# Patient Record
Sex: MALE | Race: WHITE | NOT HISPANIC OR LATINO | Employment: UNEMPLOYED | URBAN - METROPOLITAN AREA
[De-identification: names, ages, dates, MRNs, and addresses within clinical notes are randomized per-mention and may not be internally consistent; named-entity substitution may affect disease eponyms.]

---

## 2017-03-13 ENCOUNTER — COMMUNICATION - HEALTHEAST (OUTPATIENT)
Dept: FAMILY MEDICINE | Facility: CLINIC | Age: 47
End: 2017-03-13

## 2017-03-13 DIAGNOSIS — Z00.00 HEALTH CARE MAINTENANCE: ICD-10-CM

## 2017-03-13 DIAGNOSIS — I25.10 CORONARY ATHEROSCLEROSIS: ICD-10-CM

## 2017-03-14 ENCOUNTER — COMMUNICATION - HEALTHEAST (OUTPATIENT)
Dept: FAMILY MEDICINE | Facility: CLINIC | Age: 47
End: 2017-03-14

## 2017-03-14 DIAGNOSIS — Z00.00 HEALTH CARE MAINTENANCE: ICD-10-CM

## 2017-04-13 ENCOUNTER — COMMUNICATION - HEALTHEAST (OUTPATIENT)
Dept: FAMILY MEDICINE | Facility: CLINIC | Age: 47
End: 2017-04-13

## 2017-06-12 ENCOUNTER — RECORDS - HEALTHEAST (OUTPATIENT)
Dept: ADMINISTRATIVE | Facility: OTHER | Age: 47
End: 2017-06-12

## 2017-06-15 ENCOUNTER — RECORDS - HEALTHEAST (OUTPATIENT)
Dept: ADMINISTRATIVE | Facility: OTHER | Age: 47
End: 2017-06-15

## 2017-07-24 ENCOUNTER — RECORDS - HEALTHEAST (OUTPATIENT)
Dept: ADMINISTRATIVE | Facility: OTHER | Age: 47
End: 2017-07-24

## 2017-07-27 ENCOUNTER — COMMUNICATION - HEALTHEAST (OUTPATIENT)
Dept: FAMILY MEDICINE | Facility: CLINIC | Age: 47
End: 2017-07-27

## 2017-07-31 ENCOUNTER — OFFICE VISIT - HEALTHEAST (OUTPATIENT)
Dept: FAMILY MEDICINE | Facility: CLINIC | Age: 47
End: 2017-07-31

## 2017-07-31 ENCOUNTER — COMMUNICATION - HEALTHEAST (OUTPATIENT)
Dept: FAMILY MEDICINE | Facility: CLINIC | Age: 47
End: 2017-07-31

## 2017-07-31 DIAGNOSIS — I25.10 CORONARY ATHEROSCLEROSIS: ICD-10-CM

## 2017-07-31 DIAGNOSIS — Z01.818 PRE-OP EXAM: ICD-10-CM

## 2017-07-31 DIAGNOSIS — F15.11 NONDEPENDENT AMPHETAMINE OR RELATED ACTING SYMPATHOMIMETIC ABUSE, IN REMISSION (H): ICD-10-CM

## 2017-07-31 DIAGNOSIS — J45.20 ASTHMA, MILD INTERMITTENT: ICD-10-CM

## 2017-07-31 LAB
ATRIAL RATE - MUSE: 85 BPM
DIASTOLIC BLOOD PRESSURE - MUSE: NORMAL MMHG
INTERPRETATION ECG - MUSE: NORMAL
P AXIS - MUSE: 62 DEGREES
PR INTERVAL - MUSE: 158 MS
QRS DURATION - MUSE: 88 MS
QT - MUSE: 366 MS
QTC - MUSE: 435 MS
R AXIS - MUSE: 54 DEGREES
SYSTOLIC BLOOD PRESSURE - MUSE: NORMAL MMHG
T AXIS - MUSE: 55 DEGREES
VENTRICULAR RATE- MUSE: 85 BPM

## 2017-07-31 ASSESSMENT — MIFFLIN-ST. JEOR: SCORE: 1975.51

## 2017-08-01 ENCOUNTER — COMMUNICATION - HEALTHEAST (OUTPATIENT)
Dept: FAMILY MEDICINE | Facility: CLINIC | Age: 47
End: 2017-08-01

## 2017-08-11 ENCOUNTER — OFFICE VISIT - HEALTHEAST (OUTPATIENT)
Dept: ADDICTION MEDICINE | Facility: CLINIC | Age: 47
End: 2017-08-11

## 2017-08-11 DIAGNOSIS — F15.20 METHAMPHETAMINE USE DISORDER, MODERATE (H): ICD-10-CM

## 2017-08-14 ENCOUNTER — OFFICE VISIT - HEALTHEAST (OUTPATIENT)
Dept: ADDICTION MEDICINE | Facility: CLINIC | Age: 47
End: 2017-08-14

## 2017-08-14 DIAGNOSIS — F15.20 METHAMPHETAMINE USE DISORDER, MODERATE (H): ICD-10-CM

## 2017-08-18 ENCOUNTER — RECORDS - HEALTHEAST (OUTPATIENT)
Dept: ADMINISTRATIVE | Facility: OTHER | Age: 47
End: 2017-08-18

## 2017-08-18 ENCOUNTER — AMBULATORY - HEALTHEAST (OUTPATIENT)
Dept: ADDICTION MEDICINE | Facility: CLINIC | Age: 47
End: 2017-08-18

## 2017-08-21 ENCOUNTER — RECORDS - HEALTHEAST (OUTPATIENT)
Dept: ADMINISTRATIVE | Facility: OTHER | Age: 47
End: 2017-08-21

## 2017-08-23 ENCOUNTER — COMMUNICATION - HEALTHEAST (OUTPATIENT)
Dept: ADDICTION MEDICINE | Facility: CLINIC | Age: 47
End: 2017-08-23

## 2017-08-25 ENCOUNTER — AMBULATORY - HEALTHEAST (OUTPATIENT)
Dept: ADDICTION MEDICINE | Facility: CLINIC | Age: 47
End: 2017-08-25

## 2017-08-28 ENCOUNTER — AMBULATORY - HEALTHEAST (OUTPATIENT)
Dept: ADDICTION MEDICINE | Facility: CLINIC | Age: 47
End: 2017-08-28

## 2017-08-28 ENCOUNTER — OFFICE VISIT - HEALTHEAST (OUTPATIENT)
Dept: ADDICTION MEDICINE | Facility: CLINIC | Age: 47
End: 2017-08-28

## 2017-08-28 DIAGNOSIS — F15.20 METHAMPHETAMINE USE DISORDER, MODERATE (H): ICD-10-CM

## 2017-08-29 ENCOUNTER — OFFICE VISIT - HEALTHEAST (OUTPATIENT)
Dept: ADDICTION MEDICINE | Facility: CLINIC | Age: 47
End: 2017-08-29

## 2017-08-29 DIAGNOSIS — F15.20 METHAMPHETAMINE USE DISORDER, MODERATE (H): ICD-10-CM

## 2017-08-30 ENCOUNTER — RECORDS - HEALTHEAST (OUTPATIENT)
Dept: ADMINISTRATIVE | Facility: OTHER | Age: 47
End: 2017-08-30

## 2017-09-01 ENCOUNTER — AMBULATORY - HEALTHEAST (OUTPATIENT)
Dept: ADDICTION MEDICINE | Facility: CLINIC | Age: 47
End: 2017-09-01

## 2017-09-05 ENCOUNTER — OFFICE VISIT - HEALTHEAST (OUTPATIENT)
Dept: ADDICTION MEDICINE | Facility: CLINIC | Age: 47
End: 2017-09-05

## 2017-09-05 DIAGNOSIS — F15.20 METHAMPHETAMINE USE DISORDER, MODERATE (H): ICD-10-CM

## 2017-09-08 ENCOUNTER — AMBULATORY - HEALTHEAST (OUTPATIENT)
Dept: ADDICTION MEDICINE | Facility: CLINIC | Age: 47
End: 2017-09-08

## 2017-09-08 ENCOUNTER — OFFICE VISIT - HEALTHEAST (OUTPATIENT)
Dept: ADDICTION MEDICINE | Facility: CLINIC | Age: 47
End: 2017-09-08

## 2017-09-08 DIAGNOSIS — F15.20 METHAMPHETAMINE USE DISORDER, MODERATE (H): ICD-10-CM

## 2017-09-13 ENCOUNTER — OFFICE VISIT - HEALTHEAST (OUTPATIENT)
Dept: ADDICTION MEDICINE | Facility: CLINIC | Age: 47
End: 2017-09-13

## 2017-09-13 ENCOUNTER — AMBULATORY - HEALTHEAST (OUTPATIENT)
Dept: LAB | Facility: CLINIC | Age: 47
End: 2017-09-13

## 2017-09-13 DIAGNOSIS — F15.20 METHAMPHETAMINE USE DISORDER, MODERATE (H): ICD-10-CM

## 2017-09-15 ENCOUNTER — AMBULATORY - HEALTHEAST (OUTPATIENT)
Dept: ADDICTION MEDICINE | Facility: CLINIC | Age: 47
End: 2017-09-15

## 2017-09-15 ENCOUNTER — OFFICE VISIT - HEALTHEAST (OUTPATIENT)
Dept: ADDICTION MEDICINE | Facility: CLINIC | Age: 47
End: 2017-09-15

## 2017-09-15 ENCOUNTER — AMBULATORY - HEALTHEAST (OUTPATIENT)
Dept: LAB | Facility: CLINIC | Age: 47
End: 2017-09-15

## 2017-09-15 DIAGNOSIS — F15.20 METHAMPHETAMINE USE DISORDER, MODERATE (H): ICD-10-CM

## 2017-09-18 ENCOUNTER — OFFICE VISIT - HEALTHEAST (OUTPATIENT)
Dept: ADDICTION MEDICINE | Facility: CLINIC | Age: 47
End: 2017-09-18

## 2017-09-18 DIAGNOSIS — F15.20 METHAMPHETAMINE USE DISORDER, MODERATE (H): ICD-10-CM

## 2017-09-19 ENCOUNTER — OFFICE VISIT - HEALTHEAST (OUTPATIENT)
Dept: ADDICTION MEDICINE | Facility: CLINIC | Age: 47
End: 2017-09-19

## 2017-09-19 ENCOUNTER — COMMUNICATION - HEALTHEAST (OUTPATIENT)
Dept: ADDICTION MEDICINE | Facility: CLINIC | Age: 47
End: 2017-09-19

## 2017-09-19 DIAGNOSIS — F15.20 METHAMPHETAMINE USE DISORDER, MODERATE (H): ICD-10-CM

## 2017-09-20 ENCOUNTER — OFFICE VISIT - HEALTHEAST (OUTPATIENT)
Dept: ADDICTION MEDICINE | Facility: CLINIC | Age: 47
End: 2017-09-20

## 2017-09-20 DIAGNOSIS — F15.20 METHAMPHETAMINE USE DISORDER, MODERATE (H): ICD-10-CM

## 2017-09-21 ENCOUNTER — OFFICE VISIT - HEALTHEAST (OUTPATIENT)
Dept: ADDICTION MEDICINE | Facility: CLINIC | Age: 47
End: 2017-09-21

## 2017-09-21 DIAGNOSIS — F15.20 METHAMPHETAMINE USE DISORDER, MODERATE (H): ICD-10-CM

## 2017-09-22 ENCOUNTER — OFFICE VISIT - HEALTHEAST (OUTPATIENT)
Dept: ADDICTION MEDICINE | Facility: CLINIC | Age: 47
End: 2017-09-22

## 2017-09-22 ENCOUNTER — AMBULATORY - HEALTHEAST (OUTPATIENT)
Dept: ADDICTION MEDICINE | Facility: CLINIC | Age: 47
End: 2017-09-22

## 2017-09-22 DIAGNOSIS — F15.20 METHAMPHETAMINE USE DISORDER, MODERATE (H): ICD-10-CM

## 2017-09-25 ENCOUNTER — AMBULATORY - HEALTHEAST (OUTPATIENT)
Dept: ADDICTION MEDICINE | Facility: CLINIC | Age: 47
End: 2017-09-25

## 2017-09-26 ENCOUNTER — OFFICE VISIT - HEALTHEAST (OUTPATIENT)
Dept: ADDICTION MEDICINE | Facility: CLINIC | Age: 47
End: 2017-09-26

## 2017-09-26 DIAGNOSIS — F15.20 METHAMPHETAMINE USE DISORDER, MODERATE (H): ICD-10-CM

## 2017-09-27 ENCOUNTER — OFFICE VISIT - HEALTHEAST (OUTPATIENT)
Dept: ADDICTION MEDICINE | Facility: CLINIC | Age: 47
End: 2017-09-27

## 2017-09-27 DIAGNOSIS — F15.20 METHAMPHETAMINE USE DISORDER, MODERATE (H): ICD-10-CM

## 2017-09-29 ENCOUNTER — OFFICE VISIT - HEALTHEAST (OUTPATIENT)
Dept: ADDICTION MEDICINE | Facility: CLINIC | Age: 47
End: 2017-09-29

## 2017-09-29 ENCOUNTER — AMBULATORY - HEALTHEAST (OUTPATIENT)
Dept: ADDICTION MEDICINE | Facility: CLINIC | Age: 47
End: 2017-09-29

## 2017-09-29 DIAGNOSIS — F15.20 METHAMPHETAMINE USE DISORDER, MODERATE (H): ICD-10-CM

## 2017-10-06 ENCOUNTER — OFFICE VISIT - HEALTHEAST (OUTPATIENT)
Dept: ADDICTION MEDICINE | Facility: CLINIC | Age: 47
End: 2017-10-06

## 2017-10-06 ENCOUNTER — AMBULATORY - HEALTHEAST (OUTPATIENT)
Dept: ADDICTION MEDICINE | Facility: CLINIC | Age: 47
End: 2017-10-06

## 2017-10-06 DIAGNOSIS — F15.20 METHAMPHETAMINE USE DISORDER, MODERATE (H): ICD-10-CM

## 2017-10-10 ENCOUNTER — OFFICE VISIT - HEALTHEAST (OUTPATIENT)
Dept: ADDICTION MEDICINE | Facility: CLINIC | Age: 47
End: 2017-10-10

## 2017-10-10 DIAGNOSIS — F15.20 METHAMPHETAMINE USE DISORDER, MODERATE (H): ICD-10-CM

## 2017-10-13 ENCOUNTER — AMBULATORY - HEALTHEAST (OUTPATIENT)
Dept: ADDICTION MEDICINE | Facility: CLINIC | Age: 47
End: 2017-10-13

## 2017-10-16 ENCOUNTER — AMBULATORY - HEALTHEAST (OUTPATIENT)
Dept: LAB | Facility: CLINIC | Age: 47
End: 2017-10-16

## 2017-10-16 ENCOUNTER — OFFICE VISIT - HEALTHEAST (OUTPATIENT)
Dept: ADDICTION MEDICINE | Facility: CLINIC | Age: 47
End: 2017-10-16

## 2017-10-16 DIAGNOSIS — F15.20 METHAMPHETAMINE USE DISORDER, MODERATE (H): ICD-10-CM

## 2017-10-17 ENCOUNTER — COMMUNICATION - HEALTHEAST (OUTPATIENT)
Dept: ADDICTION MEDICINE | Facility: CLINIC | Age: 47
End: 2017-10-17

## 2017-10-18 ENCOUNTER — OFFICE VISIT - HEALTHEAST (OUTPATIENT)
Dept: ADDICTION MEDICINE | Facility: CLINIC | Age: 47
End: 2017-10-18

## 2017-10-18 DIAGNOSIS — F15.20 METHAMPHETAMINE USE DISORDER, MODERATE (H): ICD-10-CM

## 2017-10-20 ENCOUNTER — OFFICE VISIT - HEALTHEAST (OUTPATIENT)
Dept: ADDICTION MEDICINE | Facility: CLINIC | Age: 47
End: 2017-10-20

## 2017-10-20 ENCOUNTER — AMBULATORY - HEALTHEAST (OUTPATIENT)
Dept: ADDICTION MEDICINE | Facility: CLINIC | Age: 47
End: 2017-10-20

## 2017-10-20 DIAGNOSIS — F15.20 METHAMPHETAMINE USE DISORDER, MODERATE (H): ICD-10-CM

## 2017-10-23 ENCOUNTER — OFFICE VISIT - HEALTHEAST (OUTPATIENT)
Dept: ADDICTION MEDICINE | Facility: CLINIC | Age: 47
End: 2017-10-23

## 2017-10-23 DIAGNOSIS — F15.20 METHAMPHETAMINE USE DISORDER, MODERATE (H): ICD-10-CM

## 2017-10-24 ENCOUNTER — OFFICE VISIT - HEALTHEAST (OUTPATIENT)
Dept: ADDICTION MEDICINE | Facility: CLINIC | Age: 47
End: 2017-10-24

## 2017-10-24 DIAGNOSIS — F15.20 METHAMPHETAMINE USE DISORDER, MODERATE (H): ICD-10-CM

## 2017-10-27 ENCOUNTER — AMBULATORY - HEALTHEAST (OUTPATIENT)
Dept: LAB | Facility: CLINIC | Age: 47
End: 2017-10-27

## 2017-10-27 ENCOUNTER — OFFICE VISIT - HEALTHEAST (OUTPATIENT)
Dept: ADDICTION MEDICINE | Facility: CLINIC | Age: 47
End: 2017-10-27

## 2017-10-27 DIAGNOSIS — F15.20 METHAMPHETAMINE USE DISORDER, MODERATE (H): ICD-10-CM

## 2017-10-30 ENCOUNTER — OFFICE VISIT - HEALTHEAST (OUTPATIENT)
Dept: ADDICTION MEDICINE | Facility: CLINIC | Age: 47
End: 2017-10-30

## 2017-10-30 ENCOUNTER — AMBULATORY - HEALTHEAST (OUTPATIENT)
Dept: ADDICTION MEDICINE | Facility: CLINIC | Age: 47
End: 2017-10-30

## 2017-10-30 DIAGNOSIS — F15.20 METHAMPHETAMINE USE DISORDER, MODERATE (H): ICD-10-CM

## 2017-11-01 ENCOUNTER — OFFICE VISIT - HEALTHEAST (OUTPATIENT)
Dept: ADDICTION MEDICINE | Facility: CLINIC | Age: 47
End: 2017-11-01

## 2017-11-01 DIAGNOSIS — F15.20 METHAMPHETAMINE USE DISORDER, MODERATE (H): ICD-10-CM

## 2017-11-03 ENCOUNTER — AMBULATORY - HEALTHEAST (OUTPATIENT)
Dept: ADDICTION MEDICINE | Facility: CLINIC | Age: 47
End: 2017-11-03

## 2017-11-06 ENCOUNTER — OFFICE VISIT - HEALTHEAST (OUTPATIENT)
Dept: ADDICTION MEDICINE | Facility: CLINIC | Age: 47
End: 2017-11-06

## 2017-11-06 DIAGNOSIS — F15.20 METHAMPHETAMINE USE DISORDER, MODERATE (H): ICD-10-CM

## 2017-11-07 ENCOUNTER — OFFICE VISIT - HEALTHEAST (OUTPATIENT)
Dept: ADDICTION MEDICINE | Facility: CLINIC | Age: 47
End: 2017-11-07

## 2017-11-07 DIAGNOSIS — F15.20 METHAMPHETAMINE USE DISORDER, MODERATE (H): ICD-10-CM

## 2017-11-10 ENCOUNTER — AMBULATORY - HEALTHEAST (OUTPATIENT)
Dept: ADDICTION MEDICINE | Facility: CLINIC | Age: 47
End: 2017-11-10

## 2017-11-13 ENCOUNTER — OFFICE VISIT - HEALTHEAST (OUTPATIENT)
Dept: ADDICTION MEDICINE | Facility: CLINIC | Age: 47
End: 2017-11-13

## 2017-11-13 DIAGNOSIS — F15.20 METHAMPHETAMINE USE DISORDER, MODERATE (H): ICD-10-CM

## 2017-11-15 ENCOUNTER — OFFICE VISIT - HEALTHEAST (OUTPATIENT)
Dept: ADDICTION MEDICINE | Facility: CLINIC | Age: 47
End: 2017-11-15

## 2017-11-15 DIAGNOSIS — F15.20 METHAMPHETAMINE USE DISORDER, MODERATE (H): ICD-10-CM

## 2017-11-17 ENCOUNTER — AMBULATORY - HEALTHEAST (OUTPATIENT)
Dept: ADDICTION MEDICINE | Facility: CLINIC | Age: 47
End: 2017-11-17

## 2017-11-20 ENCOUNTER — OFFICE VISIT - HEALTHEAST (OUTPATIENT)
Dept: ADDICTION MEDICINE | Facility: CLINIC | Age: 47
End: 2017-11-20

## 2017-11-20 DIAGNOSIS — F15.20 METHAMPHETAMINE USE DISORDER, MODERATE (H): ICD-10-CM

## 2017-11-22 ENCOUNTER — AMBULATORY - HEALTHEAST (OUTPATIENT)
Dept: ADDICTION MEDICINE | Facility: CLINIC | Age: 47
End: 2017-11-22

## 2017-11-27 ENCOUNTER — OFFICE VISIT - HEALTHEAST (OUTPATIENT)
Dept: ADDICTION MEDICINE | Facility: CLINIC | Age: 47
End: 2017-11-27

## 2017-11-27 DIAGNOSIS — F15.20 METHAMPHETAMINE USE DISORDER, MODERATE (H): ICD-10-CM

## 2017-12-01 ENCOUNTER — AMBULATORY - HEALTHEAST (OUTPATIENT)
Dept: ADDICTION MEDICINE | Facility: CLINIC | Age: 47
End: 2017-12-01

## 2017-12-01 ENCOUNTER — OFFICE VISIT - HEALTHEAST (OUTPATIENT)
Dept: ADDICTION MEDICINE | Facility: CLINIC | Age: 47
End: 2017-12-01

## 2017-12-01 DIAGNOSIS — F15.20 METHAMPHETAMINE USE DISORDER, MODERATE (H): ICD-10-CM

## 2017-12-02 ENCOUNTER — AMBULATORY - HEALTHEAST (OUTPATIENT)
Dept: ADDICTION MEDICINE | Facility: CLINIC | Age: 47
End: 2017-12-02

## 2017-12-05 ENCOUNTER — AMBULATORY - HEALTHEAST (OUTPATIENT)
Dept: LAB | Facility: CLINIC | Age: 47
End: 2017-12-05

## 2017-12-05 ENCOUNTER — OFFICE VISIT - HEALTHEAST (OUTPATIENT)
Dept: ADDICTION MEDICINE | Facility: CLINIC | Age: 47
End: 2017-12-05

## 2017-12-05 DIAGNOSIS — F15.20 METHAMPHETAMINE USE DISORDER, MODERATE (H): ICD-10-CM

## 2017-12-06 ENCOUNTER — OFFICE VISIT - HEALTHEAST (OUTPATIENT)
Dept: ADDICTION MEDICINE | Facility: CLINIC | Age: 47
End: 2017-12-06

## 2017-12-06 DIAGNOSIS — F15.20 METHAMPHETAMINE USE DISORDER, MODERATE (H): ICD-10-CM

## 2017-12-08 ENCOUNTER — AMBULATORY - HEALTHEAST (OUTPATIENT)
Dept: ADDICTION MEDICINE | Facility: CLINIC | Age: 47
End: 2017-12-08

## 2017-12-08 ENCOUNTER — OFFICE VISIT - HEALTHEAST (OUTPATIENT)
Dept: ADDICTION MEDICINE | Facility: CLINIC | Age: 47
End: 2017-12-08

## 2017-12-08 DIAGNOSIS — F15.20 METHAMPHETAMINE USE DISORDER, MODERATE (H): ICD-10-CM

## 2017-12-18 ENCOUNTER — OFFICE VISIT - HEALTHEAST (OUTPATIENT)
Dept: BEHAVIORAL HEALTH | Facility: CLINIC | Age: 47
End: 2017-12-18

## 2017-12-18 DIAGNOSIS — F33.1 DEPRESSION, MAJOR, RECURRENT, MODERATE (H): ICD-10-CM

## 2017-12-18 DIAGNOSIS — F43.9 TRAUMA AND STRESSOR-RELATED DISORDER: ICD-10-CM

## 2017-12-18 DIAGNOSIS — F41.1 GAD (GENERALIZED ANXIETY DISORDER): ICD-10-CM

## 2017-12-18 DIAGNOSIS — F15.11 METHAMPHETAMINE ABUSE IN REMISSION (H): ICD-10-CM

## 2017-12-28 ENCOUNTER — AMBULATORY - HEALTHEAST (OUTPATIENT)
Dept: ADDICTION MEDICINE | Facility: CLINIC | Age: 47
End: 2017-12-28

## 2018-01-02 ENCOUNTER — AMBULATORY - HEALTHEAST (OUTPATIENT)
Dept: ADDICTION MEDICINE | Facility: CLINIC | Age: 48
End: 2018-01-02

## 2018-03-26 ENCOUNTER — RECORDS - HEALTHEAST (OUTPATIENT)
Dept: ADMINISTRATIVE | Facility: OTHER | Age: 48
End: 2018-03-26

## 2018-09-13 ENCOUNTER — COMMUNICATION - HEALTHEAST (OUTPATIENT)
Dept: FAMILY MEDICINE | Facility: CLINIC | Age: 48
End: 2018-09-13

## 2018-10-30 ENCOUNTER — OFFICE VISIT - HEALTHEAST (OUTPATIENT)
Dept: FAMILY MEDICINE | Facility: CLINIC | Age: 48
End: 2018-10-30

## 2018-10-30 DIAGNOSIS — Z00.00 HEALTH CARE MAINTENANCE: ICD-10-CM

## 2018-10-30 DIAGNOSIS — F30.9 BIPOLAR I DISORDER, SINGLE MANIC EPISODE (H): ICD-10-CM

## 2018-10-30 DIAGNOSIS — E78.00 PURE HYPERCHOLESTEROLEMIA: ICD-10-CM

## 2018-10-30 DIAGNOSIS — M54.50 LOWER BACK PAIN: ICD-10-CM

## 2018-10-30 DIAGNOSIS — M79.622 PAIN OF LEFT UPPER ARM: ICD-10-CM

## 2018-10-30 DIAGNOSIS — I25.10 CORONARY ATHEROSCLEROSIS: ICD-10-CM

## 2018-10-30 DIAGNOSIS — G47.00 INSOMNIA: ICD-10-CM

## 2018-10-30 DIAGNOSIS — K22.70 BARRETT'S ESOPHAGUS: ICD-10-CM

## 2018-10-30 DIAGNOSIS — E55.9 VITAMIN D DEFICIENCY: ICD-10-CM

## 2018-10-30 DIAGNOSIS — J45.20 ASTHMA, MILD INTERMITTENT: ICD-10-CM

## 2018-10-30 LAB
ALBUMIN SERPL-MCNC: 4.2 G/DL (ref 3.5–5)
ALP SERPL-CCNC: 76 U/L (ref 45–120)
ALT SERPL W P-5'-P-CCNC: 23 U/L (ref 0–45)
ANION GAP SERPL CALCULATED.3IONS-SCNC: 13 MMOL/L (ref 5–18)
AST SERPL W P-5'-P-CCNC: 20 U/L (ref 0–40)
BASOPHILS # BLD AUTO: 0.1 THOU/UL (ref 0–0.2)
BASOPHILS NFR BLD AUTO: 1 % (ref 0–2)
BILIRUB SERPL-MCNC: 0.5 MG/DL (ref 0–1)
BUN SERPL-MCNC: 16 MG/DL (ref 8–22)
CALCIUM SERPL-MCNC: 10.1 MG/DL (ref 8.5–10.5)
CHLORIDE BLD-SCNC: 103 MMOL/L (ref 98–107)
CHOLEST SERPL-MCNC: 203 MG/DL
CO2 SERPL-SCNC: 24 MMOL/L (ref 22–31)
CREAT SERPL-MCNC: 1.02 MG/DL (ref 0.7–1.3)
EOSINOPHIL # BLD AUTO: 0.5 THOU/UL (ref 0–0.4)
EOSINOPHIL NFR BLD AUTO: 4 % (ref 0–6)
ERYTHROCYTE [DISTWIDTH] IN BLOOD BY AUTOMATED COUNT: 11.9 % (ref 11–14.5)
FASTING STATUS PATIENT QL REPORTED: YES
GFR SERPL CREATININE-BSD FRML MDRD: >60 ML/MIN/1.73M2
GLUCOSE BLD-MCNC: 96 MG/DL (ref 70–125)
HCT VFR BLD AUTO: 46.4 % (ref 40–54)
HDLC SERPL-MCNC: 72 MG/DL
HGB BLD-MCNC: 15.6 G/DL (ref 14–18)
LDLC SERPL CALC-MCNC: 104 MG/DL
LYMPHOCYTES # BLD AUTO: 3.4 THOU/UL (ref 0.8–4.4)
LYMPHOCYTES NFR BLD AUTO: 30 % (ref 20–40)
MCH RBC QN AUTO: 31.4 PG (ref 27–34)
MCHC RBC AUTO-ENTMCNC: 33.7 G/DL (ref 32–36)
MCV RBC AUTO: 93 FL (ref 80–100)
MONOCYTES # BLD AUTO: 0.6 THOU/UL (ref 0–0.9)
MONOCYTES NFR BLD AUTO: 5 % (ref 2–10)
NEUTROPHILS # BLD AUTO: 6.8 THOU/UL (ref 2–7.7)
NEUTROPHILS NFR BLD AUTO: 60 % (ref 50–70)
PLATELET # BLD AUTO: 442 THOU/UL (ref 140–440)
PMV BLD AUTO: 6.8 FL (ref 7–10)
POTASSIUM BLD-SCNC: 4.8 MMOL/L (ref 3.5–5)
PROT SERPL-MCNC: 7.6 G/DL (ref 6–8)
RBC # BLD AUTO: 4.97 MILL/UL (ref 4.4–6.2)
SODIUM SERPL-SCNC: 140 MMOL/L (ref 136–145)
TRIGL SERPL-MCNC: 137 MG/DL
WBC: 11.4 THOU/UL (ref 4–11)

## 2018-10-31 ENCOUNTER — COMMUNICATION - HEALTHEAST (OUTPATIENT)
Dept: FAMILY MEDICINE | Facility: CLINIC | Age: 48
End: 2018-10-31

## 2018-10-31 ENCOUNTER — AMBULATORY - HEALTHEAST (OUTPATIENT)
Dept: FAMILY MEDICINE | Facility: CLINIC | Age: 48
End: 2018-10-31

## 2018-10-31 DIAGNOSIS — E78.5 HYPERLIPIDEMIA: ICD-10-CM

## 2018-10-31 DIAGNOSIS — I25.10 CORONARY ATHEROSCLEROSIS: ICD-10-CM

## 2018-10-31 LAB — 25(OH)D3 SERPL-MCNC: 12.5 NG/ML (ref 30–80)

## 2018-11-02 ENCOUNTER — COMMUNICATION - HEALTHEAST (OUTPATIENT)
Dept: FAMILY MEDICINE | Facility: CLINIC | Age: 48
End: 2018-11-02

## 2018-11-05 ENCOUNTER — COMMUNICATION - HEALTHEAST (OUTPATIENT)
Dept: FAMILY MEDICINE | Facility: CLINIC | Age: 48
End: 2018-11-05

## 2018-11-06 ENCOUNTER — RECORDS - HEALTHEAST (OUTPATIENT)
Dept: ADMINISTRATIVE | Facility: OTHER | Age: 48
End: 2018-11-06

## 2018-11-09 ENCOUNTER — COMMUNICATION - HEALTHEAST (OUTPATIENT)
Dept: FAMILY MEDICINE | Facility: CLINIC | Age: 48
End: 2018-11-09

## 2018-11-09 DIAGNOSIS — F30.9 BIPOLAR I DISORDER, SINGLE MANIC EPISODE (H): ICD-10-CM

## 2018-11-09 DIAGNOSIS — F43.10 PTSD (POST-TRAUMATIC STRESS DISORDER): ICD-10-CM

## 2018-11-09 DIAGNOSIS — H54.3 DECREASED VISION IN BOTH EYES: ICD-10-CM

## 2018-11-09 DIAGNOSIS — H91.93 DECREASED HEARING OF BOTH EARS: ICD-10-CM

## 2018-11-21 ENCOUNTER — COMMUNICATION - HEALTHEAST (OUTPATIENT)
Dept: FAMILY MEDICINE | Facility: CLINIC | Age: 48
End: 2018-11-21

## 2018-12-11 ENCOUNTER — COMMUNICATION - HEALTHEAST (OUTPATIENT)
Dept: FAMILY MEDICINE | Facility: CLINIC | Age: 48
End: 2018-12-11

## 2019-03-15 ENCOUNTER — COMMUNICATION - HEALTHEAST (OUTPATIENT)
Dept: FAMILY MEDICINE | Facility: CLINIC | Age: 49
End: 2019-03-15

## 2019-04-15 ENCOUNTER — COMMUNICATION - HEALTHEAST (OUTPATIENT)
Dept: FAMILY MEDICINE | Facility: CLINIC | Age: 49
End: 2019-04-15

## 2019-04-15 DIAGNOSIS — K22.70 BARRETT'S ESOPHAGUS: ICD-10-CM

## 2019-04-15 DIAGNOSIS — E78.00 PURE HYPERCHOLESTEROLEMIA: ICD-10-CM

## 2019-04-15 DIAGNOSIS — I25.10 CORONARY ATHEROSCLEROSIS: ICD-10-CM

## 2019-04-19 ENCOUNTER — COMMUNICATION - HEALTHEAST (OUTPATIENT)
Dept: FAMILY MEDICINE | Facility: CLINIC | Age: 49
End: 2019-04-19

## 2019-06-11 ENCOUNTER — COMMUNICATION - HEALTHEAST (OUTPATIENT)
Dept: FAMILY MEDICINE | Facility: CLINIC | Age: 49
End: 2019-06-11

## 2019-06-12 ENCOUNTER — OFFICE VISIT - HEALTHEAST (OUTPATIENT)
Dept: FAMILY MEDICINE | Facility: CLINIC | Age: 49
End: 2019-06-12

## 2019-06-12 ENCOUNTER — COMMUNICATION - HEALTHEAST (OUTPATIENT)
Dept: FAMILY MEDICINE | Facility: CLINIC | Age: 49
End: 2019-06-12

## 2019-06-12 DIAGNOSIS — G89.29 CHRONIC LOW BACK PAIN WITH BILATERAL SCIATICA, UNSPECIFIED BACK PAIN LATERALITY: ICD-10-CM

## 2019-06-12 DIAGNOSIS — M54.41 CHRONIC LOW BACK PAIN WITH BILATERAL SCIATICA, UNSPECIFIED BACK PAIN LATERALITY: ICD-10-CM

## 2019-06-12 DIAGNOSIS — F17.200 TOBACCO USE DISORDER: ICD-10-CM

## 2019-06-12 DIAGNOSIS — I25.10 CORONARY ATHEROSCLEROSIS: ICD-10-CM

## 2019-06-12 DIAGNOSIS — E78.00 PURE HYPERCHOLESTEROLEMIA: ICD-10-CM

## 2019-06-12 DIAGNOSIS — F30.9 BIPOLAR I DISORDER, SINGLE MANIC EPISODE (H): ICD-10-CM

## 2019-06-12 DIAGNOSIS — M25.512 ACUTE PAIN OF LEFT SHOULDER: ICD-10-CM

## 2019-06-12 DIAGNOSIS — J45.20 ASTHMA, MILD INTERMITTENT: ICD-10-CM

## 2019-06-12 DIAGNOSIS — M54.50 LOWER BACK PAIN: ICD-10-CM

## 2019-06-12 DIAGNOSIS — K22.70 BARRETT'S ESOPHAGUS: ICD-10-CM

## 2019-06-12 DIAGNOSIS — M54.42 CHRONIC LOW BACK PAIN WITH BILATERAL SCIATICA, UNSPECIFIED BACK PAIN LATERALITY: ICD-10-CM

## 2019-06-12 DIAGNOSIS — F43.10 PTSD (POST-TRAUMATIC STRESS DISORDER): ICD-10-CM

## 2019-06-12 DIAGNOSIS — Z00.00 HEALTH CARE MAINTENANCE: ICD-10-CM

## 2019-06-12 DIAGNOSIS — G47.00 INSOMNIA: ICD-10-CM

## 2019-06-12 ASSESSMENT — MIFFLIN-ST. JEOR: SCORE: 2017.92

## 2019-06-13 ENCOUNTER — COMMUNICATION - HEALTHEAST (OUTPATIENT)
Dept: FAMILY MEDICINE | Facility: CLINIC | Age: 49
End: 2019-06-13

## 2019-06-13 DIAGNOSIS — G47.00 INSOMNIA: ICD-10-CM

## 2019-06-13 DIAGNOSIS — M54.50 LOWER BACK PAIN: ICD-10-CM

## 2019-06-13 DIAGNOSIS — K22.70 BARRETT'S ESOPHAGUS: ICD-10-CM

## 2019-06-13 DIAGNOSIS — E78.00 PURE HYPERCHOLESTEROLEMIA: ICD-10-CM

## 2019-06-13 DIAGNOSIS — J45.20 ASTHMA, MILD INTERMITTENT: ICD-10-CM

## 2019-06-13 DIAGNOSIS — I25.10 CORONARY ATHEROSCLEROSIS: ICD-10-CM

## 2019-06-13 DIAGNOSIS — E55.9 VITAMIN D DEFICIENCY: ICD-10-CM

## 2019-06-13 DIAGNOSIS — Z00.00 HEALTH CARE MAINTENANCE: ICD-10-CM

## 2019-06-14 ENCOUNTER — RECORDS - HEALTHEAST (OUTPATIENT)
Dept: ADMINISTRATIVE | Facility: OTHER | Age: 49
End: 2019-06-14

## 2019-06-24 ENCOUNTER — COMMUNICATION - HEALTHEAST (OUTPATIENT)
Dept: FAMILY MEDICINE | Facility: CLINIC | Age: 49
End: 2019-06-24

## 2019-06-24 DIAGNOSIS — I25.10 CORONARY ATHEROSCLEROSIS: ICD-10-CM

## 2019-07-19 ENCOUNTER — RECORDS - HEALTHEAST (OUTPATIENT)
Dept: ADMINISTRATIVE | Facility: OTHER | Age: 49
End: 2019-07-19

## 2019-07-31 ENCOUNTER — COMMUNICATION - HEALTHEAST (OUTPATIENT)
Dept: FAMILY MEDICINE | Facility: CLINIC | Age: 49
End: 2019-07-31

## 2019-11-12 ENCOUNTER — COMMUNICATION - HEALTHEAST (OUTPATIENT)
Dept: FAMILY MEDICINE | Facility: CLINIC | Age: 49
End: 2019-11-12

## 2020-01-23 ENCOUNTER — COMMUNICATION - HEALTHEAST (OUTPATIENT)
Dept: SCHEDULING | Facility: CLINIC | Age: 50
End: 2020-01-23

## 2020-01-29 ENCOUNTER — COMMUNICATION - HEALTHEAST (OUTPATIENT)
Dept: FAMILY MEDICINE | Facility: CLINIC | Age: 50
End: 2020-01-29

## 2020-01-29 ENCOUNTER — RECORDS - HEALTHEAST (OUTPATIENT)
Dept: ADMINISTRATIVE | Facility: OTHER | Age: 50
End: 2020-01-29

## 2020-01-29 ENCOUNTER — AMBULATORY - HEALTHEAST (OUTPATIENT)
Dept: FAMILY MEDICINE | Facility: CLINIC | Age: 50
End: 2020-01-29

## 2020-01-29 ENCOUNTER — APPOINTMENT (OUTPATIENT)
Dept: CT IMAGING | Facility: CLINIC | Age: 50
End: 2020-01-29
Attending: FAMILY MEDICINE
Payer: COMMERCIAL

## 2020-01-29 ENCOUNTER — HOSPITAL ENCOUNTER (EMERGENCY)
Facility: CLINIC | Age: 50
Discharge: HOME | End: 2020-01-29
Attending: FAMILY MEDICINE | Admitting: FAMILY MEDICINE
Payer: COMMERCIAL

## 2020-01-29 VITALS
RESPIRATION RATE: 16 BRPM | DIASTOLIC BLOOD PRESSURE: 89 MMHG | WEIGHT: 232.38 LBS | OXYGEN SATURATION: 99 % | TEMPERATURE: 98.1 F | HEART RATE: 82 BPM | SYSTOLIC BLOOD PRESSURE: 117 MMHG

## 2020-01-29 DIAGNOSIS — J45.20 ASTHMA, MILD INTERMITTENT: ICD-10-CM

## 2020-01-29 DIAGNOSIS — M54.50 LOWER BACK PAIN: ICD-10-CM

## 2020-01-29 DIAGNOSIS — G89.29 CHRONIC BILATERAL LOW BACK PAIN, UNSPECIFIED WHETHER SCIATICA PRESENT: ICD-10-CM

## 2020-01-29 DIAGNOSIS — E55.9 VITAMIN D DEFICIENCY: ICD-10-CM

## 2020-01-29 DIAGNOSIS — E78.00 PURE HYPERCHOLESTEROLEMIA: ICD-10-CM

## 2020-01-29 DIAGNOSIS — I25.10 CORONARY ATHEROSCLEROSIS: ICD-10-CM

## 2020-01-29 DIAGNOSIS — K22.70 BARRETT'S ESOPHAGUS: ICD-10-CM

## 2020-01-29 DIAGNOSIS — R20.2 PARESTHESIAS: ICD-10-CM

## 2020-01-29 DIAGNOSIS — G47.00 INSOMNIA: ICD-10-CM

## 2020-01-29 DIAGNOSIS — M54.50 CHRONIC BILATERAL LOW BACK PAIN, UNSPECIFIED WHETHER SCIATICA PRESENT: ICD-10-CM

## 2020-01-29 LAB
ALBUMIN SERPL-MCNC: 3.1 G/DL (ref 3.4–5)
ALP SERPL-CCNC: 86 U/L (ref 40–150)
ALT SERPL W P-5'-P-CCNC: 85 U/L (ref 0–70)
ANION GAP SERPL CALCULATED.3IONS-SCNC: 3 MMOL/L (ref 3–14)
AST SERPL W P-5'-P-CCNC: 13 U/L (ref 0–45)
BASOPHILS # BLD AUTO: 0 10E9/L (ref 0–0.2)
BASOPHILS NFR BLD AUTO: 0.2 %
BILIRUB SERPL-MCNC: 0.1 MG/DL (ref 0.2–1.3)
BUN SERPL-MCNC: 15 MG/DL (ref 7–30)
CALCIUM SERPL-MCNC: 8.2 MG/DL (ref 8.5–10.1)
CHLORIDE SERPL-SCNC: 108 MMOL/L (ref 94–109)
CO2 SERPL-SCNC: 28 MMOL/L (ref 20–32)
CREAT SERPL-MCNC: 0.76 MG/DL (ref 0.66–1.25)
CRP SERPL-MCNC: 8.4 MG/L (ref 0–8)
DIFFERENTIAL METHOD BLD: NORMAL
EOSINOPHIL # BLD AUTO: 0.5 10E9/L (ref 0–0.7)
EOSINOPHIL NFR BLD AUTO: 5.3 %
ERYTHROCYTE [DISTWIDTH] IN BLOOD BY AUTOMATED COUNT: 12.5 % (ref 10–15)
ERYTHROCYTE [SEDIMENTATION RATE] IN BLOOD BY WESTERGREN METHOD: 17 MM/H (ref 0–20)
GFR SERPL CREATININE-BSD FRML MDRD: >90 ML/MIN/{1.73_M2}
GLUCOSE SERPL-MCNC: 101 MG/DL (ref 70–99)
HCT VFR BLD AUTO: 41.4 % (ref 40–53)
HGB BLD-MCNC: 13.3 G/DL (ref 13.3–17.7)
IMM GRANULOCYTES # BLD: 0 10E9/L (ref 0–0.4)
IMM GRANULOCYTES NFR BLD: 0.2 %
LYMPHOCYTES # BLD AUTO: 3.7 10E9/L (ref 0.8–5.3)
LYMPHOCYTES NFR BLD AUTO: 36.5 %
MCH RBC QN AUTO: 30.1 PG (ref 26.5–33)
MCHC RBC AUTO-ENTMCNC: 32.1 G/DL (ref 31.5–36.5)
MCV RBC AUTO: 94 FL (ref 78–100)
MONOCYTES # BLD AUTO: 0.6 10E9/L (ref 0–1.3)
MONOCYTES NFR BLD AUTO: 5.8 %
NEUTROPHILS # BLD AUTO: 5.2 10E9/L (ref 1.6–8.3)
NEUTROPHILS NFR BLD AUTO: 52 %
NRBC # BLD AUTO: 0 10*3/UL
NRBC BLD AUTO-RTO: 0 /100
PLATELET # BLD AUTO: 398 10E9/L (ref 150–450)
POTASSIUM SERPL-SCNC: 4.4 MMOL/L (ref 3.4–5.3)
PROT SERPL-MCNC: 6.6 G/DL (ref 6.8–8.8)
RBC # BLD AUTO: 4.42 10E12/L (ref 4.4–5.9)
SODIUM SERPL-SCNC: 139 MMOL/L (ref 133–144)
TSH SERPL DL<=0.005 MIU/L-ACNC: 0.49 MU/L (ref 0.4–4)
WBC # BLD AUTO: 10 10E9/L (ref 4–11)

## 2020-01-29 PROCEDURE — 80053 COMPREHEN METABOLIC PANEL: CPT | Performed by: FAMILY MEDICINE

## 2020-01-29 PROCEDURE — 86140 C-REACTIVE PROTEIN: CPT | Performed by: FAMILY MEDICINE

## 2020-01-29 PROCEDURE — 70450 CT HEAD/BRAIN W/O DYE: CPT

## 2020-01-29 PROCEDURE — 85025 COMPLETE CBC W/AUTO DIFF WBC: CPT | Performed by: FAMILY MEDICINE

## 2020-01-29 PROCEDURE — 25000132 ZZH RX MED GY IP 250 OP 250 PS 637: Performed by: FAMILY MEDICINE

## 2020-01-29 PROCEDURE — 85652 RBC SED RATE AUTOMATED: CPT | Performed by: FAMILY MEDICINE

## 2020-01-29 PROCEDURE — 36415 COLL VENOUS BLD VENIPUNCTURE: CPT

## 2020-01-29 PROCEDURE — 84443 ASSAY THYROID STIM HORMONE: CPT | Performed by: FAMILY MEDICINE

## 2020-01-29 PROCEDURE — 99284 EMERGENCY DEPT VISIT MOD MDM: CPT | Mod: 25

## 2020-01-29 PROCEDURE — 99284 EMERGENCY DEPT VISIT MOD MDM: CPT | Mod: Z6 | Performed by: FAMILY MEDICINE

## 2020-01-29 RX ORDER — ASPIRIN 325 MG
325 TABLET ORAL DAILY
COMMUNITY

## 2020-01-29 RX ORDER — ACETAMINOPHEN 160 MG
2000 TABLET,DISINTEGRATING ORAL DAILY
COMMUNITY
Start: 2018-10-30

## 2020-01-29 RX ORDER — LURASIDONE HYDROCHLORIDE 80 MG/1
80 TABLET, FILM COATED ORAL AT BEDTIME
COMMUNITY
Start: 2018-10-30

## 2020-01-29 RX ORDER — NITROGLYCERIN 0.4 MG/1
0.4 TABLET SUBLINGUAL PRN
COMMUNITY
Start: 2016-07-28

## 2020-01-29 RX ORDER — OXYCODONE HYDROCHLORIDE 5 MG/1
10 TABLET ORAL ONCE
Status: COMPLETED | OUTPATIENT
Start: 2020-01-29 | End: 2020-01-29

## 2020-01-29 RX ORDER — ATENOLOL 25 MG/1
25 TABLET ORAL DAILY
COMMUNITY
Start: 2016-07-28

## 2020-01-29 RX ORDER — GABAPENTIN 300 MG/1
300 CAPSULE ORAL 2 TIMES DAILY
COMMUNITY
Start: 2018-10-30 | End: 2020-02-21

## 2020-01-29 RX ORDER — BUDESONIDE AND FORMOTEROL FUMARATE DIHYDRATE 160; 4.5 UG/1; UG/1
1 AEROSOL RESPIRATORY (INHALATION) 2 TIMES DAILY
COMMUNITY
Start: 2016-07-28

## 2020-01-29 RX ADMIN — OXYCODONE HYDROCHLORIDE 10 MG: 5 TABLET ORAL at 11:58

## 2020-01-29 ASSESSMENT — ENCOUNTER SYMPTOMS
WEAKNESS: 0
VOMITING: 0
NUMBNESS: 1
NECK PAIN: 0
FEVER: 0
SPEECH DIFFICULTY: 0
CHILLS: 0
ABDOMINAL PAIN: 0
BACK PAIN: 1
NAUSEA: 0

## 2020-01-29 NOTE — ED AVS SNAPSHOT
[facility], [place], Emergency Department  [address]  [facility] [address]  Phone:  [phone]  Fax:  [phone]                                    Kayden Cortez   MRN: [id number]    Department:  [facility], Emergency Department   Date of Visit:  1/29/2020           After Visit Summary Signature Page    I have received my discharge instructions, and my questions have been answered. I have discussed any challenges I see with this plan with the nurse or doctor.    ..........................................................................................................................................  Patient/Patient Representative Signature      ..........................................................................................................................................  Patient Representative Print Name and Relationship to Patient    ..................................................               ................................................  Date                                   Time    ..........................................................................................................................................  Reviewed by Signature/Title    ...................................................              ..............................................  Date                                               Time          22EPIC Rev 08/18

## 2020-01-29 NOTE — DISCHARGE INSTRUCTIONS
Thank you for choosing [facility].     Please closely monitor for further symptoms. Return to the Emergency Department if you develop any new or worsening signs or symptoms.    If you received any opiate pain medications or sedatives during your visit, please do not drive for at least 8 hours.     Labs, cultures or final xray interpretations may still need to be reviewed.  We will call you if your plan of care needs to be changed.    Please make an appointment to follow up with Your Primary Care Provider and Neurology Clinic (phone: [phone]) as soon as possible, and referral was made through the emergency department today.

## 2020-01-29 NOTE — ED PROVIDER NOTES
"    [facility] EMERGENCY DEPARTMENT ([facility])    1/29/20       History     Chief Complaint   Patient presents with     Back Pain     States he has a cyst on his spine and it is making his left hand go numb.  Walks wtih a limp.  Getting worse 3 weeks ago and his PMD told him to go to the ED.     The history is provided by the patient.     Kayden Cortez is a 49 year old male with a medical history significant for chronic right-sided back pain.  Per the patient, the patient had a \"cyst on the spine\" and underwent some sort of \"debulking\" in the remote past.    Patient presents to the Emergency Department today for evaluation of left-sided lower back pain.  The patient reports that 2 weeks ago his right-sided chronic lower back pain symptoms resolved, but he has had onset of the same symptoms on his left side.  Patient reports that the pain starts in his left, lower back and radiates all the way down his left leg to his foot.  The patient reports that the pain also radiates up into his left shoulder and down his left arm.  The patient reports that he is having numbness in his left forearm and left hand that caused him to drop a coffee cup this morning.  The patient denies any neck pain, vision changes or speech difficulties.  He also denies any fevers, chills, nausea, vomiting or abdominal pain.  He denies any history of IV drug use.  The patient does note that he has had 12 mechanical falls in the last 3 months.  The patient reports that he has difficulty walking secondary to the lower back pain.  The patient denies any head traumas or loss of consciousness during any of these falls.    I have reviewed the Medications, Allergies, Past Medical and Surgical History, and Social History in the [facility] system.    Past Medical History:   Diagnosis Date     CAD (coronary artery disease) 2000     Depressive disorder      Myocardial infarction (H)     3 MI's     Uncomplicated asthma        Past Surgical History: "   Procedure Laterality Date     APPENDECTOMY       BACK SURGERY  2000    Shaved cysst on spine     CHOLECYSTECTOMY       ORTHOPEDIC SURGERY      Bilateral rotator cuff.  Left shoulder tendon repair.  right foot twice.       No family history on file.    Social History     Tobacco Use     Smoking status: Current Every Day Smoker     Packs/day: 0.25     Smokeless tobacco: Never Used   Substance Use Topics     Alcohol use: Not Currently       No current facility-administered medications for this encounter.      Current Outpatient Medications   Medication     ALBUTEROL IN     aspirin (ASA) 325 MG tablet     atenolol (TENORMIN) 25 MG tablet     budesonide-formoterol (SYMBICORT) 160-4.5 MCG/ACT Inhaler     Cholecalciferol (VITAMIN D3) 50 MCG (2000 UT) CAPS     gabapentin (NEURONTIN) 300 MG capsule     lurasidone (LATUDA) 80 MG TABS tablet     Pregabalin (LYRICA PO)     tiZANidine (ZANAFLEX) 4 MG tablet     nitroGLYcerin (NITROSTAT) 0.4 MG sublingual tablet        Allergies   Allergen Reactions     Vicodin [Hydrocodone-Acetaminophen] Nausea         Review of Systems   Constitutional: Negative for chills and fever.   Eyes: Negative for visual disturbance.   Gastrointestinal: Negative for abdominal pain, nausea and vomiting.   Musculoskeletal: Positive for back pain (left, lower with radiation as described above in HPI). Negative for neck pain.   Neurological: Positive for numbness (left forearm and hand). Negative for syncope, speech difficulty and weakness.   All other systems reviewed and are negative.      Physical Exam   BP: 113/84  Pulse: 91  Temp: 98.1  F (36.7  C)  Resp: 18  Weight: 105.4 kg (232 lb 6 oz)  SpO2: 100 %      Physical Exam  Constitutional:       General: He is not in acute distress.     Appearance: He is not diaphoretic.   HENT:      Head: Atraumatic.   Eyes:      General: No scleral icterus.     Pupils: Pupils are equal, round, and reactive to light.   Cardiovascular:      Heart sounds: Normal heart  sounds.   Pulmonary:      Effort: No respiratory distress.      Breath sounds: Normal breath sounds.   Abdominal:      General: Bowel sounds are normal.      Palpations: Abdomen is soft.      Tenderness: There is no abdominal tenderness.   Musculoskeletal:         General: No tenderness.   Skin:     General: Skin is warm.      Findings: No rash.   Neurological:      Mental Status: He is alert and oriented to person, place, and time.      Cranial Nerves: Cranial nerves are intact.      Sensory: Sensory deficit (Patient reports some diminished sensation in stocking-like distribution to the mid calf in his lower extremities.  He has similar glovelike distribution on the left hand.) present.      Motor: Motor function is intact.      Coordination: Coordination is intact.      Deep Tendon Reflexes: Reflexes are normal and symmetric.         ED Course        Procedures             Critical Care time:  none             Labs Ordered and Resulted from Time of ED Arrival Up to the Time of Departure from the ED   COMPREHENSIVE METABOLIC PANEL - Abnormal; Notable for the following components:       Result Value    Glucose 101 (*)     Calcium 8.2 (*)     Bilirubin Total 0.1 (*)     Albumin 3.1 (*)     Protein Total 6.6 (*)     ALT 85 (*)     All other components within normal limits   CRP INFLAMMATION - Abnormal; Notable for the following components:    CRP Inflammation 8.4 (*)     All other components within normal limits   CBC WITH PLATELETS DIFFERENTIAL   ERYTHROCYTE SEDIMENTATION RATE AUTO   TSH WITH FREE T4 REFLEX            Assessments & Plan (with Medical Decision Making)   Patient with a history of chronic back pain, known lumbar and cervical degenerative disc disease, presenting complaining of paresthesias in the left lower extremity and left upper extremity as well as left lower back pain.  Differential diagnosis initially included life-threatening or serious causes of paresthesias such as stroke, Guillain-Barré,  electrolyte disturbance, compression of spinal cord (due to disc disease/hematoma/abscess/neoplasm) transverse myelitis, paraneoplastic syndrome, primary spinal cord lesion, MS, however clinical scenario is more consistent with a peripheral neuropathy than any of these life or limb threatening causes.  Differential diagnosis of peripheral neuropathy is broad including thyroid disease, diabetes, vitamin D deficiency, HIV disease, vasculitis, hepatitis B or C, other undifferentiated demyelinating disease.  His exam reveals some subjective sensory deficit in stocking glove type distribution, otherwise unremarkable neurologic exam.  A CT scan of his head was negative, his labs are also unremarkable.  Patient was reporting significant discomfort from the symptoms and was given a single dose of oxycodone which provided very good pain relief.  Patient is now requesting discharge, and states that he would prefer to follow-up as an outpatient.  I will make a referral to neurology is no definite etiology of his symptoms is discovered and I would defer any imaging until that evaluation as he does not have any medical signs of cauda equina syndrome, stroke, or other immediate threat to life or permanent disability.  We discussed the indications for emergency department return and follow-up.  Stable for discharge.    I have reviewed the nursing notes.    I have reviewed the findings, diagnosis, plan and need for follow up with the patient.    New Prescriptions    No medications on file       Final diagnoses:   Paresthesias   Chronic bilateral low back pain, unspecified whether sciatica present     I, Alexis Quinones, am serving as a trained medical scribe to document services personally performed by Allen Palomino MD, based on the provider's statements to me.   I, Allen Palomino MD, was physically present and have reviewed and verified the accuracy of this note documented by Alexis Quinones.    1/29/2020   [facility], [place],  EMERGENCY DEPARTMENT     Allen Palomino MD  01/29/20 0176

## 2020-01-29 NOTE — LETTER
January 29, 2020      To Whom It May Concern:      Kayden Cortez was seen in our Emergency Department today, 01/29/20.  I expect his condition to improve over the next 2-3 days.  He may return to normal work/activity when improved.    Sincerely,        Allen Palomino MD

## 2020-01-29 NOTE — TELEPHONE ENCOUNTER
RECORDS RECEIVED FROM: Internal   Date of Appt: 2/3/20   NOTES (FOR ALL VISITS) STATUS DETAILS   OFFICE NOTE from referring provider N/A    OFFICE NOTE from other specialist N/A    DISCHARGE SUMMARY from hospital N/A    DISCHARGE REPORT from the ER Internal [facility]:  1/29/20   OPERATIVE REPORT N/A    MEDICATION LIST Internal    IMAGING  (FOR ALL VISITS)     EMG N/A    EEG N/A    MRI (HEAD, NECK, SPINE) N/A    LUMBAR PUNCTURE N/A    JESSE Scan N/A    CT (HEAD, NECK, SPINE) In process [facility]:  CT Head 1/29/20    [facility]:  CT Head 8/2/19  CT Cervical Spine 4/24/19      Action 1/29/20 MV 3.35pm   Action Taken Imaging request faxed to [facility] for:  CT Head 8/2/19  CT Cervical Spine 4/24/19

## 2020-01-31 ENCOUNTER — DOCUMENTATION ONLY (OUTPATIENT)
Dept: CARE COORDINATION | Facility: CLINIC | Age: 50
End: 2020-01-31

## 2020-02-03 ENCOUNTER — RECORDS - HEALTHEAST (OUTPATIENT)
Dept: ADMINISTRATIVE | Facility: OTHER | Age: 50
End: 2020-02-03

## 2020-02-03 ENCOUNTER — PRE VISIT (OUTPATIENT)
Dept: NEUROLOGY | Facility: CLINIC | Age: 50
End: 2020-02-03

## 2020-02-03 ENCOUNTER — OFFICE VISIT (OUTPATIENT)
Dept: NEUROLOGY | Facility: CLINIC | Age: 50
End: 2020-02-03
Attending: FAMILY MEDICINE
Payer: COMMERCIAL

## 2020-02-03 VITALS
OXYGEN SATURATION: 98 % | WEIGHT: 234.9 LBS | RESPIRATION RATE: 17 BRPM | BODY MASS INDEX: 30.15 KG/M2 | HEART RATE: 91 BPM | SYSTOLIC BLOOD PRESSURE: 108 MMHG | DIASTOLIC BLOOD PRESSURE: 77 MMHG | HEIGHT: 74 IN

## 2020-02-03 DIAGNOSIS — M54.16 LUMBAR RADICULOPATHY: ICD-10-CM

## 2020-02-03 DIAGNOSIS — M54.12 CERVICAL RADICULOPATHY: Primary | ICD-10-CM

## 2020-02-03 RX ORDER — PREDNISONE 20 MG/1
TABLET ORAL
Qty: 19 TABLET | Refills: 0 | Status: SHIPPED | OUTPATIENT
Start: 2020-02-03 | End: 2020-02-21

## 2020-02-03 RX ORDER — DIAZEPAM 5 MG
TABLET ORAL
Qty: 2 TABLET | Refills: 0 | Status: SHIPPED | OUTPATIENT
Start: 2020-02-03

## 2020-02-03 ASSESSMENT — MIFFLIN-ST. JEOR: SCORE: 1995.25

## 2020-02-03 ASSESSMENT — PAIN SCALES - GENERAL: PAINLEVEL: NO PAIN (0)

## 2020-02-03 NOTE — PROGRESS NOTES
"Service Date: 2020      Aly Constantino MD   [facility]    [address] [place] [address]      RE: Kayden Cortez   MRN: [id number]   : 1970      Dear Aly:      I saw your patient, Kayden Cortez, for neurologic evaluation on 2020.  He is a 49-year-old right-handed male here for evaluation of left arm pain and paresthesias as well as left lower extremity pain and paresthesias.      About 6 weeks ago he began having a sense of numbness and tingling involving his left arm extending from the shoulder down the dorsal aspect of the forearm and into the predominantly second, third and fourth digits of the left hand.  Coincident with this, he has had a lot of pain around his left shoulder that will radiate into the left arm.  He feels his  strength has diminished.      He also has started developing pain and paresthesias in the left leg that seem to correspond best to left L5 and S1 dermatomes.  He does have a history of chronic low back pain.  He recalls 15 years ago he had surgery on the lumbar spine for problems with one or the other leg.  Reviewing reports of imaging studies, it is notable that he had a lumbar MRI scan done in 2014 that revealed he had a right L4-L5 hemilaminectomy without nerve root compression.  In addition to the pain and paresthesias in the left leg he also has a \"weird feeling\" involving the toes of his right foot.  He denies any trouble with bowel or bladder control.      PAST MEDICAL HISTORY:  The patient's past history is notable for coronary artery disease.  He tells me he had an angiogram done in the past and during the procedure it \"released the clot.\"  He did not require a stent.  He also has a history of asthma and bipolar disorder.      CURRENT MEDICATIONS:   1.  Albuterol.   2.  Aspirin 325 mg.   3.  Atenolol.   4.  Symbicort.   5.  Gabapentin 300 mg twice a day.   6.  Latuda.   7.  Nitrostat p.r.n.   8.  Lyrica.   9.  Tizanidine. "   10.  Vitamin D.      ALLERGIES:  He has no medical allergies but hydrocodone/acetaminophen causes nausea.      SOCIAL HISTORY:  He works as a .  He has cut down his smoking to 4 cigarettes a day.  He does not use alcohol.      PHYSICAL EXAMINATION:   GENERAL:  Reveals a patient who appears intermittently uncomfortable.   VITAL SIGNS:  Heart rate 91.  Blood pressure 108/77.   NEUROLOGIC:    Foraminal compression testing to the left does reproduce his left upper extremity symptoms.      Straight leg raising on the left produces discomfort in the left gluteal region.      Cranial nerves II-XII are intact.  Motor examination reveals he tends to break when I test left shoulder abduction, probably due to pain.  Otherwise, there is no definite weakness in the left upper extremity.  In the lower extremities, he tends to break when I test ankle dorsiflexion on the left and plantar flexion as well.      On sensory testing, he reports decreased pinprick involving the second, third and fourth digits of the left hand as well as the dorsal forearm.  In the lower extremities, he reports decreased pinprick involving the dorsal aspect of the left foot.  The patient indicates he has reduction of vibratory appreciation in his left index finger.  Position sense is intact.  Reflexes are 1-2+ in the upper extremities with a relative decrease in his left triceps jerk compared to the right.  Knee jerks 1+.  Ankle jerks absent.  Plantar responses are flexor.      IMPRESSION:   1.  Left upper extremity pain and paresthesias which I suspect could represent a cervical radiculopathy and possibly a left C7 radiculopathy.   2.  Left lower extremity pain and paresthesias which I suspect might represent a lumbar radiculopathy (L5-S1).      PLAN:  He is quite uncomfortable and I am going to move forward with the imaging of the cervical and lumbar spine.  He is going to have MRI scans done.      I have given a prescription  for a 10-day course of prednisone to see if this will help the pain.  I discussed using the prednisone within the context of his history of bipolar disorder.  I told him if the prednisone makes him feel anxious or agitated, he should stop it.      He is going to try to limit his work activities including heavy lifting and he thinks he might be able to do so.      I will be seeing him back to review imaging studies.      MD LUDIVINA Garcia MD             D: 2020   T: 2020   MT: AKA      Name:     DHAVAL OWEN   MRN:      [id number]        Account:      [id number]   :      1970           Service Date: 2020      Document: [id number]

## 2020-02-03 NOTE — NURSING NOTE
Chief Complaint   Patient presents with     Consult     Paresthesias      Medications reviewed and vital signs taken.   Jessica Meza CMA

## 2020-02-03 NOTE — LETTER
"     RE: Kayden Cortez   [place] [place] [address]     Dear Colleague,    Thank you for referring your patient, Kayden Cortez, to the [facility] NEUROLOGY at [facility]. Please see a copy of my visit note below.    Service Date: 2020      Aly Constantino MD   [facility]    [address] [place] [address]      RE: Kayden Cortez   MRN: [id number]   : 1970      Dear Aly:      I saw your patient, Kayden Cortez, for neurologic evaluation on 2020.  He is a 49-year-old right-handed male here for evaluation of left arm pain and paresthesias as well as left lower extremity pain and paresthesias.      About 6 weeks ago he began having a sense of numbness and tingling involving his right arm extending from the shoulder down the dorsal aspect of the forearm and into the predominantly second, third and fourth digits of the left hand.  Coincident with this, he has had a lot of pain around his left shoulder that will radiate into the left arm.  He feels his  strength has diminished.      He also has started developing pain and paresthesias in the left leg that seem to correspond best to left L5 and S1 dermatomes.  He does have a history of chronic low back pain.  He recalls 15 years ago he had surgery on the lumbar spine for problems with one or the other leg.  Reviewing reports of imaging studies, it is notable that he had a lumbar MRI scan done in 2014 that revealed he had a right L4-L5 hemilaminectomy without nerve root compression.  In addition to the pain and paresthesias in the left leg he also has a \"weird feeling\" involving the toes of his right foot.  He denies any trouble with bowel or bladder control.      PAST MEDICAL HISTORY:  The patient's past history is notable for coronary artery disease.  He tells me he had an angiogram done in the past and during the procedure it \"released the clot.\"  He did not require a " stent.  He also has a history of asthma and bipolar disorder.      CURRENT MEDICATIONS:   1.  Albuterol.   2.  Aspirin 325 mg.   3.  Atenolol.   4.  Symbicort.   5.  Gabapentin 300 mg twice a day.   6.  Latuda.   7.  Nitrostat p.r.n.   8.  Lyrica.   9.  Tizanidine.   10.  Vitamin D.      ALLERGIES:  He has no medical allergies but hydrocodone/acetaminophen causes nausea.      SOCIAL HISTORY:  He works as a .  He has cut down his smoking to 4 cigarettes a day.  He does not use alcohol.      PHYSICAL EXAMINATION:   GENERAL:  Reveals a patient who appears intermittently uncomfortable.   VITAL SIGNS:  Heart rate 91.  Blood pressure 108/77.   NEUROLOGIC:    Foraminal compression testing to the left does reproduce his left upper extremity symptoms.      Straight leg raising on the left produces discomfort in the left gluteal region.      Cranial nerves II-XII are intact.  Motor examination reveals he tends to break when I test left shoulder abduction, probably due to pain.  Otherwise, there is no definite weakness in the left upper extremity.  In the lower extremities, he tends to break when I test ankle dorsiflexion on the left and plantar flexion as well.      On sensory testing, he reports decreased pinprick involving the second, third and fourth digits of the left hand as well as the dorsal forearm.  In the lower extremities, he reports decreased pinprick involving the dorsal aspect of the left foot.  The patient indicates he has reduction of vibratory appreciation in his left index finger.  Position sense is intact.  Reflexes are 1-2+ in the upper extremities with a relative decrease in his left triceps jerk compared to the right.  Knee jerks 1+.  Ankle jerks absent.  Plantar responses are flexor.      IMPRESSION:   1.  Left upper extremity pain and paresthesias which I suspect could represent a cervical radiculopathy and possibly a left C7 radiculopathy.   2.  Left lower extremity pain and  paresthesias which I suspect might represent a lumbar radiculopathy (L5-S1).      PLAN:  He is quite uncomfortable and I am going to move forward with the imaging of the cervical and lumbar spine.  He is going to have MRI scans done.      I have given a prescription for a 10-day course of prednisone to see if this will help the pain.  I discussed using the prednisone within the context of his history of bipolar disorder.  I told him if the prednisone makes him feel anxious or agitated, he should stop it.      He is going to try to limit his work activities including heavy lifting and he thinks he might be able to do so.      I will be seeing him back to review imaging studies.      Michele Escobar MD        D: 2020   T: 2020   MT: AKA      Name:     DHAVAL OWEN   MRN:      [id number]        Account:      [id number]   :      1970           Service Date: 2020      Document: [id number]

## 2020-02-04 ENCOUNTER — TELEPHONE (OUTPATIENT)
Dept: NEUROLOGY | Facility: CLINIC | Age: 50
End: 2020-02-04

## 2020-02-04 NOTE — TELEPHONE ENCOUNTER
[facility] Call Center    Phone Message    May a detailed message be left on voicemail: yes     Reason for Call: Other: Keeley stated that pharmacy cant dispense Valium script because its a controlled substance so it needs to come from the provider, please call back to discuss.     Action Taken: Message routed to:  [facility] ([facility] Neuro     Travel Screening: Not Applicable

## 2020-02-05 ENCOUNTER — APPOINTMENT (OUTPATIENT)
Dept: MRI IMAGING | Facility: CLINIC | Age: 50
End: 2020-02-05
Payer: COMMERCIAL

## 2020-02-05 ENCOUNTER — HOSPITAL ENCOUNTER (EMERGENCY)
Facility: CLINIC | Age: 50
Discharge: HOME | End: 2020-02-05
Attending: EMERGENCY MEDICINE | Admitting: EMERGENCY MEDICINE
Payer: COMMERCIAL

## 2020-02-05 ENCOUNTER — RECORDS - HEALTHEAST (OUTPATIENT)
Dept: ADMINISTRATIVE | Facility: OTHER | Age: 50
End: 2020-02-05

## 2020-02-05 ENCOUNTER — CARE COORDINATION (OUTPATIENT)
Dept: NEUROLOGY | Facility: CLINIC | Age: 50
End: 2020-02-05

## 2020-02-05 VITALS
SYSTOLIC BLOOD PRESSURE: 121 MMHG | TEMPERATURE: 98.1 F | WEIGHT: 234.9 LBS | DIASTOLIC BLOOD PRESSURE: 84 MMHG | BODY MASS INDEX: 30.15 KG/M2 | HEIGHT: 74 IN | HEART RATE: 85 BPM | OXYGEN SATURATION: 100 % | RESPIRATION RATE: 14 BRPM

## 2020-02-05 DIAGNOSIS — M54.16 LUMBAR RADICULOPATHY: ICD-10-CM

## 2020-02-05 DIAGNOSIS — M54.12 CERVICAL RADICULOPATHY: ICD-10-CM

## 2020-02-05 DIAGNOSIS — G95.20 CERVICAL SPINAL CORD COMPRESSION (H): ICD-10-CM

## 2020-02-05 PROCEDURE — 25000131 ZZH RX MED GY IP 250 OP 636 PS 637: Performed by: EMERGENCY MEDICINE

## 2020-02-05 PROCEDURE — 72148 MRI LUMBAR SPINE W/O DYE: CPT

## 2020-02-05 PROCEDURE — 25000132 ZZH RX MED GY IP 250 OP 250 PS 637: Performed by: STUDENT IN AN ORGANIZED HEALTH CARE EDUCATION/TRAINING PROGRAM

## 2020-02-05 PROCEDURE — 72141 MRI NECK SPINE W/O DYE: CPT

## 2020-02-05 PROCEDURE — 99285 EMERGENCY DEPT VISIT HI MDM: CPT | Mod: GC | Performed by: EMERGENCY MEDICINE

## 2020-02-05 PROCEDURE — 99284 EMERGENCY DEPT VISIT MOD MDM: CPT | Mod: 25 | Performed by: EMERGENCY MEDICINE

## 2020-02-05 RX ORDER — DIAZEPAM 5 MG
5 TABLET ORAL
Status: COMPLETED | OUTPATIENT
Start: 2020-02-05 | End: 2020-02-05

## 2020-02-05 RX ORDER — DEXAMETHASONE 4 MG/1
8 TABLET ORAL ONCE
Status: COMPLETED | OUTPATIENT
Start: 2020-02-05 | End: 2020-02-05

## 2020-02-05 RX ORDER — OXYCODONE HYDROCHLORIDE 5 MG/1
10 TABLET ORAL ONCE
Status: COMPLETED | OUTPATIENT
Start: 2020-02-05 | End: 2020-02-05

## 2020-02-05 RX ADMIN — DIAZEPAM 5 MG: 5 TABLET ORAL at 10:05

## 2020-02-05 RX ADMIN — OXYCODONE HYDROCHLORIDE 10 MG: 5 TABLET ORAL at 10:09

## 2020-02-05 RX ADMIN — DEXAMETHASONE 8 MG: 4 TABLET ORAL at 15:12

## 2020-02-05 ASSESSMENT — ENCOUNTER SYMPTOMS
NUMBNESS: 1
SHORTNESS OF BREATH: 0
NECK PAIN: 1
FEVER: 0
ABDOMINAL PAIN: 0
BACK PAIN: 1
VOMITING: 0
WEAKNESS: 1

## 2020-02-05 ASSESSMENT — MIFFLIN-ST. JEOR: SCORE: 1995.25

## 2020-02-05 NOTE — TELEPHONE ENCOUNTER
Rx Authorization:    Requested Medication/ Dose:5mg    Date last refill ordered: 2/3/20    Quantity ordered: 2tabs    # refills:     Date of last clinic visit with ordering provider: 2/3/20    Date of next clinic visit with ordering provider: 2/21/20    All pertinent protocol data (lab date/result):     Include pertinent information from patients message:

## 2020-02-05 NOTE — CONSULTS
[facility]       NEUROSURGERY CONSULTATION NOTE    This consultation was requested by Dr. Chaney from the ED service.    Reason for Consultation: MRI findings of cervical and lumbar stenosis    HPI: Kayden Cortez is a 49 year old male who was seen for left arm and leg numbness and pain. He states he had pain in his right arm and leg 6 weeks ago. He then noticed about a week later that his symptoms switched to the left side. He no longer feels any issues on the right. He states the numbness in his arm starts in the shoulder is in the back of the arm down to fingers 2-4. His arm symptoms are mostly numbness. He feels pain in his left leg going down the back/side of his leg down the calf and into the top of his foot. The pain is a sharp burning pain, though he occasionally gets sharp shooting pain in the same distribution. He denies any issues with bowel or bladder function. He does feel like he is weak on his left side, though it is mostly pain limited. He states he has dropped things with his left hand due to numbness. He has not had any issues with his right hand, and he has not noticed any issues with writing or dexterity on the right side.    Of note, he saw neurology in clinic 2 days ago and they recommended prednisone, and MRI imaging. He states he was in too much pain to wait so he came to the ED. He was not able to fill his prednisone until last night and did not take any pills until one this AM.    PAST MEDICAL HISTORY:   Past Medical History:   Diagnosis Date     CAD (coronary artery disease) 2000     Depressive disorder      Myocardial infarction (H)     3 MI's     Uncomplicated asthma        PAST SURGICAL HISTORY:   Past Surgical History:   Procedure Laterality Date     APPENDECTOMY       BACK SURGERY  2000    Shaved cysst on spine     CHOLECYSTECTOMY       ORTHOPEDIC SURGERY      Bilateral rotator cuff.  Left shoulder tendon repair.  right foot twice.  "      FAMILY HISTORY: History reviewed. No pertinent family history.    SOCIAL HISTORY:   Social History     Tobacco Use     Smoking status: Current Every Day Smoker     Packs/day: 0.25     Smokeless tobacco: Never Used   Substance Use Topics     Alcohol use: Not Currently       MEDICATIONS:  (Not in a hospital admission)      Allergies:  Allergies   Allergen Reactions     Vicodin [Hydrocodone-Acetaminophen] Nausea       ROS: 10 point ROS of systems including Constitutional, Eyes, Respiratory, Cardiovascular, Gastroenterology, Genitourinary, Integumentary, Muscularskeletal, Psychiatric were all negative except for pertinent positives noted in my HPI.    Physical exam:   Blood pressure 105/73, pulse 75, temperature 98.1  F (36.7  C), temperature source Oral, resp. rate 14, height 1.88 m (6' 2\"), weight 106.5 kg (234 lb 14.4 oz), SpO2 97 %.  PULM: breathing comfortably on room air  NEUROLOGIC:  -- Awake; Alert; oriented x 3  -- Follows commands briskly  -- Speech fluent, spontaneous. No aphasia or dysarthria.  -- no gaze preference. No apparent hemineglect.  Cranial Nerves:  -- visual fields full to confrontation, PERRL 3-2mm bilat and brisk, extraocular movements intact  -- face symmetrical, tongue midline  -- sensory V1-V3 intact bilaterally  -- palate elevates symmetrically, uvula midline  -- hearing grossly intact bilat  -- Trapezii 5/5 strength bilat symmetric    Motor:  Normal bulk / tone; no tremor, rigidity, or bradykinesia.  No muscle wasting or fasciculations     Delt Bi Tri Hand Flexion/  Extension Iliopsoas Quadriceps Hamstrings Tibialis Anterior Gastroc    C5 C6 C7 C8/T1 L2 L3 L4-S1 L4 S1   R 5 5 5 5 5 5 5 5 5   L 4* 4* 4* 4* 4* 4* 4* 4* 4*   *Pain/Effort limited, with occasional giveaway weakness  Sensory:  Diminished over left C7 and left L5 distribution    Reflexes:     Bi BR Brian Pat Ach Bab    C5-6 C6 UMN L2-4 S1 UMN   R 2+ 2+ Norm 2+ 2+ Norm   L 2+ 2+ Norm 2+ 2+ Norm   Negative clonus " bilaterally    Gait: Deferred    Rectal Tone: Intact      LABS:  Last Comprehensive Metabolic Panel:  Sodium   Date Value Ref Range Status   01/29/2020 139 133 - 144 mmol/L Final     Potassium   Date Value Ref Range Status   01/29/2020 4.4 3.4 - 5.3 mmol/L Final     Chloride   Date Value Ref Range Status   01/29/2020 108 94 - 109 mmol/L Final     Carbon Dioxide   Date Value Ref Range Status   01/29/2020 28 20 - 32 mmol/L Final     Anion Gap   Date Value Ref Range Status   01/29/2020 3 3 - 14 mmol/L Final     Glucose   Date Value Ref Range Status   01/29/2020 101 (H) 70 - 99 mg/dL Final     Urea Nitrogen   Date Value Ref Range Status   01/29/2020 15 7 - 30 mg/dL Final     Creatinine   Date Value Ref Range Status   01/29/2020 0.76 0.66 - 1.25 mg/dL Final     GFR Estimate   Date Value Ref Range Status   01/29/2020 >90 >60 mL/min/[1.73_m2] Final     Comment:     Non  GFR Calc  Starting 12/18/2018, serum creatinine based estimated GFR (eGFR) will be   calculated using the Chronic Kidney Disease Epidemiology Collaboration   (CKD-EPI) equation.       Calcium   Date Value Ref Range Status   01/29/2020 8.2 (L) 8.5 - 10.1 mg/dL Final     Lab Results   Component Value Date    WBC 10.0 01/29/2020     Lab Results   Component Value Date    RBC 4.42 01/29/2020     Lab Results   Component Value Date    HGB 13.3 01/29/2020     Lab Results   Component Value Date    HCT 41.4 01/29/2020     Lab Results   Component Value Date    MCV 94 01/29/2020     Lab Results   Component Value Date    MCH 30.1 01/29/2020     Lab Results   Component Value Date    MCHC 32.1 01/29/2020     Lab Results   Component Value Date    RDW 12.5 01/29/2020     Lab Results   Component Value Date     01/29/2020       IMAGING:  Lumbar spine MRI w/o contrast   Preliminary Result   Impression:    1. Multilevel degenerative changes of the lumbar spine, greatest at   L5-S1, where there is severe bilateral neural foraminal stenosis with   abutment  of the exiting nerve roots from a broad-based disc bulge.   2. Mild-moderate left neural foraminal and spinal canal stenosis at   L4-5.         Cervical spine MRI w/o contrast   Final Result   Impression:     1. Multilevel cervical spine degenerative disease, most prominent   finding being the moderate spinal canal stenosis and cord compression   at C3-4, with bilateral neural foraminal stenoses as well.   2. No abnormal signal within the cervical spinal cord.   3. Grade 1 retrolisthesis of C3 on C4, likely related to underlying   degenerative disease.      JULIO NORTON MD          ASSESSMENT:  49 year old male who presents with 6 week history of radicular type symptoms. He has only taken one dose of prednisone, and has not tried conservative measures. He is not hyperreflexic and does not show any signs of myelopathy. Imaging does not show any cord signal change though there is stenosis present.    RECOMMENDATIONS:  -No neurosurgical intervention indicated at this time   -Recommend 8 mg decadron one time dose while in ED  -Recommend continuing to take his prescribed steroids to help with inflammation  -Recommend seeing PT and pain clinic as an outpatient for pain management  -Follow up with Neurosurgery NP in clinic in 4-6 weeks if pain has not improved at that time    The patient was discussed with Dr. Perez, neurosurgery chief resident, and Dr. Fox, neurosurgery staff, and they agree with the above.    Rosa Song MD  Neurosurgery Resident, PGY-1

## 2020-02-05 NOTE — TELEPHONE ENCOUNTER
[facility] Call Center    Phone Message    May a detailed message be left on voicemail: yes     Reason for Call: Medication Refill Request    Has the patient contacted the pharmacy for the refill? Yes   Name of medication being requested: diazepam (VALIUM) 5 MG tablet  Provider who prescribed the medication:   Pharmacy: [facility] pharmacy [phone]  Date medication is needed: asap  - pt is switching pharmacies and they were not able to transfer valium to new pharmacy, please send rx to [facility] thanks!      Action Taken: Message routed to:  [facility] ([facility] neuro    Travel Screening: Not Applicable

## 2020-02-05 NOTE — DISCHARGE INSTRUCTIONS
Continue Prednisone as prescribed. Neurosurgery advises Physical therapy and follow up in neurosurgery clinic in 4-6 weeks.     Please make an appointment to follow up with Your Primary Care Provider in 5 days for continued pain management and Neurosurgery Clinic (phone: [phone]) at next available appointment.    Return to the Emergency Department if you have loss of bowel or bladder control, worsening pain or weakness, fever, any other concerns.

## 2020-02-05 NOTE — ED AVS SNAPSHOT
[facility], [place], Emergency Department  [address] [facility] [address]  Phone:  [phone]                                    Kayden Cortez   MRN: [id number]    Department:  [facility], Emergency Department   Date of Visit:  2/5/2020           After Visit Summary Signature Page    I have received my discharge instructions, and my questions have been answered. I have discussed any challenges I see with this plan with the nurse or doctor.    ..........................................................................................................................................  Patient/Patient Representative Signature      ..........................................................................................................................................  Patient Representative Print Name and Relationship to Patient    ..................................................               ................................................  Date                                   Time    ..........................................................................................................................................  Reviewed by Signature/Title    ...................................................              ..............................................  Date                                               Time          22EPIC Rev 08/18

## 2020-02-05 NOTE — ED PROVIDER NOTES
History     Chief Complaint   Patient presents with     Leg Pain     Arm Pain     HPI  Kayden Cortez is a 49 year old male with a medical history significant for CAD and bipolar disorder who presents for inability to get his MRI scans.     Mr Cortez has been having left sided arm and leg pains for which he saw a neurologist 2 days ago on 2/3/20. During that appointment he was evaluated for this pain that has been ongoing for about 6 weeks. His neurology exam at the time of his appointment was significant for pain and paresthesias corresponding with possibly C7 as well as L5-S1 radiculopathy. He was given prednisone course and advised to having MRI of C-spine and L-spine. He presented to the hospital today to have this imaging however was too uncomfortable during the scan and was not able to tolerate the MRI. He was given 5 mg of valium and tried to go upstairs to neuro clinic but was not able to be seen and was sent down to the ED. He is here to obtain pain control in order to get his MRI today.     I have reviewed the Medications, Allergies, Past Medical and Surgical History, and Social History in the [address] system.    Past Medical History:   Diagnosis Date     CAD (coronary artery disease) 2000     Depressive disorder      Myocardial infarction (H)     3 MI's     Uncomplicated asthma        Past Surgical History:   Procedure Laterality Date     APPENDECTOMY       BACK SURGERY  2000    Shaved cysst on spine     CHOLECYSTECTOMY       ORTHOPEDIC SURGERY      Bilateral rotator cuff.  Left shoulder tendon repair.  right foot twice.       History reviewed. No pertinent family history.    Social History     Tobacco Use     Smoking status: Current Every Day Smoker     Packs/day: 0.25     Smokeless tobacco: Never Used   Substance Use Topics     Alcohol use: Not Currently         Review of Systems   Constitutional: Negative for fever.   Respiratory: Negative for shortness of breath.    Cardiovascular: Negative for chest  "pain.   Gastrointestinal: Negative for abdominal pain and vomiting.   Musculoskeletal: Positive for back pain and neck pain.   Neurological: Positive for weakness and numbness.   All other systems reviewed and are negative.      Physical Exam   BP: 100/75  Pulse: 85  Temp: 98.1  F (36.7  C)  Resp: 14  Height: 188 cm (6' 2\")  Weight: 106.5 kg (234 lb 14.4 oz)  SpO2: 97 %    Physical Exam  Constitutional:       General: He is not in acute distress.     Appearance: He is not ill-appearing.      Comments: sleepy   HENT:      Head: Normocephalic and atraumatic.      Mouth/Throat:      Mouth: Mucous membranes are moist.   Eyes:      Extraocular Movements: Extraocular movements intact.      Conjunctiva/sclera: Conjunctivae normal.      Pupils: Pupils are equal, round, and reactive to light.   Neck:      Musculoskeletal: Normal range of motion and neck supple. No neck rigidity or muscular tenderness.   Cardiovascular:      Rate and Rhythm: Normal rate and regular rhythm.      Pulses: Normal pulses.      Heart sounds: Normal heart sounds. No murmur.   Pulmonary:      Effort: Pulmonary effort is normal.      Breath sounds: Normal breath sounds.   Abdominal:      General: There is no distension.      Palpations: Abdomen is soft.      Tenderness: There is no abdominal tenderness. There is no guarding.   Musculoskeletal:         General: No swelling or tenderness.   Skin:     General: Skin is warm and dry.   Neurological:      Mental Status: He is oriented to person, place, and time.      Cranial Nerves: No cranial nerve deficit.      Comments: Functional weakness of L upper and lower extremity. 3/5 strength in L extremities, able to raise to gravity only. 5/5 strength in R extremities. Decreased sensation of the L upper and lower extremities compared to R extremities.    Psychiatric:         Behavior: Behavior normal.       ED Course   Pt arrived to the ED from MRI where he had gotten 5 mg Diazepam but was unable to complete " his scans from discomfort and pain.     Assessed in the ED.  Oxycodone provided for pain in anticipation of scan  MRI C-spine and L-spine ordered    Lumbar spine MRI w/o contrast   Final Result   Impression:    1. Multilevel degenerative changes of the lumbar spine, greatest at   L5-S1, where there is moderate to severe  bilateral neural foraminal   stenosis with abutment of the exiting nerve roots.   2. Mild bilateral neural foraminal and spinal canal stenosis at L4-5.      I have personally reviewed the examination and initial interpretation   and I agree with the findings.      CHALINO LEAVITT MD      Cervical spine MRI w/o contrast   Final Result   Impression:     1. Multilevel cervical spine degenerative disease, most prominent   finding being the moderate spinal canal stenosis and cord compression   at C3-4, with bilateral neural foraminal stenoses as well.   2. No abnormal signal within the cervical spinal cord.   3. Grade 1 retrolisthesis of C3 on C4, likely related to underlying   degenerative disease.      JULIO NORTON MD        Assessments & Plan (with Medical Decision Making)   Mr Cortez has been having left sided arm and leg pains for which he saw a neurologist 2 days ago on 2/3/20. He presents today for inability to tolerate MRI ordered by his neurologist secondary to pain. On presentation he was afebrile and hemodynamically within normal limits and stable. He was moderately sedated and calm on exam.  Neuro exam was somewhat limited by patient being partially sedated from valium and likely also from pain in his right and lower extremities.  He was able to lift off the L upper and lower extremities off the bed but was not able to maintain against any force as he reported he was too weak. Reported decreased sensation along the L arm and L leg. Based on last neurology note, neuro exam today appears to be consistent with exam from 2 days ago in neurology clinic and consistent with a C7/L5-S1  "radiculopathy. He was quite sedated already on presentation to the ED and his scans were re-ordered for MR C-spine and MR L-spine. He was given 10 mg oxycodone prior to his scan. MRI was able to be obtained and showed degenerative disease as well as C3-C4 cord compression and L5-S1 severe foraminal stenosis with abutment of the exiting nerve roots.  Neurosurgery was consulted in the ED and came to evaluate the patient and recommendations pending.    I have reviewed the nursing notes.  I have reviewed the findings, diagnosis, plan and need for follow up with the patient.  New Prescriptions    No medications on file       Final diagnoses:   Cervical spinal cord compression (H)   Cervical radiculopathy   Lumbar radiculopathy       2/5/2020   [facility], [place], EMERGENCY DEPARTMENT     Brennan Chaney MD  Resident  02/05/20 1971    Attending addendum:  This data collected with the Resident working in the Emergency Department.  Patient was seen and evaluated by myself and I repeated the history and physical exam with the patient.  The plan of care was discussed with them.  The key portions of the note including the entire assessment and plan reflect my documentation.    Neurosurgery consult was obtained and they called me with their recommendations.  Neurosurgery recommends 8 mg of p.o. Decadron today.  They also advised that the patient continue his prescribed prednisone and do outpatient physical therapy and consider going to a pain clinic for pain control.  They recommended follow-up in neurosurgery clinic in 4 to 6 weeks.    I went to talk to the patient to explain the recommendations from neurosurgery and he became quite hostile.  He asked if neurosurgery had talked to his primary care physician and I explained to him that that is not often possible in a busy emergency department.  Patient was quite offended that I called him an emergency department patient.  He started yelling and said \"get the fuck out of here!\" " "(repeatedly)  Continued yelling at me, would not let me finish explaining what their recommendations were and asked to talk to my superior.  Patient continued to yell and I could not even explaine to him that the process for complaining is through the patient representative. He yelled \"fuck off!\" several more times before I gave up trying to explain the rest of the recommendations from neurosurgery and the process for talking to the patient representative.        Sarah Casillas MD  02/05/20 1518    "

## 2020-02-05 NOTE — ED TRIAGE NOTES
Pt presents with c/o left leg, buttock and arm pain. Reports that he is being evaluated by a neurologist for this. Was supposed to have an MRI or CT scan today but unable to lay flat for imaging d/t pain.

## 2020-02-06 ENCOUNTER — ALLIED HEALTH/NURSE VISIT (OUTPATIENT)
Dept: NEUROLOGY | Facility: CLINIC | Age: 50
End: 2020-02-06

## 2020-02-06 DIAGNOSIS — M54.16 LUMBAR RADICULOPATHY: Primary | ICD-10-CM

## 2020-02-06 NOTE — PROGRESS NOTES
"[facility]:  Care Coordination Note from 2/5/20 Wednesday     SITUATION   Kayden Cortez is a 49 year old male who is receiving support for:  Pain (Recent discharge from ED)  .  Please see note from emergency department. Patient is stating he needs a walker or cane or he will not be able to get up his stairs to his apartment. He stated that the emergency department told him to come here for this equipment    BACKGROUND     Clinic  came to get me as the general neurology nurse was gone for the day. I do not know this patient.    After reading the emergency department note, it was clear that the patient was quite desperate. He was very angry with the physicians in the emergency department. I was concerned he may become upset with me as well.    ASSESSMENT     Neurology          General Assessment   I saw the patient in the 3rd floor lobby. He was seated in a wheel chair and appeared exhausted. He stated he was in the emergency department since 6:30 am and the time I saw him was 4 pm. He spoke in a calm manner but was upset as he stated, \"They said they couldn't help every fly by night patient that shows up at their door.\" He was referring to a staff member at the emergency department.     When I asked him how I could help him. He said he needed an order for a cane or a wheel chair or needed to borrow one. I explained that Dr. Escobar (his neurologist) was not in clinic and he would need Dr. Escobar to write the order.     PLAN        Nursing Interventions:   I went to the lost and found on first floor. I remembered that they donate items if they've been left there for a certain period of time. There were 4 canes and I chose one. I gave the patient this cane. He was very grateful.    Follow-up plan:  I will let Dr. Escobar know about this situation and perhaps general neurology will want to see him back in clinic and do a formal durable medical equipment referral.       Betty Flower, RN    " ----- Message from Sylvia Skelton NP sent at 9/19/2018  5:40 PM CDT -----  Inform that B12, folate, TSH and hemogram are normal. CMP stable, one liver enzyme up a touch,normal range under 38, his left was 44. Recommend eating 3 meals per day, taking snacks. Follow up with me for planned weight check.

## 2020-02-07 ENCOUNTER — COMMUNICATION - HEALTHEAST (OUTPATIENT)
Dept: FAMILY MEDICINE | Facility: CLINIC | Age: 50
End: 2020-02-07

## 2020-02-10 ENCOUNTER — RECORDS - HEALTHEAST (OUTPATIENT)
Dept: ADMINISTRATIVE | Facility: OTHER | Age: 50
End: 2020-02-10

## 2020-02-10 RX ORDER — DIAZEPAM 5 MG
TABLET ORAL
Qty: 2 TABLET | Refills: 0 | OUTPATIENT
Start: 2020-02-10

## 2020-02-11 ENCOUNTER — OFFICE VISIT - HEALTHEAST (OUTPATIENT)
Dept: FAMILY MEDICINE | Facility: CLINIC | Age: 50
End: 2020-02-11

## 2020-02-11 DIAGNOSIS — M48.061 FORAMINAL STENOSIS OF LUMBAR REGION: ICD-10-CM

## 2020-02-11 DIAGNOSIS — F15.11 NONDEPENDENT AMPHETAMINE OR RELATED ACTING SYMPATHOMIMETIC ABUSE, IN REMISSION (H): ICD-10-CM

## 2020-02-11 DIAGNOSIS — M48.061 SPINAL STENOSIS AT L4-L5 LEVEL: ICD-10-CM

## 2020-02-11 DIAGNOSIS — M54.12 CERVICAL RADICULOPATHY: ICD-10-CM

## 2020-02-11 ASSESSMENT — ANXIETY QUESTIONNAIRES
1. FEELING NERVOUS, ANXIOUS, OR ON EDGE: NEARLY EVERY DAY
2. NOT BEING ABLE TO STOP OR CONTROL WORRYING: NEARLY EVERY DAY
3. WORRYING TOO MUCH ABOUT DIFFERENT THINGS: NEARLY EVERY DAY
GAD7 TOTAL SCORE: 21
IF YOU CHECKED OFF ANY PROBLEMS ON THIS QUESTIONNAIRE, HOW DIFFICULT HAVE THESE PROBLEMS MADE IT FOR YOU TO DO YOUR WORK, TAKE CARE OF THINGS AT HOME, OR GET ALONG WITH OTHER PEOPLE: EXTREMELY DIFFICULT
4. TROUBLE RELAXING: NEARLY EVERY DAY
6. BECOMING EASILY ANNOYED OR IRRITABLE: NEARLY EVERY DAY
7. FEELING AFRAID AS IF SOMETHING AWFUL MIGHT HAPPEN: NEARLY EVERY DAY
5. BEING SO RESTLESS THAT IT IS HARD TO SIT STILL: NEARLY EVERY DAY

## 2020-02-11 ASSESSMENT — PATIENT HEALTH QUESTIONNAIRE - PHQ9: SUM OF ALL RESPONSES TO PHQ QUESTIONS 1-9: 27

## 2020-02-12 ENCOUNTER — COMMUNICATION - HEALTHEAST (OUTPATIENT)
Dept: FAMILY MEDICINE | Facility: CLINIC | Age: 50
End: 2020-02-12

## 2020-02-12 DIAGNOSIS — M54.12 CERVICAL RADICULOPATHY: ICD-10-CM

## 2020-02-12 DIAGNOSIS — M48.00 SPINAL STENOSIS, UNSPECIFIED SPINAL REGION: ICD-10-CM

## 2020-02-12 DIAGNOSIS — M48.061 LUMBAR FORAMINAL STENOSIS: ICD-10-CM

## 2020-02-21 ENCOUNTER — OFFICE VISIT (OUTPATIENT)
Dept: NEUROLOGY | Facility: CLINIC | Age: 50
End: 2020-02-21
Payer: COMMERCIAL

## 2020-02-21 ENCOUNTER — RECORDS - HEALTHEAST (OUTPATIENT)
Dept: ADMINISTRATIVE | Facility: OTHER | Age: 50
End: 2020-02-21

## 2020-02-21 VITALS
HEART RATE: 77 BPM | BODY MASS INDEX: 32.6 KG/M2 | OXYGEN SATURATION: 98 % | RESPIRATION RATE: 16 BRPM | DIASTOLIC BLOOD PRESSURE: 78 MMHG | SYSTOLIC BLOOD PRESSURE: 114 MMHG | WEIGHT: 253.9 LBS

## 2020-02-21 DIAGNOSIS — M54.2 NECK PAIN: ICD-10-CM

## 2020-02-21 DIAGNOSIS — M54.12 CERVICAL RADICULOPATHY: ICD-10-CM

## 2020-02-21 DIAGNOSIS — M79.609 PAIN IN EXTREMITY, UNSPECIFIED EXTREMITY: ICD-10-CM

## 2020-02-21 DIAGNOSIS — M54.16 LUMBAR RADICULOPATHY: ICD-10-CM

## 2020-02-21 DIAGNOSIS — M79.609 PAIN IN EXTREMITY, UNSPECIFIED EXTREMITY: Primary | ICD-10-CM

## 2020-02-21 LAB
CRP SERPL-MCNC: <2.9 MG/L (ref 0–8)
ERYTHROCYTE [DISTWIDTH] IN BLOOD BY AUTOMATED COUNT: 14 % (ref 10–15)
ERYTHROCYTE [SEDIMENTATION RATE] IN BLOOD BY WESTERGREN METHOD: 10 MM/H (ref 0–20)
HCT VFR BLD AUTO: 41.2 % (ref 40–53)
HGB BLD-MCNC: 12.8 G/DL (ref 13.3–17.7)
MCH RBC QN AUTO: 29.9 PG (ref 26.5–33)
MCHC RBC AUTO-ENTMCNC: 31.1 G/DL (ref 31.5–36.5)
MCV RBC AUTO: 96 FL (ref 78–100)
PLATELET # BLD AUTO: 309 10E9/L (ref 150–450)
RBC # BLD AUTO: 4.28 10E12/L (ref 4.4–5.9)
WBC # BLD AUTO: 9 10E9/L (ref 4–11)

## 2020-02-21 RX ORDER — GABAPENTIN 300 MG/1
300 CAPSULE ORAL 3 TIMES DAILY
Qty: 90 CAPSULE | Refills: 1 | Status: SHIPPED | OUTPATIENT
Start: 2020-02-21

## 2020-02-21 RX ORDER — PREDNISONE 20 MG/1
TABLET ORAL
Qty: 19 TABLET | Refills: 0 | Status: SHIPPED | OUTPATIENT
Start: 2020-02-21 | End: 2021-07-23

## 2020-02-21 ASSESSMENT — PAIN SCALES - GENERAL: PAINLEVEL: WORST PAIN (10)

## 2020-02-21 NOTE — NURSING NOTE
Chief Complaint   Patient presents with     Follow Up     [facility] RETURN        [facility]     
PEM ATTENDING ADDENDUM  I personally performed a history and physical examination, and discussed the management with the resident/fellow.  The past medical and surgical history, review of systems, family history, social history, current medications, allergies, and immunization status were discussed with the trainee, and I confirmed pertinent portions with the patient and/or famil.  I made modifications above as I felt appropriate; I concur with the history as documented above unless otherwise noted below. My physical exam findings are listed below, which may differ from that documented by the trainee.  I was present for and directly supervised any procedure(s) as documented above.  I personally reviewed the labwork and imaging obtained.  I reviewed the trainee's assessment and plan and made modifications as I felt appropriate.  I agree with the assessment and plan as documented above, unless noted below.    Luis F VALENCIA

## 2020-02-21 NOTE — LETTER
2020       RE: Kayden Cortez  [address] [facility] [address]     Dear Colleague,    Thank you for referring your patient, Kayden Cortez, to the [facility] NEUROLOGY at [facility]. Please see a copy of my visit note below.    Service Date: 2020      Aly Constantino MD   [facility]   [address] [place]  [address]      RE: Kayden Cortez   MRN: [id number]   : 1970      Dear Aly:      I saw Kayden Cortez back.  He is a patient I originally saw on 2020.  At that time, he was experiencing left upper and left lower extremity pain, numbness, as well as neck pain and low back pain.  While his examination was relatively unrevealing.  His symptoms suggested the possibility of a radiculopathy.  In the upper extremities possibly left C7 and in left lower extremity possibly L5 and S1.      He subsequently was seen at the emergency room on 2020 due to increasing pain.  He was seen by Neurosurgery there.      He had cervical and lumbar MRI scans done that I reviewed and discussed with him today.  The cervical study does reveal multilevel degenerative disease.  There is some moderate spinal canal stenosis at C3-C4 with perhaps some mild cord compression but no signal change in the cord.  Otherwise, there really are not any findings that would necessarily explain his left upper extremity radicular symptoms.      A lumbar MRI scan was also done.  I reviewed those images and discussed them with the patient.  He does have multilevel degenerative changes as well as evidence of prior right hemilaminectomy at L4-L5.  A potential finding of note was at the L5-S1 level where he has facet arthropathy, ligamentum flavum thickening producing mild to moderate neural foraminal stenosis bilaterally with abutment of the exiting L5 nerve roots bilaterally.  There is no high-grade central canal stenosis.  There is some mild bilateral neural  foraminal stenosis at L4-5.      He tells me initially the prednisone helped his pain but it has returned.  He continues on gabapentin 300 mg twice a day but is no longer taking tizanidine or pregabalin.      He tells me he has ongoing neck pain, left upper and left lower extremity pain and paresthesias and now some sense of numbness and pain in the right arm.      PHYSICAL EXAMINATION:  Examination today reveals that he appears comfortable sitting, but when I asked him to move to the examination table, he limps and is using a cane.  Pulse 77.  Blood pressure 114/78.      He has good strength in the arms and legs.  His sensory examination does not fit well with a dermatomal pattern.  He reports decreased vibratory sense in the left hand and left foot.  He reports decreased pinprick involving the entire left hand and left foot.  He reports decreased pinprick involving the second, third and fourth digits of the right hand.  His gait is antalgic.  Reflexes are 1+ except for absent ankle jerks.  Plantar responses are flexor.      IMPRESSION:   1.  Neck pain.   2.  Left upper and left lower extremity pain and paresthesias.   3.  Recent development of right upper extremity symptoms.      PLAN:  I did review the cervical and lumbar MRI scans with the patient.  I do not think the changes fully explain all of his symptoms.      He is going to now move forward with electrodiagnostic testing.  He will have EMG and nerve conduction studies of the left upper and left lower extremity which is his more symptomatic side.      He did find the prednisone helpful for a while so I am giving him another 10-day course of prednisone.      He is going to increase his gabapentin to 300 mg 3 times a day.      I am going to check a CBC and sedimentation rate today. (ESR and CRP normal, Hb 12.8, CBC otherwise normal)     I will be seeing him back after he has had the EMG study.      Sincerely,      Michele Escobar MD      Total visit time 25  minutes.  More than 50% of this time was spent in counseling and coordination of care.         LUDIVINA MARSH MD             D: 2020   T: 2020   MT: be      Name:     DHAVAL OWEN   MRN:      [id number]        Account:      [id number]   :      1970           Service Date: 2020      Document: [id number]

## 2020-02-21 NOTE — PROGRESS NOTES
Service Date: 2020      Aly Constantino MD   [facility]   [address] [place]  [address]      RE: Kayden Cortez   MRN: [id number]   : 1970      Dear Aly:      I saw Kayden Cortez back.  He is a patient I originally saw on 2020.  At that time, he was experiencing left upper and left lower extremity pain, numbness, as well as neck pain and low back pain.  While his examination was relatively unrevealing.  His symptoms suggested the possibility of a radiculopathy.  In the upper extremities possibly left C7 and in left lower extremity possibly L5 and S1.      He subsequently was seen at the emergency room on 2020 due to increasing pain.  He was seen by Neurosurgery there.      He had cervical and lumbar MRI scans done that I reviewed and discussed with him today.  The cervical study does reveal multilevel degenerative disease.  There is some moderate spinal canal stenosis at C3-C4 with perhaps some mild cord compression but no signal change in the cord.  Otherwise, there really are not any findings that would necessarily explain his left upper extremity radicular symptoms.      A lumbar MRI scan was also done.  I reviewed those images and discussed them with the patient.  He does have multilevel degenerative changes as well as evidence of prior right hemilaminectomy at L4-L5.  A potential finding of note was at the L5-S1 level where he has facet arthropathy, ligamentum flavum thickening producing mild to moderate neural foraminal stenosis bilaterally with abutment of the exiting L5 nerve roots bilaterally.  There is no high-grade central canal stenosis.  There is some mild bilateral neural foraminal stenosis at L4-5.      He tells me initially the prednisone helped his pain but it has returned.  He continues on gabapentin 300 mg twice a day but is no longer taking tizanidine or pregabalin.      He tells me he has ongoing neck pain, left upper and left lower  extremity pain and paresthesias and now some sense of numbness and pain in the right arm.      PHYSICAL EXAMINATION:  Examination today reveals that he appears comfortable sitting, but when I asked him to move to the examination table, he limps and is using a cane.  Pulse 77.  Blood pressure 114/78.      He has good strength in the arms and legs.  His sensory examination does not fit well with a dermatomal pattern.  He reports decreased vibratory sense in the left hand and left foot.  He reports decreased pinprick involving the entire left hand and left foot.  He reports decreased pinprick involving the second, third and fourth digits of the right hand.  His gait is antalgic.  Reflexes are 1+ except for absent ankle jerks.  Plantar responses are flexor.      IMPRESSION:   1.  Neck pain.   2.  Left upper and left lower extremity pain and paresthesias.   3.  Recent development of right upper extremity symptoms.      PLAN:  I did review the cervical and lumbar MRI scans with the patient.  I do not think the changes fully explain all of his symptoms.      He is going to now move forward with electrodiagnostic testing.  He will have EMG and nerve conduction studies of the left upper and left lower extremity which is his more symptomatic side.      He did find the prednisone helpful for a while so I am giving him another 10-day course of prednisone.      He is going to increase his gabapentin to 300 mg 3 times a day.      I am going to check a CBC and sedimentation rate today. (ESR and CRP normal, Hb 12.8, CBC otherwise normal)     I will be seeing him back after he has had the EMG study.      Sincerely,      Michele Escobar MD      Total visit time 25 minutes.  More than 50% of this time was spent in counseling and coordination of care.         MICHELE ESCOBAR MD             D: 2020   T: 2020   MT: be      Name:     DHAVAL OWEN   MRN:      [id number]        Account:      [id number]   :       1970           Service Date: 02/21/2020      Document: [id number]

## 2020-03-11 ENCOUNTER — RECORDS - HEALTHEAST (OUTPATIENT)
Dept: ADMINISTRATIVE | Facility: OTHER | Age: 50
End: 2020-03-11

## 2020-03-12 ENCOUNTER — RECORDS - HEALTHEAST (OUTPATIENT)
Dept: ADMINISTRATIVE | Facility: OTHER | Age: 50
End: 2020-03-12

## 2020-04-23 ENCOUNTER — RECORDS - HEALTHEAST (OUTPATIENT)
Dept: ADMINISTRATIVE | Facility: OTHER | Age: 50
End: 2020-04-23

## 2020-07-18 ENCOUNTER — COMMUNICATION - HEALTHEAST (OUTPATIENT)
Dept: FAMILY MEDICINE | Facility: CLINIC | Age: 50
End: 2020-07-18

## 2020-07-18 DIAGNOSIS — K22.70 BARRETT'S ESOPHAGUS: ICD-10-CM

## 2020-10-08 ENCOUNTER — RECORDS - HEALTHEAST (OUTPATIENT)
Dept: ADMINISTRATIVE | Facility: OTHER | Age: 50
End: 2020-10-08

## 2020-10-16 ENCOUNTER — COMMUNICATION - HEALTHEAST (OUTPATIENT)
Dept: FAMILY MEDICINE | Facility: CLINIC | Age: 50
End: 2020-10-16

## 2020-10-16 DIAGNOSIS — I25.10 CORONARY ATHEROSCLEROSIS: ICD-10-CM

## 2020-11-02 ENCOUNTER — OFFICE VISIT - HEALTHEAST (OUTPATIENT)
Dept: FAMILY MEDICINE | Facility: CLINIC | Age: 50
End: 2020-11-02

## 2020-11-02 DIAGNOSIS — Z00.00 HEALTH CARE MAINTENANCE: ICD-10-CM

## 2020-11-02 DIAGNOSIS — M54.50 LOWER BACK PAIN: ICD-10-CM

## 2020-11-02 DIAGNOSIS — K22.70 BARRETT'S ESOPHAGUS: ICD-10-CM

## 2020-11-02 DIAGNOSIS — R13.10 DYSPHAGIA, UNSPECIFIED TYPE: ICD-10-CM

## 2020-11-02 DIAGNOSIS — E78.00 PURE HYPERCHOLESTEROLEMIA: ICD-10-CM

## 2020-11-02 DIAGNOSIS — J45.20 ASTHMA, MILD INTERMITTENT: ICD-10-CM

## 2020-11-02 DIAGNOSIS — I25.10 CORONARY ATHEROSCLEROSIS: ICD-10-CM

## 2020-11-02 DIAGNOSIS — Z23 NEED FOR TETANUS BOOSTER: ICD-10-CM

## 2020-11-02 DIAGNOSIS — Z23 NEED FOR INFLUENZA VACCINATION: ICD-10-CM

## 2020-11-02 DIAGNOSIS — G47.00 INSOMNIA: ICD-10-CM

## 2020-11-02 DIAGNOSIS — K21.9 GASTROESOPHAGEAL REFLUX DISEASE WITHOUT ESOPHAGITIS: ICD-10-CM

## 2020-11-02 DIAGNOSIS — F30.9 BIPOLAR I DISORDER, SINGLE MANIC EPISODE (H): ICD-10-CM

## 2020-11-02 DIAGNOSIS — Z12.5 SCREENING PSA (PROSTATE SPECIFIC ANTIGEN): ICD-10-CM

## 2020-11-02 ASSESSMENT — PATIENT HEALTH QUESTIONNAIRE - PHQ9: SUM OF ALL RESPONSES TO PHQ QUESTIONS 1-9: 17

## 2020-11-02 ASSESSMENT — MIFFLIN-ST. JEOR: SCORE: 2000.34

## 2020-11-09 ENCOUNTER — AMBULATORY - HEALTHEAST (OUTPATIENT)
Dept: LAB | Facility: CLINIC | Age: 50
End: 2020-11-09

## 2020-11-09 DIAGNOSIS — R13.10 SWALLOWING DIFFICULTY: ICD-10-CM

## 2021-02-22 ENCOUNTER — RECORDS - HEALTHEAST (OUTPATIENT)
Dept: ADMINISTRATIVE | Facility: OTHER | Age: 51
End: 2021-02-22

## 2021-05-18 ENCOUNTER — COMMUNICATION - HEALTHEAST (OUTPATIENT)
Dept: FAMILY MEDICINE | Facility: CLINIC | Age: 51
End: 2021-05-18

## 2021-05-18 DIAGNOSIS — I25.10 CORONARY ATHEROSCLEROSIS: ICD-10-CM

## 2021-05-18 DIAGNOSIS — E55.9 VITAMIN D DEFICIENCY: ICD-10-CM

## 2021-05-18 DIAGNOSIS — Z00.00 HEALTH CARE MAINTENANCE: ICD-10-CM

## 2021-05-18 DIAGNOSIS — K22.70 BARRETT'S ESOPHAGUS: ICD-10-CM

## 2021-05-18 DIAGNOSIS — J45.20 ASTHMA, MILD INTERMITTENT: ICD-10-CM

## 2021-05-18 DIAGNOSIS — E78.00 PURE HYPERCHOLESTEROLEMIA: ICD-10-CM

## 2021-05-18 DIAGNOSIS — G47.00 INSOMNIA: ICD-10-CM

## 2021-05-18 DIAGNOSIS — M54.50 LOWER BACK PAIN: ICD-10-CM

## 2021-05-18 DIAGNOSIS — F30.9 BIPOLAR I DISORDER, SINGLE MANIC EPISODE (H): ICD-10-CM

## 2021-05-24 ENCOUNTER — RECORDS - HEALTHEAST (OUTPATIENT)
Dept: ADMINISTRATIVE | Facility: CLINIC | Age: 51
End: 2021-05-24

## 2021-05-25 ENCOUNTER — RECORDS - HEALTHEAST (OUTPATIENT)
Dept: ADMINISTRATIVE | Facility: CLINIC | Age: 51
End: 2021-05-25

## 2021-05-27 ENCOUNTER — RECORDS - HEALTHEAST (OUTPATIENT)
Dept: ADMINISTRATIVE | Facility: CLINIC | Age: 51
End: 2021-05-27

## 2021-05-27 ASSESSMENT — PATIENT HEALTH QUESTIONNAIRE - PHQ9
SUM OF ALL RESPONSES TO PHQ QUESTIONS 1-9: 17
SUM OF ALL RESPONSES TO PHQ QUESTIONS 1-9: 27

## 2021-05-27 NOTE — TELEPHONE ENCOUNTER
"LMTCB on home number listed as mobile number just says \"unable to receive calls at this time\" and hangs up. When pt calls back please relay MD message below and help pt schedule appt   "

## 2021-05-27 NOTE — TELEPHONE ENCOUNTER
Dr Constantino- were you going to send in the refills he requested? Or does he need to be seen for those as well?

## 2021-05-27 NOTE — TELEPHONE ENCOUNTER
Name of form/paperwork: Other:  Disability   Have you been seen for this request: No  Do we have the form: Yes  When is form needed by: Next couple days  How would you like the form returned: Fax to [employer] listed on form  Fax Number: Call [employer] for fax number  Patient Notified form requests are processed in 3-5 business days: Yes  (If patient needs form sooner, please note that in this message.)  Okay to leave a detailed message? No

## 2021-05-27 NOTE — TELEPHONE ENCOUNTER
Attempted to reach pt again. Mobile number out of service and home number is an incorrect number so that one was removed. I did leave a message with pts Mother who is emergency contact asking her to have him contact us or to call us back with a better contact number for him.

## 2021-05-27 NOTE — TELEPHONE ENCOUNTER
Was able to get a hold of mother Mirian and get an alternate number. I spoke to Kayden and got him scheduled with Dr Constantino on 4/18 to fill out his paperwork

## 2021-05-28 ENCOUNTER — RECORDS - HEALTHEAST (OUTPATIENT)
Dept: ADMINISTRATIVE | Facility: CLINIC | Age: 51
End: 2021-05-28

## 2021-05-28 ASSESSMENT — ANXIETY QUESTIONNAIRES: GAD7 TOTAL SCORE: 21

## 2021-05-28 ASSESSMENT — ASTHMA QUESTIONNAIRES
ACT_TOTALSCORE: 16
ACT_TOTALSCORE: 12

## 2021-05-29 NOTE — TELEPHONE ENCOUNTER
New Appointment Needed  What is the reason for the visit:  Paperwork for work  Same Date/Next Day Appt Request  What is the reason for your visit?: patient states that he needs to see PCP tomorrow as his papers are due tomorrow. He is looking to get papers to take a leave from work for 45 days    Provider Preference: PCP only  How soon do you need to be seen?: tomorrow  Waitlist offered?: No  Okay to leave a detailed message:  Yes

## 2021-05-29 NOTE — TELEPHONE ENCOUNTER
14 RX refill requests rec'd. Last ordered 10/30/2018 with refills. Contacted pharmacy. Pharmacist states they have refills ready to  for this patient and to ignore the requests we rec'd on 6/13/2019.(all medications on this patient's list were requested).     Cornelia Dueñas RN [facility] Triage Nurse

## 2021-05-29 NOTE — PATIENT INSTRUCTIONS - HE
medical opinion form completed    Quit smoking    Refer to Orthopedics for shoulder pain.    Referral to the pain clinic

## 2021-05-29 NOTE — TELEPHONE ENCOUNTER
Recommend ibuprofen 600 mg up to three times a day and may alternate with tylenol 500 mg 2-3 times daily.  Prescription pain killers not an option due to past chemical substance abuse

## 2021-05-29 NOTE — PROGRESS NOTES
DIAGNOSIS:  1. PTSD (post-traumatic stress disorder)     2. Bipolar Disorder     3. Nicotine Dependence     4. Acute pain of left shoulder  Ambulatory referral to Orthopedic Surgery   5. Chronic low back pain with bilateral sciatica, unspecified back pain laterality  Ambulatory referral to Pain Clinic       PLAN:  Patient Instructions   medical opinion form completed    Quit smoking    Refer to Orthopedics for shoulder pain.    Referral to the pain clinic            HPI:  In the process of being placed into a housing situation.  Renting a basement currently.    Sober almost a year.  Chronic LBP and will be going to the pain clinic next week.  1/2 PPD cigs.  The girl she is dating is a meth user.  No chest pain.  No shortness of breath.  Left shoulder hurting for two weeks.  Seems to be getting worse.  Hurts day and night.  Constant, throbbing, sharp pain.  Up to 8-10/10.  At 5-6/10 all day.          Current Outpatient Medications on File Prior to Visit   Medication Sig Dispense Refill     albuterol (PROAIR HFA;PROVENTIL HFA;VENTOLIN HFA) 90 mcg/actuation inhaler Inhale 2 puffs every 4 (four) hours as needed for wheezing. 1 Inhaler 2     aspirin 325 MG tablet Take 325 mg by mouth daily.       atenolol (TENORMIN) 25 MG tablet TAKE ONE TABLET BY MOUTH ONE TIME DAILY IN THE EVENING. 90 tablet 3     cholecalciferol, vitamin D3, (VITAMIN D3) 2,000 unit capsule one capsule daily 90 capsule 3     dicyclomine (BENTYL) 20 mg tablet Take 1 tablet (20 mg total) by mouth 2 (two) times a day. 90 tablet 3     folic acid (FOLVITE) 400 MCG tablet Take 2 tablets (800 mcg total) by mouth daily. 90 tablet 3     gabapentin (NEURONTIN) 300 MG capsule Take 1 capsule (300 mg total) by mouth 2 (two) times a day. 180 capsule 3     hydrOXYzine HCl (ATARAX) 50 MG tablet Take 1 tablet (50 mg total) by mouth at bedtime. 90 tablet 3     LATUDA 80 mg Tab tablet Take 1 tablet (80 mg total) by mouth at bedtime. 90 tablet 3     mometasone-formoterol  (DULERA) 100-5 mcg/actuation HFAA inhaler Inhale 2 puffs 2 (two) times a day. 1 Inhaler 5     nitroglycerin (NITROSTAT) 0.4 MG SL tablet Place 1 tablet (0.4 mg total) under the tongue every 5 (five) minutes as needed for chest pain. 30 tablet 0     omeprazole (PRILOSEC) 40 MG capsule Take 1 capsule (40 mg total) by mouth at bedtime. 90 capsule 3     pediatric multivitamin (FLINTSTONES) Chew chewable tablet Chew.       simvastatin (ZOCOR) 40 MG tablet Take 1 tablet (40 mg total) by mouth at bedtime. 90 tablet 3     No current facility-administered medications on file prior to visit.        Pmh: reviewed  Psh: reviewed  Allergy:  reviewed      EXAM:    There were no vitals taken for this visit.  GEN:   ALERT, NAD, ORIENTED TIMES THREE  NECK: SUPPLE WITHOUT ADENOPATHY OR THYROMEGALY  LUNGS: CTA  COR: RRR WITHOUT MURMUR  SKIN: NO RASH , ULCERS OR LESIONS NOTED  MS:  Left shoulder with incomplete overhead reach.  No focal tx.  Painful to reach behind head.  EXT: WITHOUT EDEMA/SWELLING    No results found for this or any previous visit (from the past 168 hour(s)).

## 2021-05-29 NOTE — TELEPHONE ENCOUNTER
Reason contacted:  Appointment  Information relayed:  Informed patient Dr. Constantino does not have any openings available tomorrow.  Scheduled patient with Frida Jessica tomorrow 06/12/19 at 11:20 AM.  Pt voiced understanding.  Additional questions:  No  Further follow-up needed:  Yes

## 2021-05-29 NOTE — TELEPHONE ENCOUNTER
Question following Office Visit  When did you see your provider: today  What is your question: I was referred to the pain clinic today but they can not get me in until end of July or early August and I need something for my neck and shoulder pain.   [facility] on [address]  Okay to leave a detailed message: Yes

## 2021-05-29 NOTE — TELEPHONE ENCOUNTER
FYI - Status Update  Who is Calling: Patient  Update: Patient was informed of Aly Constantino MD message below. Patient verbalized understanding and had no further questions.  Okay to leave a detailed message?:  No return call needed

## 2021-05-30 NOTE — TELEPHONE ENCOUNTER
RN cannot approve Refill Request    RN can NOT refill this medication med is not covered by policy/route to provider     . Last office visit: 6/12/2019 Aly Constantino MD Last Physical: Visit date not found Last MTM visit: Visit date not found Last visit same specialty: 6/12/2019 Aly Constantino MD.  Next visit within 3 mo: Visit date not found  Next physical within 3 mo: Visit date not found      Betty Olivera, [facility] Triage/Med Refill 6/25/2019    Requested Prescriptions   Pending Prescriptions Disp Refills     nitroglycerin (NITROSTAT) 0.4 MG SL tablet [Pharmacy Med Name: NITROGLYCERIN 0.4MG SUB TAB 25] 25 tablet 0     Sig: ONE TABLET UNDER TONGUE AS NEEDED FOR CHEST PAIN( TAKE EVERY 5 MINUTES IF NEEDED, MAX 3 TABLETS)       There is no refill protocol information for this order

## 2021-05-31 VITALS — HEIGHT: 72 IN | BODY MASS INDEX: 32.56 KG/M2 | WEIGHT: 240.4 LBS

## 2021-05-31 NOTE — TELEPHONE ENCOUNTER
Let pt know his insurance will only cover one capsule daily.  We can change to a 40 mg capsule if he would like to try that.

## 2021-05-31 NOTE — TELEPHONE ENCOUNTER
Central PA team  [phone]  Pool: HE PA MED ([id number])          PA has been initiated.       PA form completed and faxed insurance via Cover My Meds     Key:  [id number]     Medication:  Omeprazole 40    Insurance:  [employer] [place]        Response will be received via fax and may take up to 5-10 business days depending on plan

## 2021-05-31 NOTE — TELEPHONE ENCOUNTER
Fax received from [facility] pharmacy requesting Prior Authorization    Medication Name:   omeprazole (PRILOSEC) 40 MG capsule 90 capsule 3 10/30/2018     Sig - Route: Take 1 capsule (40 mg total) by mouth at bedtime. - Oral    Sent to pharmacy as: omeprazole (PRILOSEC) 40 MG capsule    E-Prescribing Status: Receipt confirmed by pharmacy (10/30/2018 11:08 AM CDT)          Insurance Plan: MILLY   PBM:    Patient ID Number: [id number]    Please start PA process

## 2021-05-31 NOTE — TELEPHONE ENCOUNTER
Left message to call back for: Medication change  Information to relay to patient:  MD message below. Please document if pt would like to try the 40mg capsule

## 2021-05-31 NOTE — TELEPHONE ENCOUNTER
2ND ATTEMPT    Left message to call back for: Prescription  Information to relay to patient:  MD's question below

## 2021-05-31 NOTE — TELEPHONE ENCOUNTER
Left message to call back for: Medication change  Information to relay to patient:  MD message below. Please document if pt would like to try the 40mg capsule    Third attempt

## 2021-06-01 ENCOUNTER — RECORDS - HEALTHEAST (OUTPATIENT)
Dept: FAMILY MEDICINE | Facility: CLINIC | Age: 51
End: 2021-06-01

## 2021-06-02 ENCOUNTER — RECORDS - HEALTHEAST (OUTPATIENT)
Dept: ADMINISTRATIVE | Facility: CLINIC | Age: 51
End: 2021-06-02

## 2021-06-02 VITALS — BODY MASS INDEX: 29.84 KG/M2 | WEIGHT: 232.4 LBS

## 2021-06-03 VITALS — BODY MASS INDEX: 30.93 KG/M2 | WEIGHT: 241 LBS | HEIGHT: 74 IN

## 2021-06-04 VITALS
BODY MASS INDEX: 31.38 KG/M2 | TEMPERATURE: 97.4 F | HEART RATE: 85 BPM | WEIGHT: 244.4 LBS | DIASTOLIC BLOOD PRESSURE: 74 MMHG | RESPIRATION RATE: 16 BRPM | SYSTOLIC BLOOD PRESSURE: 120 MMHG

## 2021-06-05 VITALS
SYSTOLIC BLOOD PRESSURE: 113 MMHG | HEIGHT: 72 IN | TEMPERATURE: 96.9 F | HEART RATE: 101 BPM | RESPIRATION RATE: 20 BRPM | DIASTOLIC BLOOD PRESSURE: 84 MMHG | WEIGHT: 245 LBS | BODY MASS INDEX: 33.18 KG/M2

## 2021-06-05 NOTE — TELEPHONE ENCOUNTER
Patient is not inpt at a treatment center.  The medications he had he states the last facility destroyed.  Please not [facility] is [facility] and cannot be left messages at the facility but you can ask for    Refill Request  Did you contact pharmacy: No  Medication name:   Requested Prescriptions     Pending Prescriptions Disp Refills     albuterol (PROAIR HFA;PROVENTIL HFA;VENTOLIN HFA) 90 mcg/actuation inhaler 1 Inhaler 2     Sig: Inhale 2 puffs every 4 (four) hours as needed for wheezing.     atenoloL (TENORMIN) 25 MG tablet 90 tablet 3     Sig: TAKE ONE TABLET BY MOUTH ONE TIME DAILY IN THE EVENING.     cholecalciferol, vitamin D3, (VITAMIN D3) 50 mcg (2,000 unit) capsule 90 capsule 3     Sig: one capsule daily     gabapentin (NEURONTIN) 300 MG capsule 180 capsule 3     Sig: Take 1 capsule (300 mg total) by mouth 2 (two) times a day.     hydrOXYzine HCl (ATARAX) 50 MG tablet 90 tablet 3     Sig: Take 1 tablet (50 mg total) by mouth at bedtime.     LATUDA 80 mg Tab tablet 90 tablet 3     Sig: Take 1 tablet (80 mg total) by mouth at bedtime.     mometasone-formoterol (DULERA) 100-5 mcg/actuation HFAA inhaler 1 Inhaler 5     Sig: Inhale 2 puffs 2 (two) times a day.     nitroglycerin (NITROSTAT) 0.4 MG SL tablet 25 tablet 6     omeprazole (PRILOSEC) 40 MG capsule 90 capsule 3     Sig: Take 1 capsule (40 mg total) by mouth at bedtime.     simvastatin (ZOCOR) 40 MG tablet 90 tablet 3     Sig: Take 1 tablet (40 mg total) by mouth at bedtime.     Who prescribed the medication: Aly Constantino MD  [facility]  Is patient out of medication: Yes  Patient notified refills processed in 3 business days:  no  Okay to leave a detailed message: no

## 2021-06-05 NOTE — TELEPHONE ENCOUNTER
Attempted to call but no answer and got voicemail.  Message states to not leave a message.  No message left.  Try calling again later.

## 2021-06-05 NOTE — TELEPHONE ENCOUNTER
Attempted to call pt but mailbox is full so unable to leave message. If pt calls back please ask MD questions below. Otherwise it looks like pt has appt with Dr Constantino tomorrow and we can address then.

## 2021-06-05 NOTE — TELEPHONE ENCOUNTER
Please ask pt who he has been asked to follow up with since being seen in the ER and by Neurosurgery.

## 2021-06-05 NOTE — TELEPHONE ENCOUNTER
RN triage   Call from pt   Pt states he has recurring cyst on lower spine  -- has been shaved off in the past  Cyst returned about 2 months ago -- very bad pain for the past 10 days   No difficult to button shirt and pants -- and effecting him mentally -- states he has trouble focus --   Now pain radiates to R ankle and foot --   Rates back pain 12+/10 and also having abd pain - rates 12+/10   Per protocol = should go to ED   Pt to go to [facility]   Judie Arroyo RN [facility] RN triage    Reason for Disposition    SEVERE abdominal pain (e.g., excruciating)    Protocols used: BACK PAIN-A-OH

## 2021-06-05 NOTE — TELEPHONE ENCOUNTER
Attempted to call pt but there was no answer and message below states to not leave message. Please relay MD message if pt calls back.

## 2021-06-05 NOTE — TELEPHONE ENCOUNTER
Pharmacy informed that Dr. Constantino does not manage the [facility], pharmacy needs to contact Nath[facility] psychiatrist.  Pharmacy will contact Kayden and get psychiatrist information.

## 2021-06-05 NOTE — TELEPHONE ENCOUNTER
Referral Request  Type of referral: orthopedics  Who s requesting: Patient  Why the request: patient has pinched nerves in neck and back- was seen in Neurology and had CT scans at [facility]   Have you been seen for this request: Yes  Does patient have a preference on a group/provider? Prefers [place] or wherever Dr. Constantino recommends  Okay to leave a detailed message?  Yes

## 2021-06-05 NOTE — TELEPHONE ENCOUNTER
Medication Request  Medication name:  mg daily  [facility]  Reason for request: Patient had all his medications thrown out he states by a facility.  Now he is in a new facility asking for refills. This order is historic . Patient did verify he is taking it.  Please review.   When did you use medication last?:  unsure  Patient offered appointment:  Patient is in a treatment facility.    Okay to leave a detailed message: no

## 2021-06-05 NOTE — TELEPHONE ENCOUNTER
Refill Approved    Rx renewed per Medication Renewal Policy. Medication was last renewed on 10/30/18.    Betty Olivera, [facility] Triage/Med Refill 1/29/2020     Requested Prescriptions   Pending Prescriptions Disp Refills     albuterol (PROAIR HFA;PROVENTIL HFA;VENTOLIN HFA) 90 mcg/actuation inhaler 1 Inhaler 2     Sig: Inhale 2 puffs every 4 (four) hours as needed for wheezing.       Albuterol/Levalbuterol Refill Protocol Passed - 1/29/2020  2:30 PM        Passed - PCP or prescribing provider visit in last year     Last office visit with prescriber/PCP: 6/12/2019 Aly Constantino MD OR same dept: 6/12/2019 Aly Constantino MD OR same specialty: 6/12/2019 Aly Constantino MD Last physical: Visit date not found       Next appt within 3 mo: Visit date not found  Next physical within 3 mo: Visit date not found  Prescriber OR PCP: Aly Constantino MD  Last diagnosis associated with med order: 1. Asthma, mild intermittent  - albuterol (PROAIR HFA;PROVENTIL HFA;VENTOLIN HFA) 90 mcg/actuation inhaler; Inhale 2 puffs every 4 (four) hours as needed for wheezing.  Dispense: 1 Inhaler; Refill: 2  - mometasone-formoterol (DULERA) 100-5 mcg/actuation HFAA inhaler; Inhale 2 puffs 2 (two) times a day.  Dispense: 1 Inhaler; Refill: 5    2. Coronary atherosclerosis  - atenoloL (TENORMIN) 25 MG tablet; TAKE ONE TABLET BY MOUTH ONE TIME DAILY IN THE EVENING.  Dispense: 90 tablet; Refill: 3  - nitroglycerin (NITROSTAT) 0.4 MG SL tablet  Dispense: 25 tablet; Refill: 6    3. Vitamin D deficiency  - cholecalciferol, vitamin D3, (VITAMIN D3) 50 mcg (2,000 unit) capsule; one capsule daily  Dispense: 90 capsule; Refill: 3    4. Lower back pain  - gabapentin (NEURONTIN) 300 MG capsule; Take 1 capsule (300 mg total) by mouth 2 (two) times a day.  Dispense: 180 capsule; Refill: 3    5. Insomnia  - hydrOXYzine HCl (ATARAX) 50 MG tablet; Take 1 tablet (50 mg total) by mouth at bedtime.  Dispense: 90 tablet;  Refill: 3    6. Calderon's esophagus  - omeprazole (PRILOSEC) 40 MG capsule; Take 1 capsule (40 mg total) by mouth at bedtime.  Dispense: 90 capsule; Refill: 3    7. Hypercholesterolemia  - simvastatin (ZOCOR) 40 MG tablet; Take 1 tablet (40 mg total) by mouth at bedtime.  Dispense: 90 tablet; Refill: 3    If protocol passes may refill for 6 months if within 3 months of last provider visit (or a total of 9 months). If patient requesting >1 inhaler per month refill x 6 months and have patient make appointment with provider.          atenoloL (TENORMIN) 25 MG tablet 90 tablet 3     Sig: TAKE ONE TABLET BY MOUTH ONE TIME DAILY IN THE EVENING.       Beta-Blockers Refill Protocol Passed - 1/29/2020  2:30 PM        Passed - PCP or prescribing provider visit in past 12 months or next 3 months     Last office visit with prescriber/PCP: 6/12/2019 Aly Constantino MD OR same dept: 6/12/2019 Aly Constantino MD OR same specialty: 6/12/2019 Aly Constantino MD  Last physical: Visit date not found Last MTM visit: Visit date not found   Next visit within 3 mo: Visit date not found  Next physical within 3 mo: Visit date not found  Prescriber OR PCP: Aly Constantino MD  Last diagnosis associated with med order: 1. Asthma, mild intermittent  - albuterol (PROAIR HFA;PROVENTIL HFA;VENTOLIN HFA) 90 mcg/actuation inhaler; Inhale 2 puffs every 4 (four) hours as needed for wheezing.  Dispense: 1 Inhaler; Refill: 2  - mometasone-formoterol (DULERA) 100-5 mcg/actuation HFAA inhaler; Inhale 2 puffs 2 (two) times a day.  Dispense: 1 Inhaler; Refill: 5    2. Coronary atherosclerosis  - atenoloL (TENORMIN) 25 MG tablet; TAKE ONE TABLET BY MOUTH ONE TIME DAILY IN THE EVENING.  Dispense: 90 tablet; Refill: 3  - nitroglycerin (NITROSTAT) 0.4 MG SL tablet  Dispense: 25 tablet; Refill: 6    3. Vitamin D deficiency  - cholecalciferol, vitamin D3, (VITAMIN D3) 50 mcg (2,000 unit) capsule; one capsule daily  Dispense: 90  capsule; Refill: 3    4. Lower back pain  - gabapentin (NEURONTIN) 300 MG capsule; Take 1 capsule (300 mg total) by mouth 2 (two) times a day.  Dispense: 180 capsule; Refill: 3    5. Insomnia  - hydrOXYzine HCl (ATARAX) 50 MG tablet; Take 1 tablet (50 mg total) by mouth at bedtime.  Dispense: 90 tablet; Refill: 3    6. Calderon's esophagus  - omeprazole (PRILOSEC) 40 MG capsule; Take 1 capsule (40 mg total) by mouth at bedtime.  Dispense: 90 capsule; Refill: 3    7. Hypercholesterolemia  - simvastatin (ZOCOR) 40 MG tablet; Take 1 tablet (40 mg total) by mouth at bedtime.  Dispense: 90 tablet; Refill: 3    If protocol passes may refill for 12 months if within 3 months of last provider visit (or a total of 15 months).             Passed - Blood pressure filed in past 12 months     BP Readings from Last 1 Encounters:   08/03/19 125/77             cholecalciferol, vitamin D3, (VITAMIN D3) 50 mcg (2,000 unit) capsule 90 capsule 3     Sig: one capsule daily       There is no refill protocol information for this order        gabapentin (NEURONTIN) 300 MG capsule 180 capsule 3     Sig: Take 1 capsule (300 mg total) by mouth 2 (two) times a day.       Gabapentin/Levetiracetam/Tiagabine Refill Protocol  Passed - 1/29/2020  2:30 PM        Passed - PCP or prescribing provider visit in past 12 months or next 3 months     Last office visit with prescriber/PCP: 6/12/2019 Aly Constantino MD OR same dept: 6/12/2019 Aly Constantino MD OR same specialty: 6/12/2019 Aly Constantino MD  Last physical: Visit date not found Last MTM visit: Visit date not found   Next visit within 3 mo: Visit date not found  Next physical within 3 mo: Visit date not found  Prescriber OR PCP: Aly Constantino MD  Last diagnosis associated with med order: 1. Asthma, mild intermittent  - albuterol (PROAIR HFA;PROVENTIL HFA;VENTOLIN HFA) 90 mcg/actuation inhaler; Inhale 2 puffs every 4 (four) hours as needed for wheezing.  Dispense:  1 Inhaler; Refill: 2  - mometasone-formoterol (DULERA) 100-5 mcg/actuation HFAA inhaler; Inhale 2 puffs 2 (two) times a day.  Dispense: 1 Inhaler; Refill: 5    2. Coronary atherosclerosis  - atenoloL (TENORMIN) 25 MG tablet; TAKE ONE TABLET BY MOUTH ONE TIME DAILY IN THE EVENING.  Dispense: 90 tablet; Refill: 3  - nitroglycerin (NITROSTAT) 0.4 MG SL tablet  Dispense: 25 tablet; Refill: 6    3. Vitamin D deficiency  - cholecalciferol, vitamin D3, (VITAMIN D3) 50 mcg (2,000 unit) capsule; one capsule daily  Dispense: 90 capsule; Refill: 3    4. Lower back pain  - gabapentin (NEURONTIN) 300 MG capsule; Take 1 capsule (300 mg total) by mouth 2 (two) times a day.  Dispense: 180 capsule; Refill: 3    5. Insomnia  - hydrOXYzine HCl (ATARAX) 50 MG tablet; Take 1 tablet (50 mg total) by mouth at bedtime.  Dispense: 90 tablet; Refill: 3    6. Calderon's esophagus  - omeprazole (PRILOSEC) 40 MG capsule; Take 1 capsule (40 mg total) by mouth at bedtime.  Dispense: 90 capsule; Refill: 3    7. Hypercholesterolemia  - simvastatin (ZOCOR) 40 MG tablet; Take 1 tablet (40 mg total) by mouth at bedtime.  Dispense: 90 tablet; Refill: 3    If protocol passes may refill for 12 months if within 3 months of last provider visit (or a total of 15 months).             hydrOXYzine HCl (ATARAX) 50 MG tablet 90 tablet 3     Sig: Take 1 tablet (50 mg total) by mouth at bedtime.       Antihistamine Refill Protocol Passed - 1/29/2020  2:30 PM        Passed - Patient has had office visit/physical in last year     Last office visit with prescriber/PCP: 6/12/2019 Aly Constantino MD OR same dept: 6/12/2019 Aly Constantino MD OR same specialty: 6/12/2019 Aly Constantino MD  Last physical: Visit date not found Last MTM visit: Visit date not found   Next visit within 3 mo: Visit date not found  Next physical within 3 mo: Visit date not found  Prescriber OR PCP: Aly Constantino MD  Last diagnosis associated with med order: 1.  Asthma, mild intermittent  - albuterol (PROAIR HFA;PROVENTIL HFA;VENTOLIN HFA) 90 mcg/actuation inhaler; Inhale 2 puffs every 4 (four) hours as needed for wheezing.  Dispense: 1 Inhaler; Refill: 2  - mometasone-formoterol (DULERA) 100-5 mcg/actuation HFAA inhaler; Inhale 2 puffs 2 (two) times a day.  Dispense: 1 Inhaler; Refill: 5    2. Coronary atherosclerosis  - atenoloL (TENORMIN) 25 MG tablet; TAKE ONE TABLET BY MOUTH ONE TIME DAILY IN THE EVENING.  Dispense: 90 tablet; Refill: 3  - nitroglycerin (NITROSTAT) 0.4 MG SL tablet  Dispense: 25 tablet; Refill: 6    3. Vitamin D deficiency  - cholecalciferol, vitamin D3, (VITAMIN D3) 50 mcg (2,000 unit) capsule; one capsule daily  Dispense: 90 capsule; Refill: 3    4. Lower back pain  - gabapentin (NEURONTIN) 300 MG capsule; Take 1 capsule (300 mg total) by mouth 2 (two) times a day.  Dispense: 180 capsule; Refill: 3    5. Insomnia  - hydrOXYzine HCl (ATARAX) 50 MG tablet; Take 1 tablet (50 mg total) by mouth at bedtime.  Dispense: 90 tablet; Refill: 3    6. Calderon's esophagus  - omeprazole (PRILOSEC) 40 MG capsule; Take 1 capsule (40 mg total) by mouth at bedtime.  Dispense: 90 capsule; Refill: 3    7. Hypercholesterolemia  - simvastatin (ZOCOR) 40 MG tablet; Take 1 tablet (40 mg total) by mouth at bedtime.  Dispense: 90 tablet; Refill: 3    If protocol passes may refill for 12 months if within 3 months of last provider visit (or a total of 15 months).             LATUDA 80 mg Tab tablet 90 tablet 3     Sig: Take 1 tablet (80 mg total) by mouth at bedtime.       There is no refill protocol information for this order        mometasone-formoterol (DULERA) 100-5 mcg/actuation HFAA inhaler 1 Inhaler 5     Sig: Inhale 2 puffs 2 (two) times a day.       Asthma Medications Refill Protocol Passed - 1/29/2020  2:30 PM        Passed - PCP or prescribing provider visit in last year     Last office visit with prescriber/PCP: 6/12/2019 Aly Constantino MD OR same dept:  6/12/2019 Aly Constantino MD OR same specialty: 6/12/2019 Aly Constantino MD  Last physical: Visit date not found Last MTM visit: Visit date not found    Next appt within 3 mo: Visit date not found Next physical within 3 mo: Visit date not found  Prescriber OR PCP: Aly Constantino MD  Last diagnosis associated with med order: 1. Asthma, mild intermittent  - albuterol (PROAIR HFA;PROVENTIL HFA;VENTOLIN HFA) 90 mcg/actuation inhaler; Inhale 2 puffs every 4 (four) hours as needed for wheezing.  Dispense: 1 Inhaler; Refill: 2  - mometasone-formoterol (DULERA) 100-5 mcg/actuation HFAA inhaler; Inhale 2 puffs 2 (two) times a day.  Dispense: 1 Inhaler; Refill: 5    2. Coronary atherosclerosis  - atenoloL (TENORMIN) 25 MG tablet; TAKE ONE TABLET BY MOUTH ONE TIME DAILY IN THE EVENING.  Dispense: 90 tablet; Refill: 3  - nitroglycerin (NITROSTAT) 0.4 MG SL tablet  Dispense: 25 tablet; Refill: 6    3. Vitamin D deficiency  - cholecalciferol, vitamin D3, (VITAMIN D3) 50 mcg (2,000 unit) capsule; one capsule daily  Dispense: 90 capsule; Refill: 3    4. Lower back pain  - gabapentin (NEURONTIN) 300 MG capsule; Take 1 capsule (300 mg total) by mouth 2 (two) times a day.  Dispense: 180 capsule; Refill: 3    5. Insomnia  - hydrOXYzine HCl (ATARAX) 50 MG tablet; Take 1 tablet (50 mg total) by mouth at bedtime.  Dispense: 90 tablet; Refill: 3    6. Calderon's esophagus  - omeprazole (PRILOSEC) 40 MG capsule; Take 1 capsule (40 mg total) by mouth at bedtime.  Dispense: 90 capsule; Refill: 3    7. Hypercholesterolemia  - simvastatin (ZOCOR) 40 MG tablet; Take 1 tablet (40 mg total) by mouth at bedtime.  Dispense: 90 tablet; Refill: 3    If protocol passes may refill for 6 months if within 3 months of last provider visit (or a total of 9 months).          nitroglycerin (NITROSTAT) 0.4 MG SL tablet 25 tablet 6       There is no refill protocol information for this order        omeprazole (PRILOSEC) 40 MG capsule 90  capsule 3     Sig: Take 1 capsule (40 mg total) by mouth at bedtime.       GI Medications Refill Protocol Passed - 1/29/2020  2:30 PM        Passed - PCP or prescribing provider visit in last 12 or next 3 months.     Last office visit with prescriber/PCP: 6/12/2019 Aly Constantino MD OR same dept: 6/12/2019 Aly Constantino MD OR same specialty: 6/12/2019 Aly Constantino MD  Last physical: Visit date not found Last MTM visit: Visit date not found   Next visit within 3 mo: Visit date not found  Next physical within 3 mo: Visit date not found  Prescriber OR PCP: Aly Constantino MD  Last diagnosis associated with med order: 1. Asthma, mild intermittent  - albuterol (PROAIR HFA;PROVENTIL HFA;VENTOLIN HFA) 90 mcg/actuation inhaler; Inhale 2 puffs every 4 (four) hours as needed for wheezing.  Dispense: 1 Inhaler; Refill: 2  - mometasone-formoterol (DULERA) 100-5 mcg/actuation HFAA inhaler; Inhale 2 puffs 2 (two) times a day.  Dispense: 1 Inhaler; Refill: 5    2. Coronary atherosclerosis  - atenoloL (TENORMIN) 25 MG tablet; TAKE ONE TABLET BY MOUTH ONE TIME DAILY IN THE EVENING.  Dispense: 90 tablet; Refill: 3  - nitroglycerin (NITROSTAT) 0.4 MG SL tablet  Dispense: 25 tablet; Refill: 6    3. Vitamin D deficiency  - cholecalciferol, vitamin D3, (VITAMIN D3) 50 mcg (2,000 unit) capsule; one capsule daily  Dispense: 90 capsule; Refill: 3    4. Lower back pain  - gabapentin (NEURONTIN) 300 MG capsule; Take 1 capsule (300 mg total) by mouth 2 (two) times a day.  Dispense: 180 capsule; Refill: 3    5. Insomnia  - hydrOXYzine HCl (ATARAX) 50 MG tablet; Take 1 tablet (50 mg total) by mouth at bedtime.  Dispense: 90 tablet; Refill: 3    6. Calderon's esophagus  - omeprazole (PRILOSEC) 40 MG capsule; Take 1 capsule (40 mg total) by mouth at bedtime.  Dispense: 90 capsule; Refill: 3    7. Hypercholesterolemia  - simvastatin (ZOCOR) 40 MG tablet; Take 1 tablet (40 mg total) by mouth at bedtime.  Dispense:  90 tablet; Refill: 3    If protocol passes may refill for 12 months if within 3 months of last provider visit (or a total of 15 months).             simvastatin (ZOCOR) 40 MG tablet 90 tablet 3     Sig: Take 1 tablet (40 mg total) by mouth at bedtime.       Statins Refill Protocol (Hmg CoA Reductase Inhibitors) Passed - 1/29/2020  2:30 PM        Passed - PCP or prescribing provider visit in past 12 months      Last office visit with prescriber/PCP: 6/12/2019 Aly Constantino MD OR same dept: 6/12/2019 Aly Constantino MD OR same specialty: 6/12/2019 Aly Constantino MD  Last physical: Visit date not found Last MTM visit: Visit date not found   Next visit within 3 mo: Visit date not found  Next physical within 3 mo: Visit date not found  Prescriber OR PCP: Aly Constantino MD  Last diagnosis associated with med order: 1. Asthma, mild intermittent  - albuterol (PROAIR HFA;PROVENTIL HFA;VENTOLIN HFA) 90 mcg/actuation inhaler; Inhale 2 puffs every 4 (four) hours as needed for wheezing.  Dispense: 1 Inhaler; Refill: 2  - mometasone-formoterol (DULERA) 100-5 mcg/actuation HFAA inhaler; Inhale 2 puffs 2 (two) times a day.  Dispense: 1 Inhaler; Refill: 5    2. Coronary atherosclerosis  - atenoloL (TENORMIN) 25 MG tablet; TAKE ONE TABLET BY MOUTH ONE TIME DAILY IN THE EVENING.  Dispense: 90 tablet; Refill: 3  - nitroglycerin (NITROSTAT) 0.4 MG SL tablet  Dispense: 25 tablet; Refill: 6    3. Vitamin D deficiency  - cholecalciferol, vitamin D3, (VITAMIN D3) 50 mcg (2,000 unit) capsule; one capsule daily  Dispense: 90 capsule; Refill: 3    4. Lower back pain  - gabapentin (NEURONTIN) 300 MG capsule; Take 1 capsule (300 mg total) by mouth 2 (two) times a day.  Dispense: 180 capsule; Refill: 3    5. Insomnia  - hydrOXYzine HCl (ATARAX) 50 MG tablet; Take 1 tablet (50 mg total) by mouth at bedtime.  Dispense: 90 tablet; Refill: 3    6. Calderon's esophagus  - omeprazole (PRILOSEC) 40 MG capsule; Take 1  capsule (40 mg total) by mouth at bedtime.  Dispense: 90 capsule; Refill: 3    7. Hypercholesterolemia  - simvastatin (ZOCOR) 40 MG tablet; Take 1 tablet (40 mg total) by mouth at bedtime.  Dispense: 90 tablet; Refill: 3    If protocol passes may refill for 12 months if within 3 months of last provider visit (or a total of 15 months).

## 2021-06-05 NOTE — TELEPHONE ENCOUNTER
Medication Question or Clarification  Who is calling: Patient   What medication are you calling about (include dose and sig)?:   dicyclomine (BENTYL) 20 mg tablet  20 mg, Oral, 2 times daily         Summary: Take 1 tablet (20 mg total) by mouth 2 (two) times a day., Starting 10/30/2018, Until Discontinued, Normal      Who prescribed the medication?: Aly Constantino MD  What is your question/concern?: The patient states he does not take this and did not know what it is for.  Writer shared IBS but it may be used for other stomach issues.  He said not he does not take it.  After call writer noted the diagnostic of Barretta Esophagus.  Please advise if patient should continue this medication.  Patient is in treatment facility.  The phone you can ask for him by name but no messages.     Requested Pharmacy: [facility]  Okay to leave a detailed message?: No

## 2021-06-05 NOTE — TELEPHONE ENCOUNTER
Pt can stay off the dicyclomine.    He should stay on  mg daily    Also should stay on omeprazole 20 mg daily due to h'o Barretts

## 2021-06-06 NOTE — TELEPHONE ENCOUNTER
Referral Request  Type of referral: Orthopedic  Who s requesting: Patient  Why the request:   Spinal stenosis, foraminal stenosis of lumbar region, cervical radiculopathy  Have you been seen for this request: Yes  Does patient have a preference on a group/provider?   Dr. Ortiz  [facility]  [address] [address] [phone]  Okay to leave a detailed message?  Yes     Patient has consult appointment with Dr. Ortiz on 2/21/2020.

## 2021-06-06 NOTE — TELEPHONE ENCOUNTER
Patient here today for follow up appointment with Dr. Constantino. Will discuss orthopedic referral today.

## 2021-06-06 NOTE — PROGRESS NOTES
"DIAGNOSIS:  1. Cervical radiculopathy  Ambulatory referral to Neurosurgery   2. Spinal stenosis at L4-L5 level     3. Foraminal stenosis of lumbar region     4.      Nicotine abuse, cutting back  5.      H/o meth abuse,  14 days sober      PLAN:     Referral to Dr Palma (Neurosurgery)    MRI'S of the lumbar and cervical spine reports from 20 are reviewed with the pt.      I spent 25 min with patient face-to-face, of which 50% or greater was spent in counseling and coordination of care in regards to patient's left arm pain, ER visits, review of  Scans, exam,  Referral discussion to Neurosurgery..   Please see plan above             HPI:   Follow up from ER ([facility] and [facility] [place]).  Sober 14 days now from meth.  Pt has a sponsor, attends [employer] and sings in the [employer] choir.  Notes that he was baptized in the past year, and subsequently as reminded that it was on the same day as his  wife's (suicide) birthday.    Pain in left hand 1-4th fingers, pins and needles.  6-8 weeks.  Constant pain.  Once in awhile gets \"shooters\" lasting 20-30 sec in the left leg.    Saw a neurologist (Dr Michele Escobar on 2-3-20)  Before his MRI  Cervical scan and was asked to get a cortisone shot.     Down to 4 cigs daily  No pot  Asthma is much better.  No alcohol in 20 years  Brother  17 years ago of meth          Current Outpatient Medications on File Prior to Visit   Medication Sig Dispense Refill     albuterol (PROAIR HFA;PROVENTIL HFA;VENTOLIN HFA) 90 mcg/actuation inhaler Inhale 2 puffs every 4 (four) hours as needed for wheezing. 1 Inhaler 2     aspirin 325 MG tablet Take 325 mg by mouth daily.       atenoloL (TENORMIN) 25 MG tablet TAKE ONE TABLET BY MOUTH ONE TIME DAILY IN THE EVENING. 90 tablet 1     cholecalciferol, vitamin D3, (VITAMIN D3) 50 mcg (2,000 unit) capsule one capsule daily 90 capsule 3     gabapentin (NEURONTIN) 300 MG capsule Take 1 capsule (300 mg total) by mouth 2 (two) times a day. 180 capsule 1     " hydrOXYzine HCl (ATARAX) 50 MG tablet Take 1 tablet (50 mg total) by mouth at bedtime. 90 tablet 1     LATUDA 80 mg Tab tablet Take 1 tablet (80 mg total) by mouth at bedtime. 90 tablet 3     mometasone-formoterol (DULERA) 100-5 mcg/actuation HFAA inhaler Inhale 2 puffs 2 (two) times a day. 1 Inhaler 3     nitroglycerin (NITROSTAT) 0.4 MG SL tablet ONE TABLET UNDER TONGUE AS NEEDED FOR CHEST PAIN( TAKE EVERY 5 MINUTES IF NEEDED, MAX 3 TABLETS) 25 tablet 6     omeprazole (PRILOSEC) 40 MG capsule Take 1 capsule (40 mg total) by mouth at bedtime. 90 capsule 1     pediatric multivitamin (FLINTSTONES) Chew chewable tablet Chew.       simvastatin (ZOCOR) 40 MG tablet Take 1 tablet (40 mg total) by mouth at bedtime. 90 tablet 1     [DISCONTINUED] pregabalin (LYRICA) 100 MG capsule Take by mouth.       [DISCONTINUED] folic acid (FOLVITE) 400 MCG tablet Take 2 tablets (800 mcg total) by mouth daily. 90 tablet 3     No current facility-administered medications on file prior to visit.        Pmh: reviewed  Psh: reviewed  Allergy:  reviewed      EXAM:    /74 (Patient Site: Left Arm, Patient Position: Sitting, Cuff Size: Adult Large)   Pulse 85   Temp 97.4  F (36.3  C) (Oral)   Resp 16   Wt (!) 244 lb 6.4 oz (110.9 kg)   BMI 31.38 kg/m    GEN:   ALERT, NAD, ORIENTED TIMES THREE  NECK: SUPPLE WITHOUT ADENOPATHY OR THYROMEGALY  LUNGS: CTA  COR: RRR WITHOUT MURMUR  MS:  Weak left hand grasp  NEURO:  Absent left bicep and tricep reflexes  EXT: WITHOUT EDEMA/SWELLING    No results found for this or any previous visit (from the past 168 hour(s)).     Cervical spine MRI w/o contrast2/5/2020  [place]  Result Impression   Impression:    1. Multilevel cervical spine degenerative disease, most prominent  finding being the moderate spinal canal stenosis and cord compression  at C3-4, with bilateral neural foraminal stenoses as well.  2. No abnormal signal within the cervical spinal cord.  3. Grade 1 retrolisthesis of C3 on C4,  likely related to underlying  degenerative disease.     Result Impression   Impression:   1. Multilevel degenerative changes of the lumbar spine, greatest at  L5-S1, where there is moderate to severe  bilateral neural foraminal  stenosis with abutment of the exiting nerve roots.  2. Mild bilateral neural foraminal and spinal canal stenosis at L4-5.    I have personally reviewed the examination and initial interpretation  and I agree with the findings.    CHALINO LEAVITT MD   Result Narrative   MR LUMBAR SPINE W/O CONTRAST 2/5/2020 12:07 PM

## 2021-06-09 NOTE — TELEPHONE ENCOUNTER
Refill Approved    Rx renewed per Medication Renewal Policy. Medication was last renewed on 01/29/2020.  Last office visit was 02/11/2020 with PCP.    Stephanie Ann, [facility] Triage/Med Refill 7/19/2020     Requested Prescriptions   Pending Prescriptions Disp Refills     omeprazole (PRILOSEC) 40 MG capsule [Pharmacy Med Name: OMEPRAZOLE 40MG CAPSULES] 90 capsule 1     Sig: TAKE 1 CAPSULE BY MOUTH AT BEDTIME       GI Medications Refill Protocol Passed - 7/18/2020  5:21 PM        Passed - PCP or prescribing provider visit in last 12 or next 3 months.     Last office visit with prescriber/PCP: 2/11/2020 Aly Constantino MD OR same dept: 2/11/2020 Aly Constantino MD OR same specialty: 2/11/2020 Aly Constantino MD  Last physical: Visit date not found Last MTM visit: Visit date not found   Next visit within 3 mo: Visit date not found  Next physical within 3 mo: Visit date not found  Prescriber OR PCP: Aly Constantino MD  Last diagnosis associated with med order: 1. Calderon's esophagus  - omeprazole (PRILOSEC) 40 MG capsule [Pharmacy Med Name: OMEPRAZOLE 40MG CAPSULES]; TAKE 1 CAPSULE BY MOUTH AT BEDTIME  Dispense: 90 capsule; Refill: 1    If protocol passes may refill for 12 months if within 3 months of last provider visit (or a total of 15 months).

## 2021-06-12 NOTE — PROGRESS NOTES
Weekly Progress Note  Kayden Cortez  1970  [id number]    D) Patient attended 0 groups  this week with 4 absences. Patient has remained in contact with staff via voicemail providing updates after his surgery. Patient is to resume group attendance on 8/28/17.  A) Staff facilitated groups and reviewed treatment progress.   R) Unable to assess along six dimensions or for VA due to lack of attendance. Pt working on the following dimensions:  Dimension #1 - Withdrawal Potential - Risk: unable to assess due to lack of attendance.   Dimension #2 - Biomedical - Risk: unable to assess due to lack of attendance.   Dimension #3 - Emotional/Behavioral/Cognitive - Risk: unable to assess due to lack of attendance.   Dimension #4 - Treatment Acceptance/Resistance - Risk: unable to assess due to lack of attendance.   Dimension #5 - Relapse Potential - Risk: unable to assess due to lack of attendance.   Dimension #6 - Recovery Environment - Risk: unable to assess due to lack of attendance.   T) Patient educated on Dual Diagnosis. Patient has completed 3 of 90 hours of program at this time. Projected discharge date is TBD. Current discharge plan is TBD.     Jessi Garcia MA, LMFT, [facility]  8/25/2017, 2:51 PM     Weekly Educational Topics Date Education   1. Understanding Dual Diagnoses, week 1 8/14  Excused on 8/15, 8/16, 8/18 No kidding me 2   2. Understanding Dual Diagnoses, week 2 Excused 8/21-8/23, 8/25    3. Stress Management     4. Managing Difficult Feelings     5. Managing Thinking Problems     6. Building Recovery Support     7. Developing Assertive Communication Skills     8. Relapse Prevention     9. Relationships/Boundaries     10. Grief and Loss     11. Strengths     12. Wellness     Seeking Safety Unit every Tuesday     Mindfulness every Friday
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
## 2021-06-12 NOTE — PROGRESS NOTES
Kayden Cortez attended 3 hours of group therapy today.    Jessi Garcia MA, LMFT, [facility] [place]  8/14/2017, 12:26 PM

## 2021-06-12 NOTE — PROGRESS NOTES
Weekly Progress Note  Kayden Cortez  1970  [id number]      D) Patient attended 1 group this week with 3 absences.   A) Staff facilitated groups and reviewed treatment progress. Assessed for VA.   R) No VAP needed at this time. Pt working on the following dimensions:  Dimension #1 - Withdrawal Potential - Risk 0, no concern. Patient reports that he last used methamphetamine approx. 6 weeks ago. He denies any intoxication or withdrawal symptoms and none have been observed.   Dimension #2 - Biomedical - Risk 1. Mild concern. Patient reports some medical issues. He endorses an upcoming surgery on 8/18. He reports having a PCP and is able to access medical care when needed.  Dimension #3 - Emotional/Behavioral/Cognitive - Risk 2, moderate concern. Patient reports  diagnoses of bipolar disorder, PTSD and ADHD. He is prescribed psychotropic medications to manage. He identifies receiving mental health services through [facility] clinic and records have been requested related to mental health services. He also reports actively receiving case management services through [facility]. He reports stable mental health as this time, however, did identify feeling overwhelmed with upcoming medical appointments and surgery.   Dimension #4 - Treatment Acceptance/Resistance - Risk 0, no concern. Patient reports both internal and external motivation. He states he is motivated to maintain sobriety in order to regain custody of his daughter and also comply with probation. Patient has informed writer of upcoming appointments that conflict with treatment.   Dimension #5 - Relapse Potential - Risk 2, moderate concern. Patient denies any urges or cravings to use. He has a long history of substance use resulting in several treatment episodes. He appears to have some knowledge of addiction concepts and relapse prevention skills, however, has been unsuccessful in maintaining long term sobriety.   Dimension #6 - Recovery Environment  - Risk 3, serious concern. Patient is not currently unemployed and seems to have minimal structure in his daily activities. He reports that his parents are supportive. He is currently involved with [facility] as well as having CPS involvement for his minor daughter. He currently lives alone.   T) Patient educated on Dual Diagnosis. Patient has completed 3 of 90 hours of program at this time. Projected discharge date is TBD. Current discharge plan is TBD.     Jessi Garcia MA, LMFT, [facility]  8/18/2017, 3:44 PM     Weekly Educational Topics Date Education   1. Understanding Dual Diagnoses, week 1 8/14  Excused on 8/15, 8/16, 8/18 No kidding me 2   2. Understanding Dual Diagnoses, week 2     3. Stress Management     4. Managing Difficult Feelings     5. Managing Thinking Problems     6. Building Recovery Support     7. Developing Assertive Communication Skills     8. Relapse Prevention     9. Relationships/Boundaries     10. Grief and Loss     11. Strengths     12. Wellness     Seeking Safety Unit every Tuesday     Mindfulness every Friday

## 2021-06-12 NOTE — PATIENT INSTRUCTIONS - HE
Flu vaccine    Tetanus update    Med refills    Follow up for fasting labs    Schedule Upper Endoscopy

## 2021-06-12 NOTE — PROGRESS NOTES
Addiction Services - Initial Services Plan      Name:  Kayden Cortez       :  1970        MRN:  [id number]    Goal Methods   1.  Acceptance of chemical dependency and mental illness as a disease. A.  Comply with all med/psych recommendations  B.  Complete preliminary interviews  C.  Attend all program functions  D.  Attend all individual counseling sessions  E.  Read all assigned literature  F.  Complete psychological testing as assigned  G.  Particpate in any necessary consultations     2.  Acceptance of my need and ability to change A.  Complete written workbook assignments on MICD  B.  Participate in conferences (P.O., , family)  C.  Participate in 12 Step/support groups if desired  D.  Actively participate in treatment planning and reviews  E.  Complete all assignments given or recommended on Treatment Plan  F.  Present Drug History/Peer Assessment with peer group  G.  Complete 10th Step Inventory daily   3.  Acceptance of staff recommendations as a means to my recovery A.  Participate in all interviews for Continuum of Care Plans  B.  Familiarize self with 12 Step Recovery Program and how this applies to your day-to-day behavior  C.  Demonstrate a working knowledge of AA steps of recovery  D.  Obtain a sponsor if desired     Patient describes their immediate need:  Get back into treatment  Are there any immediate Safety Needs such as (physical, stability, mobility):    Upcoming surgery () repair torn tendons in arm    Immediate Health Needs and Plan:    Client will miss group on  because of same-day surgery    Vulnerable Adult:  No    [x] Continue Current Medications for:   [] Request Consult for:  [] Notify Attending Physician about:  [] Other:      Issues to be addressed in the first sessions:    Become acquainted with group norms and other group members.   Set up time to meet 1:1 with primary counselor.     Patient strengths and needs:  Strengths: working independently or with  others-versatile that way    Needs: need to work on memory (short-term memory loss because of PTSD - forget my phone # or to brush my teeth or regular appointments) - learn coping skills    Plan for patient for time between intake and completion of the treatment plan:  Remain abstinent from any illicit substance/alcohol use, and start group on 8/14/2017.  Participate in all treatment activities and assignments.     Staff Members' Titles authorized to Initiate Services are:    Director of Behavioral Service    Clinical Director of Chemical Dependency    Primary Counselor    MI/CHELO Sr. Counselor        Nursing Staff    Vulnerable Adult Review  [x] Review of the facility Abuse Prevention plan was reviewed with the patient  [x] No individual abuse plan is necessary  [] In addition to the facility Abuse Prevention plan, an Individual Abuse Plan will be put in place    I understand these goals to be the Treatment Goals of the Program, and I agree to the stated Methods in attempting to accomplish these goals.    Patient Signature:  _________________________Date:  8/11/2017      Staff Name/Title:  JOSE Mariscal    Date:  8/11/2017  Time: 9:20 AM

## 2021-06-12 NOTE — PROGRESS NOTES
DIAGNOSIS:  1. Bipolar Disorder  LATUDA 80 mg Tab tablet   2. Asthma, mild intermittent  albuterol (PROAIR HFA;PROVENTIL HFA;VENTOLIN HFA) 90 mcg/actuation inhaler    mometasone-formoterol (DULERA) 100-5 mcg/actuation HFAA inhaler   3. Coronary atherosclerosis  atenoloL (TENORMIN) 25 MG tablet    nitroglycerin (NITROSTAT) 0.4 MG SL tablet    aspirin 325 MG tablet   4. Lower back pain  gabapentin (NEURONTIN) 300 MG capsule   5. Insomnia  hydrOXYzine HCL (ATARAX) 50 MG tablet   6. Hypercholesterolemia  simvastatin (ZOCOR) 40 MG tablet    Comprehensive Metabolic Panel    Lipid Profile   7. Calderon's esophagus  omeprazole (PRILOSEC) 40 MG capsule    Ambulatory referral for Upper GI Endoscopy    CANCELED: Ambulatory referral for Upper GI Endoscopy   8. Health care maintenance  multivitamin (ONE A DAY) per tablet   9. Need for influenza vaccination  Influenza, Recombinant, Inj, Quadrivalent, PF, 18+YRS   10. Need for tetanus booster  Td, Preservative Free (green label)   11. Screening PSA (prostate specific antigen)  PSA, Annual Screen (Prostatic-Specific Antigen)   12. Gastroesophageal reflux disease without esophagitis  Ambulatory referral for Upper GI Endoscopy    CANCELED: Ambulatory referral for Upper GI Endoscopy   13. Dysphagia, unspecified type  Thyroid [place]    Ambulatory referral for Upper GI Endoscopy       PLAN:     Flu vaccine    Tetanus update    Med refills    Follow up for fasting labs    Schedule Upper Endoscopy    I have counseled the patient for tobacco cessation and the follow up will occur  at the next visit.        HPI:   Two- three weeks of occas problems with swallowing soft foods.  Also now has to swallow his pills one at a time.   Chewing well and taking smaller bites.  Has had teeth extractions and is awaitng to get a properly fitting set of dentures.  Had some heartburn for a bit when he didn't have omeprole.  Now is tolerable on omeprazole 40 mg daily.    PHQ-9:  17        Current Outpatient  "Medications on File Prior to Visit   Medication Sig Dispense Refill     cholecalciferol, vitamin D3, (VITAMIN D3) 50 mcg (2,000 unit) capsule one capsule daily 90 capsule 3     pediatric multivitamin (FLINTSTONES) Chew chewable tablet Chew.       [DISCONTINUED] albuterol (PROAIR HFA;PROVENTIL HFA;VENTOLIN HFA) 90 mcg/actuation inhaler Inhale 2 puffs every 4 (four) hours as needed for wheezing. 1 Inhaler 2     [DISCONTINUED] aspirin 325 MG tablet Take 325 mg by mouth daily.       [DISCONTINUED] atenoloL (TENORMIN) 25 MG tablet TAKE ONE TABLET BY MOUTH ONE TIME DAILY IN THE EVENING. 90 tablet 0     [DISCONTINUED] gabapentin (NEURONTIN) 300 MG capsule Take 1 capsule (300 mg total) by mouth 2 (two) times a day. 180 capsule 1     [DISCONTINUED] hydrOXYzine HCl (ATARAX) 50 MG tablet Take 1 tablet (50 mg total) by mouth at bedtime. 90 tablet 1     [DISCONTINUED] LATUDA 80 mg Tab tablet Take 1 tablet (80 mg total) by mouth at bedtime. 90 tablet 3     [DISCONTINUED] mometasone-formoterol (DULERA) 100-5 mcg/actuation HFAA inhaler Inhale 2 puffs 2 (two) times a day. 1 Inhaler 3     [DISCONTINUED] nitroglycerin (NITROSTAT) 0.4 MG SL tablet ONE TABLET UNDER TONGUE AS NEEDED FOR CHEST PAIN( TAKE EVERY 5 MINUTES IF NEEDED, MAX 3 TABLETS) 25 tablet 6     [DISCONTINUED] omeprazole (PRILOSEC) 40 MG capsule TAKE 1 CAPSULE BY MOUTH AT BEDTIME 90 capsule 2     [DISCONTINUED] simvastatin (ZOCOR) 40 MG tablet Take 1 tablet (40 mg total) by mouth at bedtime. 90 tablet 1     No current facility-administered medications on file prior to visit.        Pmh: reviewed  Psh: reviewed  Allergy:  reviewed      EXAM:    /84 (Patient Site: Left Arm, Patient Position: Sitting, Cuff Size: Adult Large)   Pulse (!) 101   Temp 96.9  F (36.1  C) (Oral)   Resp 20   Ht 5' 11.75\" (1.822 m)   Wt (!) 245 lb (111.1 kg)   BMI 33.46 kg/m    GEN:   ALERT, NAD, ORIENTED TIMES THREE  NECK: SUPPLE WITHOUT ADENOPATHY OR THYROMEGALY  LUNGS: CTA  COR: RRR " WITHOUT MURMUR  SKIN: NO RASH , ULCERS OR LESIONS NOTED  EXT: WITHOUT EDEMA/SWELLING    No results found for this or any previous visit (from the past 168 hour(s)).     No results found for: PSA

## 2021-06-12 NOTE — TELEPHONE ENCOUNTER
Refill Approved    Rx renewed per Medication Renewal Policy. Medication was last renewed on 1/29/20, last OV 2/11/20.    Jannette Baig, [facility] Triage/Med Refill 10/18/2020     Requested Prescriptions   Pending Prescriptions Disp Refills     atenoloL (TENORMIN) 25 MG tablet 90 tablet 1     Sig: TAKE ONE TABLET BY MOUTH ONE TIME DAILY IN THE EVENING.       Beta-Blockers Refill Protocol Passed - 10/16/2020 11:47 AM        Passed - PCP or prescribing provider visit in past 12 months or next 3 months     Last office visit with prescriber/PCP: 2/11/2020 Aly Constantino MD OR same dept: 2/11/2020 Aly Constantino MD OR same specialty: 2/11/2020 Aly Constantino MD  Last physical: Visit date not found Last MTM visit: Visit date not found   Next visit within 3 mo: Visit date not found  Next physical within 3 mo: Visit date not found  Prescriber OR PCP: Aly Constantino MD  Last diagnosis associated with med order: 1. Coronary atherosclerosis  - atenoloL (TENORMIN) 25 MG tablet; TAKE ONE TABLET BY MOUTH ONE TIME DAILY IN THE EVENING.  Dispense: 90 tablet; Refill: 1    If protocol passes may refill for 12 months if within 3 months of last provider visit (or a total of 15 months).             Passed - Blood pressure filed in past 12 months     BP Readings from Last 1 Encounters:   02/11/20 120/74

## 2021-06-12 NOTE — PROGRESS NOTES
Weekly Progress Note  Kayden Cortez  1970  [id number]      D) Patient attended 1 group this week with 3 absences.   A) Staff facilitated groups and reviewed treatment progress. Assessed for VA.   R) No VAP needed at this time. Pt working on the following dimensions:  Dimension #1 - Withdrawal Potential - Risk 0, no concern. Patient reports that he last used methamphetamine approx. 6 weeks ago. He denies any intoxication or withdrawal symptoms and none have been observed.   Dimension #2 - Biomedical - Risk 1. Mild concern. Patient reports that his surgery recovery is going well. He has multiple appointments coming up for follow up. He denied any additional medical concerns. Patient is attending ongoing PT appointments which have been scheduled for Wednesdays going forward. Patient reported that he is trying to reschedule this appointments for Thursdays.   Dimension #3 - Emotional/Behavioral/Cognitive - Risk 2, moderate concern. Patient reports  diagnoses of bipolar disorder, PTSD and ADHD. He is prescribed psychotropic medications to manage. He identifies receiving mental health services through [facility] and records have been requested related to mental health services, although have not yet been received. He also reports actively receiving case management services through [facility]. He reports stable mental health as this time, however, did report feeling anxious and depressed this week. Patient reports that he is going to re-engage with therapy and psychiatry services at [facility], however, at this time has not contacted them. Patient continues to endorse difficult emotions related to his wife's death and reports that he has not dealt with it yet.   Dimension #4 - Treatment Acceptance/Resistance - Risk 0, no concern. Patient reports both internal and external motivation. He states he is motivated to maintain sobriety in order to regain custody of his daughter and also comply  with probation. Patient was actively engaged in group this week. Patient communicated his absence on 9/6 with writer.   Dimension #5 - Relapse Potential - Risk 2, moderate concern. Patient denies any urges or cravings to use. He has a long history of substance use resulting in several treatment episodes. He appears to have some knowledge of addiction concepts and relapse prevention skills, however, has been unsuccessful in maintaining long term sobriety. Patient denied any cravings or urges to use this week. Patient reported that he has often used substances to cope with difficult emotions and feelings.   Dimension #6 - Recovery Environment - Risk 3, serious concern. Patient is not currently unemployed and seems to have minimal structure in his daily activities. He reports that his parents are supportive. [facility] as well as having CPS involvement for his minor daughter. He currently lives alone in an apartment. Patient reports that he needs to follow up with [facility] [employer] to ensure he can remain in a 2 bedroom apartment due to the status of his CPS case.   T) Patient educated on Managing difficult feelings. Patient has completed 15 of 90 hours of program at this time. Projected discharge date is TBD. Current discharge plan is TBD.     Jessi Garcia MA, LMFT, [facility]  9/8/2017, 2:21 PM     Weekly Educational Topics Date Education   1. Understanding Dual Diagnoses, week 1 8/14  Excused on 8/15, 8/16, 8/18 No kidding me 2   2. Understanding Dual Diagnoses, week 2 Excused 8/21-8/23, 8/25     3. Stress Management 8/28 8/29  Excused 8/30  Unexcused 9/1 GENARO Talk-Good Stress  GENARO Talk-Stress   4. Managing Difficult Feelings 9/5  Excused 9/6 9/8 Inside Out Clips   5. Managing Thinking Problems     6. Building Recovery Support     7. Developing Assertive Communication Skills     8. Relapse Prevention     9. Relationships/Boundaries     10. Grief and Loss     11. Strengths      12. Wellness     Seeking Safety Unit every Tuesday     Mindfulness every Friday

## 2021-06-12 NOTE — PROGRESS NOTES
INDIVIDUAL SESSION NOTE    D) Patient met 1:1 with counselor for an individual session.     A) Writer reviewed treatment plan with patient. Pt indicated no additional changes be made at this time. Writer explained the layout of the treatment plan and 6 dimensions. Patient expressed understanding. Writer inquired what pt's goals are for treatment. Writer also inquired about pt's CPS case status. Writer assisted pt in setting up medical rides as pt expressed issues with transportation.     R) Pt expressed that he has struggled with maintaining sobriety since the death of his wife and subsequent CPS involvement with his minor daughter. He reported that he had been using substances to cope with the grief and loss associated with these events. Pt stated that he is working towards getting his daughter back however is unsure of where things are at and writer encouraged him to speak with CPS worker about the status of his reunification. Pt stated that he wants to learn how to cope with difficult situations sober and be the father that his daughter needs. He also stated that he plans to re-engage with the [facility] and call them to set up new appointments. Pt verbalized continued pain from tendon repair surgery and stated he has some upcoming follow up appointments that might interfere with group times and will let writer know ASAP.     T) Pt to call writer about medical appointments and set up medical rides for treatment programming.     Jessi Garcia MA, LMFT, [facility]  8/31/2017, 9:09 AM

## 2021-06-12 NOTE — PROGRESS NOTES
Time in/out:    ASSESSMENT & PLAN:  Kayden Cortez was seen in preoperative consultation in preparation for tendon repair - in left arm.  This is a low risk surgery and the patient has no increased risk for major cardiac complications based on exam and history and appears to be medically stable for the proposed procedure.      1. Pre-op exam  Patients medical conditions are maximally managed for surgery.  Will hold am meds and has been holding ASA for 1 week  - Electrocardiogram Perform and Read    2. Coronary atherosclerosis  Stable - EKG normal and BP normal.  OK to proceed with proceedure    3. Asthma, mild intermittent  Stable - continue with inhalers    4. Methamphetamine Abuse - In Remission  No use in >2 months.        Patient approved for surgery with general or local anesthesia. Postoperative pain to be managed by surgeon during post-operative [employer] [facility] timeframe, typically 30-60 days for major surgery, and less for others. Labs will be done as indicated. Copy of the pre-op was given to the patient to bring along on the day of surgery. Low Risk Surgery.    Labs performed include:  BMP     Orders Placed This Encounter   Procedures     Electrocardiogram Perform and Read     Medications Discontinued During This Encounter   Medication Reason     thiamine 100 MG tablet Therapy completed     albuterol (PROVENTIL) 2.5 mg /3 mL (0.083 %) nebulizer solution Therapy completed     famotidine (PEPCID) 40 MG tablet        No Follow-up on file.        CHIEF COMPLAINT:  Chief Complaint   Patient presents with     Pre-op Exam     Left Arm Tendon Surgery; Dr. Weinstein/[facility]; surgery at [facility] Fax #[phone]       HISTORY OF PRESENT ILLNESS:  Kayden is a 47 y.o. year-old male  here for an internal medicine pre-operative consultation. The exam is requested by Dr. Weinstein in preparation for tnedon repair to be performed at [facility] on 8/1/2017. Today s examination on  07/31/17 is done to review the underlying surgical condition of CAD, asthma, clear for anesthesia, and review medical problems with appropriate changes in medications.    he has tolerated previous surgeries well without bleeding or anesthesia difficulty.     REVIEW OF SYSTEMS:  Comprehensive review of systems is negative.    Specifically, he denies any symptoms of recent cardiovascular issues, chest pain, chest pressure, shortness of breath, orthopnea, paroxysmal dyspnea, breathing concerns, or dramatic changes in exercise tolerance. No current cough, fever, or URI symptoms. When it is hot - asthma will become worse.  Currently stable.     PFSH:  No personal or family history of bleeding disorders or blood clots.  No personal or family history of anesthesia reactions.  Last Meth use was May 2017.      Past Medical History:   Diagnosis Date     Asthma      Bipolar depression      Coronary artery disease      GERD (gastroesophageal reflux disease)      Hypercholesteremia      PTSD (post-traumatic stress disorder)      No Known Allergies  Past Surgical History:   Procedure Laterality Date     ANKLE ARTHROSCOPY Left 4/16/2015    Procedure:  ANKLE ARTHROSCOPY DEBRIDEMENT SYNOVECTOMY;  Surgeon: Raleigh Bob DPM;  Location: [facility] OR;  Service:      ANKLESURGERY Right      CARDIAC CATHETERIZATION       LUMBAR SPINE SURGERY       OR APPENDECTOMY      Description: Appendectomy;  Recorded: 08/09/2011;     OR LAP,CHOLECYSTECTOMY      Description: Cholecystectomy Laparoscopic;  Recorded: 08/09/2011;  Comments: ABOUT 1995     ROTATOR CUFF REPAIR Right      Family History   Problem Relation Age of Onset     Diabetes Mother      Diabetes Father      Cancer Father      CANCER     Social History     Social History     Marital status:      Spouse name: N/A     Number of children: 1     Years of education: N/A     Occupational History      Not Employed     Social History Main Topics     Smoking status: Current Every  "Day Smoker     Packs/day: 0.75     Types: Cigarettes     Smokeless tobacco: Never Used      Comment: also using e-cigarette     Alcohol use No      Comment: \"rarely\"     Drug use: No      Comment: meth use in the past,  quit 1 year ago     Sexual activity: Not on file     Other Topics Concern     Not on file     Social History Narrative       VITALS:  Vitals:    07/31/17 1516   BP: 112/70   Pulse: (!) 108   Resp: 24   Temp: 97.9  F (36.6  C)     Wt Readings from Last 3 Encounters:   07/31/17 (!) 240 lb 6.4 oz (109 kg)   06/05/17 (!) 245 lb (111.1 kg)   02/26/17 (!) 255 lb (115.7 kg)     Body mass index is 33.06 kg/(m^2).      PHYSICAL EXAM:  Constitutional:  Reveals a healthy,  male.  Vitals:  Per nursing notes.  Ears:  Clear.  Oropharynx:  Without posterior nasal drainage or thrush.  Neck:  Supple, no lymphadenopathy,  thyroid not palpable.  Cardiac:  Regular rate and rhythm without murmurs, rubs, or gallops.  Legs without edema.   Lungs: Clear.  Respiratory effort normal.  Abdomen:   Bowel sounds positive, non-tender, non-distended.  Neither liver nor spleen palpable.  Skin:   Without rash, bruise, or palpable lesions.  Extremities: left arm in brace.   Rheumatologic: Normal joints and nails of the hands.  Neurologic:  No gross focal deficits.      Psychiatric:  Mood appropriate, memory intact.       EKG: NSR, NL axis,no ST changes- no changes from 2014    Additional history summarized (from old records or history from someone other than the patient or another healthcare provider) (2 TOTAL): 2 reviewed ortho notes.     Decision to obtain extra information (old records requested or history from another person or accessing Care Everywhere) (1 TOTAL): None.     Radiology tests summarized or ordered (XRAY/CT/MRI/DXA) (1 TOTAL): None.    Labs reviewed or ordered (1 TOTAL): ordered labs.    Medicine tests summarized or ordered (EKG/ECHO) (1 TOTAL): EKG ordered.    Independent review of EKG or X-Ray (2 EACH): 2EKG read. "       The visit lasted a total of 25 minutes face to face with the patient. Over 50% of the time was spent counseling and educating the patient about their upcoming procedure.      MEDICATIONS:  Current Outpatient Prescriptions   Medication Sig Dispense Refill     albuterol (PROVENTIL HFA;VENTOLIN HFA) 90 mcg/actuation inhaler Inhale 2 puffs every 4 (four) hours as needed for wheezing.       aspirin 325 MG tablet Take 325 mg by mouth daily.       atenolol (TENORMIN) 25 MG tablet TAKE ONE TABLET BY MOUTH ONE TIME DAILY IN THE EVENING.  30 tablet 2     cholecalciferol, vitamin D3, (VITAMIN D3) 2,000 unit cap Take 2 capsules orally daily for 2 months and then one capsule daily thereafter 60 each 11     dicyclomine (BENTYL) 20 mg tablet Take 1 tablet (20 mg total) by mouth 2 (two) times a day. 20 tablet 0     folic acid (FOLVITE) 400 MCG tablet TAKE TWO TABLETS BY MOUTH DAILY  60 tablet 2     gabapentin (NEURONTIN) 300 MG capsule Take 1 capsule (300 mg total) by mouth 2 (two) times a day. 60 capsule 11     hydrOXYzine (ATARAX) 50 MG tablet Take 1 tablet (50 mg total) by mouth bedtime. 30 tablet 11     LATUDA 80 mg Tab   2     mometasone-formoterol (DULERA) 100-5 mcg/actuation HFAA inhaler Inhale 2 puffs 2 (two) times a day. 1 Inhaler 5     nitroglycerin (NITROSTAT) 0.4 MG SL tablet Place 0.4 mg under the tongue every 5 (five) minutes as needed for chest pain.       omeprazole (PRILOSEC) 40 MG capsule Take 1 capsule (40 mg total) by mouth bedtime. 90 capsule 3     ondansetron (ZOFRAN, AS HYDROCHLORIDE,) 4 mg/5 mL solution Take 2.5 mL (2 mg total) by mouth 3 (three) times a day as needed for nausea. 10 mL 0     pediatric multivitamin (FLINTSTONES) Chew chewable tablet Chew.       simvastatin (ZOCOR) 40 MG tablet Take 1 tablet (40 mg total) by mouth bedtime. 90 tablet 0     No current facility-administered medications for this visit.        Total Data Points: 6

## 2021-06-12 NOTE — PROGRESS NOTES
[facility]   Mental Health and Addiction Care   [facility], [facility], and [facility]    [phone] or [phone]   Treatment Plan    Patient  Name: Kayden Cortez  MRN: [id number]   Counselor: Jose Bowen MA, LMFT, [facility]    Title: Dimension 1 Withdrawal Potential, Risk level 0, no concerns  Plan Date: 8/28/2017  Problem: Patient reports last date of use as 6/27/17 and further reported 3 relapses for methamphetamines in the past 6 months.     Goal: Begin Date: 8/28/2017  Target Date: 10/20/2017  Maintain abstinence throughout treatment in order to avoid experiencing withdrawal symptoms and further your recovery.    Method 1: Begin Date:8/28/2017   Target Date: 10/20/2017  Stauts: Ongoing  Patient to refrain from any illicit substance or alcohol use, and report any substance/alcohol use to primary counselor to determine if any changes need to be made to the treatment plan to address withdrawal.       Title: Dimension 2 Biomedical condition, Risk level 1, mild concerns  Plan Date: 8/28/2017  Problem: Patient has multiple medical issues including current dx of CAD, chronic back issues and recent surgery to address torn tendons in arm. Patient has ability to seek medical care when needed and has a current PCP.    Goal: Begin Date: 8/28/2017  Target Date: 10/20/2017  To maintain stable physical health to participate in treatment services, while also making healthy changes to support ongoing recovery.     Method 1: Begin Date:8/28/2017   Target Date: 10/20/2017  Stauts: Ongoing  Patient to report any changes in physical health that would impact treatment participation.     Is Nicotine use indicated on the assessment? YES  Method 2: Begin Date: 8/28/2017  Target Date: 10/20/2017  Status: In progress   Staff to provide patient with nicotine cessation information and help on how to quit use. Patient to report progress on stopping nicotine use to staff.       Title: Dimension 3, Emotional, behavioral,  cognitive condition, Risk level 2, moderate concerns  Plan Date: 8/28/2017  Problem: Patient reports MH dx of Bipolar disorder, PTSD and ADHD. He has psychiatric and MH therapy services through [facility] and also has case management services through [facility].     Goal: Begin Date: 8/28/2017  Target Date: 10/20/2017  Patient to report better understanding of the relationship between mental health and substance use and be better able to cope with mental health symptoms.     Method 1: Begin Date: 8/28/2017   Target Date: 10/20/2017  Status: In progress  Patient to meet with primary counselor or other MH staff for the purpose of completed a diagnostic assessment, or sign release of information from current MH provider.    Method 2: Begin Date: 8/28/2017  Target Date: 10/20/2017  Status: Ongoing  Patient to begin using coping skills learned in MICD group (such as grounding, breathing, thought challenging, mindfulness, etc), and share in daily check-in any benefits or challenges that you experience using these skills.      Title: Dimension 4, Treatment Acceptance/Resistance, Risk level 0, noconcerns  Plan Date: 8/28/2017  Problem: Patient reports internal motivation to attend treatment and maintain sobriety, however, [facility] that is also a motivating factor.     Goal: Begin Date: 8/28/2017  Target Date: 10/20/2017  Patient to identify motivation for abstinence and gain further insight into barriers that have prevented maintaining abstinence.     Method 1: Begin Date: 8/28/2017   Target Date: 10/20/2017  Status: Ongoing  Patient to attend all schedule MICD groups or 1:1s, and contact staff if unable to attend. (Jessi: [phone])    Method 2: Begin Date: 8/28/2017  Target Date: 10/20/2017  Status: In progress  Patient to identify 5 reasons to maintain abstinence.       Title: Dimension 5, Relapse potential, Risk level 2, moderate  concerns  Plan Date: 8/28/2017   Problem: Patient reports a history of multiple treatment attempts over the last 5 years and subsequent relapses. Patient appears to have some knowledge of addiction concepts and relapse prevention strategies.     Goal: Begin Date: 8/28/2017  Target Date: 10/20/2017  Further develop relapse prevention and coping skills to utilizing when experiencing situations that increased the likelihood of relapse.     Method 1: Begin Date: 8/28/2017  Target Date: 10/20/2017   Status: Ongoing  Patient to share in daily check-in any urges and addictive thinking to better understand his pattern of use and to prevent relapse in the future.     Method 2: Begin Date: 8/28/2017  Target Date: 10/20/2017   Status: In progress  Patient to meet with primary counselor to identify personal triggers for relapse.       Title: Dimension 6, Recovery Environment, Risk level 3, serious concerns  Plan Date: 8/28/2017  Problem: Patient is unemployed and has little daily structure. He is involved with [facility] due to a drug possession charge and reports significant criminal history. He has current CPS involvement for his minor daughter who lives with her grandparents in [place].     Goal: Begin Date: 8/28/2017  Target Date: 10/20/2017  Explore and make lifestyle changes needed to maintain recovery free from substance use and legal problems.     Method 1: Begin Date: 8/28/2017  Target Date: 10/20/2017  Status: On going  Maintain contact with  and CPS worker and follow both case plans.     Method 2: Begin Date: 8/28/2017  Target Date: 10/20/2017  Status: In progress  Explore the connection between substance use and legal problems.       Plan was to complete treatment plan with patient prior to his surgery on 8/18/17, however, patient has not attended group since 8/14/17. Staff will review treatment plan with patient when he returns and make changes as necessary.       By signing this document,  I am acknowledging that I was actively and directly involved in the development of my treatment plan.           Patient Signature:_____________________________ Date: ______        Staff Signature:  Jessi Garcia MA, LMFT, [facility]      Date: 8/28/2017

## 2021-06-12 NOTE — PROGRESS NOTES
Weekly Progress Note  Kayden Cortez  1970  [id number]      D) Patient attended 1 group this week with 3 absences.   A) Staff facilitated groups and reviewed treatment progress. Assessed for [facility].   R) No VAP needed at this time. Pt working on the following dimensions:  Dimension #1 - Withdrawal Potential - Risk 0, no concern. Patient reports that he last used methamphetamine approx. 6 weeks ago. He denies any intoxication or withdrawal symptoms and none have been observed.   Dimension #2 - Biomedical - Risk 1. Mild concern. Patient reports that his surgery recovery is going well. He has multiple appointments coming up for follow up. He denied any additional medical concerns.   Dimension #3 - Emotional/Behavioral/Cognitive - Risk 2, moderate concern. Patient reports  diagnoses of bipolar disorder, PTSD and ADHD. He is prescribed psychotropic medications to manage. He identifies receiving mental health services through [facility] clinic and records have been requested related to mental health services. He also reports actively receiving case management services through [facility]. He reports stable mental health as this time, however, did report feeling anxious this week. Patient reports that he is going to re-engage with therapy and psychiatry services at [facility].   Dimension #4 - Treatment Acceptance/Resistance - Risk 0, no concern. Patient reports both internal and external motivation. He states he is motivated to maintain sobriety in order to regain custody of his daughter and also comply with probation. Patient was actively engaged in group this week.   Dimension #5 - Relapse Potential - Risk 2, moderate concern. Patient denies any urges or cravings to use. He has a long history of substance use resulting in several treatment episodes. He appears to have some knowledge of addiction concepts and relapse prevention skills, however, has been unsuccessful in maintaining long term  sobriety. Patient denied any cravings or urges to use this week.   Dimension #6 - Recovery Environment - Risk 3, serious concern. Patient is not currently unemployed and seems to have minimal structure in his daily activities. He reports that his parents are supportive. [facility] as well as having CPS involvement for his minor daughter. He currently lives alone.   T) Patient educated on Stress Management. Patient has completed 9 of 90 hours of program at this time. Projected discharge date is TBD. Current discharge plan is TBD.     Jessi Garcia MA, LMFT, [facility]  9/1/2017, 3:36 PM     Weekly Educational Topics Date Education   1. Understanding Dual Diagnoses, week 1 8/14  Excused on 8/15, 8/16, 8/18 No kidding me 2   2. Understanding Dual Diagnoses, week 2 Excused 8/21-8/23, 8/25     3. Stress Management 8/28 8/29  Excused 8/30  Unexcused 9/1 GENARO Talk-Good Stress  GENARO Talk-Stress   4. Managing Difficult Feelings     5. Managing Thinking Problems     6. Building Recovery Support     7. Developing Assertive Communication Skills     8. Relapse Prevention     9. Relationships/Boundaries     10. Grief and Loss     11. Strengths     12. Wellness     Seeking Safety Unit every Tuesday     Mindfulness every Friday

## 2021-06-13 NOTE — PROGRESS NOTES
"Late entry from 10/20/17    INDIVIDUAL SESSION NOTE    D) Patient met 1:1 with counselor for an individual session lasting 60 minutes.     A) Writer discussed patient's progress in treatment and concerns about lack of attendance. Writer discussed concerns about patient's lack of sober support and its impact on his ability to maintain sobriety. Writer discussed with patient the pros and cons of continuing and ending his relationship with his current girlfriend. Writer also challenged patient on his recent avoidance of discussing certain topics in group including relationships and grief and loss, which patient reported was related to his late wife's suicide.     R) Patient acknowledged that his attendance has been an issue and stated that he is committed to completing treatment and has a desire to maintain his sobriety. Patient reported that he has been attending support group meetings, however, is not staying for the duration. He further reported that he has been helping others struggling with addiction and feels this is more beneficial for him. He also verbalized a desire to resume attending the [facility] in [place], stating that he was drawn to this place previously and feels it would be a good thing for him to incorporate into his recovery. Patient discussed concerns about his relationship with his girlfriend, primarily that she has been using and that he \"is not in love with her\".     T) Patient will call writer the day of any absences otherwise will be discharged from MICD program. Patient will continue attending 4x weekly.       Patient treatment was reviewed. Changes were made. Patient was actively and directly involved in the treatment plan review and updates.     Jessi Garcia MA, LMFT, [facility]  10/25/2017, 3:18 PM      "

## 2021-06-13 NOTE — PROGRESS NOTES
Weekly Progress Note  Kayden Cortez  1970  [id number]      D) Patient attended 3 group this week with 1 absences. Patient attended 0 individual sessions this week.   A) Staff facilitated groups and reviewed treatment progress. Assessed for [facility].   R) No VAP needed at this time. Pt working on the following dimensions:  Dimension #1 - Withdrawal Potential - Risk 0, no concerns. Patient completed a UA on 9/13/17 which was positive for Methamphetamines. Patient denied methamphetamine use and stated that he believes it could be from medication he is taking or from contact with surfaces in his home where he used to smoke methamphetamine. Patient also completed a UA on 9/15/17 which was negative for all substances. Patient reports continued abstinence (Method 1). Patient does not appear to display any withdrawal symptoms.    Dimension #2 - Biomedical - Risk 1. Mild concern. Patient reports that his surgery recovery continues to go well and he is attending his PT appointments weekly (Method 1). Patient reports that he is continuing to smoke cigarettes. (Method 2). Patient reports compliance with medications related to surgery. Staff requested a list of medications and patient is in the process of providing this to writer.     Dimension #3 - Emotional/Behavioral/Cognitive - Risk 2, moderate concern.   Writer received patient's psychological evaluation from [facility] and patient is formally diagnosed with PTSD, Bipolar I disorder, and Unspecified anxiety disorder (Method 1). He also reports actively receiving case management services through [employer], however, has no reported any recent communication (Method 3). Patient identifies that his wife's death continues to be an event that impacts his mental health. He reports that he spoke with his past therapist through [facility] to resume service and has an appointment on 10/2/17 which was verified by writer (Method 4). Patient reports that he is dealing with difficult  emotions related to his daughter and barriers he is experiencing in spending time with her (Method 2).     Dimension #4 - Treatment Acceptance/Resistance - Risk 0, no concern.   Patient reports both internal and external motivation. He states he is motivated to maintain sobriety in order to regain custody of his daughter and also comply with probation. Patient communicated absence with staff and attended 3 groups this week (Method 1). Patient has identified that maintaining relationships with his children and grandchildren is a reasons for him to maintain abstinence. He also reports that he is experiencing happiness (Method 2).     Dimension #5 - Relapse Potential - Risk 2, moderate concern.   Patient denies any urges or cravings to use. He has a long history of substance use resulting in several treatment episodes. He appears to have some knowledge of addiction concepts and relapse prevention skills, however, has been unsuccessful in maintaining long term sobriety. Patient denied any cravings or urges to use this week. Patient reports that he has often used in the past to deal with difficult situations and emotions as well as to cope with his wife's death (Method 1). Patient identified that emotions have been a trigger for him in the past, specifically grief (Method 2).     Dimension #6 - Recovery Environment - Risk 3, serious concern.   Patient is not currently unemployed and seems to have minimal structure in his daily activities. He reports that his parents are supportive. He is currently involved with [facility] as well as having CPS involvement for his minor daughter and reports contact with his PO with his last meeting on 8/29/17. Patient reports that he has a court date for his CPS case in October. (Method 1). Patient has identified that his drug use has played a role in his legal issues (Method 2). Patient did not identify attending a support group meeting this week (Method 3).   T) Patient  educated on Relationships/Communcation. Patient has completed 43 of 90 hours of program at this time. Projected discharge date is 10/20/17. Current discharge plan is Relapse Prevention.       Jessi Garcia MA, LMFT, [facility]  9/29/2017, 4:01 PM     Weekly Educational Topics Date Education   1. Understanding Dual Diagnoses, week 1 8/14  Excused on 8/15, 8/16, 8/18 No kidding me 2   2. Understanding Dual Diagnoses, week 2 Excused 8/21-8/23, 8/25     3. Stress Management 8/28 8/29  Excused 8/30  Unexcused 9/1 GENARO Talk-Good Stress  GENARO Talk-Stress   4. Managing Difficult Feelings 9/5  Excused 9/6 9/8 Inside Out Clips   5. Managing Thinking Problems Excused 9/11  Unexcused 9/12  9/13  9/15    6. Building Recovery Support     7. Developing Assertive Communication Skills     8. Relapse Prevention     9. Relationships/Boundaries     10. Grief and Loss     11. Strengths     12. Wellness     Seeking Safety Unit every Tuesday     Mindfulness every Friday

## 2021-06-13 NOTE — PROGRESS NOTES
[facility]   Mental Health and Addiction Care   [facility], [facility], and [facility]    [phone] or [phone]   Treatment Plan    Patient  Name: Kayden Cortez  MRN: [id number]   Counselor: Jose Bowen MA, LMFT, [facility]    Title: Dimension 1 Withdrawal Potential, Risk level 0, no concerns  Plan Date: 10/20/2017  Problem: Patient reports last date of use as 6/27/17 and further reported 3 relapses for methamphetamines in the past 6 months. Patient had a positive UA on 9/13/17 for methamphetamine, UA on 9/15/2017 was negative.     Goal: Begin Date: 8/28/2017  Target Date: 11/17/2017  Maintain abstinence throughout treatment in order to avoid experiencing withdrawal symptoms and further your recovery.    Method 1: Begin Date:8/28/2017   Target Date: 11/17/2017  Stauts: Ongoing  Patient to refrain from any illicit substance or alcohol use, and report any substance/alcohol use to primary counselor to determine if any changes need to be made to the treatment plan to address withdrawal.       Title: Dimension 2 Biomedical condition, Risk level 1, mild concerns  Plan Date: 10/20/2017  Problem: Patient has multiple medical issues including current dx of CAD, chronic back issues and recent surgery to address torn tendons in arm. Patient has ability to seek medical care when needed and has a current PCP.    Goal: Begin Date: 8/28/2017  Target Date: 11/17/2017  To maintain stable physical health to participate in treatment services, while also making healthy changes to support ongoing recovery.     Method 1: Begin Date:8/28/2017   Target Date: 11/17/2017  Stauts: Ongoing  Patient to report any changes in physical health that would impact treatment participation.     Is Nicotine use indicated on the assessment? YES  Method 2: Begin Date: 8/28/2017  Target Date: 11/17/2017  Status: In progress   Staff to provide patient with nicotine cessation information and help on how to quit use. Patient to report  progress on stopping nicotine use to staff.       Title: Dimension 3, Emotional, behavioral, cognitive condition, Risk level 2, moderate concerns  Plan Date: 10/20/2017  Problem: Patient reports MH dx of Bipolar disorder, PTSD and ADHD. He has psychiatric and MH therapy services through [facility] and also has case management services through [employer].     Goal: Begin Date: 8/28/2017  Target Date: 11/17/2017  Patient to report better understanding of the relationship between mental health and substance use and be better able to cope with mental health symptoms.     Method 1: Begin Date: 8/28/2017  Target Date: 10/20/2017  Status: Met  Patient to meet with primary counselor or other  staff for the purpose of completed a diagnostic assessment, or sign release of information from current MH provider.    Method 2: Begin Date: 8/28/2017  Target Date: 11/17/2017  Status: Ongoing  Patient to begin using coping skills learned in MICD group (such as grounding, breathing, thought challenging, mindfulness, etc), and share in daily check-in any benefits or challenges that you experience using these skills.    Method 3: Begin Date: 9/15/2017  Target Date: 10/20/2017  Status: Ongoing  Kayden will maintain contact with his  through [employer].     Method 4: Begin Date: 9/15/2017  Target Date: 10/20/2017  Status: Met  Kayden will explore the benefit of re-engaging with  therapy and addressing grief and loss issues in therapy. Kayden will discuss issues related to grief and loss in both individual and group sessions and how it relates to his past drug use.     Method 5: Begin Date: 9/25/2017  Target Date: 11/17/2017  Status: In progress  Kayden will attend therapy appointment with Linwood at [facility] on 10/24/2017 and attend ongoing therapy appointments.       Title: Dimension 4, Treatment Acceptance/Resistance, Risk level 1, mild concerns  Plan Date: 10/20/2017  Problem: Patient reports  internal motivation to attend treatment and maintain sobriety, however, he has current legal involvement through [facility] that is also a motivating factor.     Goal: Begin Date: 8/28/2017  Target Date: 11/17/2017  Patient to identify motivation for abstinence and gain further insight into barriers that have prevented maintaining abstinence.     Method 1: Begin Date: 8/28/2017   Target Date: 11/17/2017  Status: Ongoing  Patient to attend all schedule MICD groups or 1:1s, and contact staff if unable to attend. Patient to attend 4x/weekly (Jessi: [phone])    Method 2: Begin Date: 8/28/2017  Target Date: 11/17/2017  Status: In progress  Patient to identify 5 reasons to maintain abstinence.       Title: Dimension 5, Relapse potential, Risk level 2, moderate concerns  Plan Date: 10/20/2017   Problem: Patient reports a history of multiple treatment attempts over the last 5 years and subsequent relapses. Patient appears to have some knowledge of addiction concepts and relapse prevention strategies.     Goal: Begin Date: 8/28/2017  Target Date: 11/17/2017  Further develop relapse prevention and coping skills to utilizing when experiencing situations that increased the likelihood of relapse.     Method 1: Begin Date: 8/28/2017  Target Date: 11/17/2017  Status: Ongoing  Patient to share in daily check-in any urges and addictive thinking to better understand his pattern of use and to prevent relapse in the future.     Method 2: Begin Date: 8/28/2017  Target Date: 11/17/2017  Status: In progress  Patient to meet with primary counselor to identify personal triggers for relapse.     Method 2: Begin Date: 10/20/2017  Target Date: 11/17/2017  Status: In progress  Patient will identify 10 coping strategies to address relapse risk.       Title: Dimension 6, Recovery Environment, Risk level 3, serious concerns  Plan Date: 10/20/2017  Problem: Patient is unemployed and has little daily structure. He is involved with  [facility]ation due to a drug possession charge and reports significant criminal history. He has current CPS involvement for his minor daughter who lives with her grandparents in [place].     Goal: Begin Date: 8/28/2017  Target Date: 11/17/2017  Explore and make lifestyle changes needed to maintain recovery free from substance use and legal problems.     Method 1: Begin Date: 8/28/2017  Target Date: 11/17/2017  Status: On going  Maintain contact with  (Farhat) and CPS worker and follow both case plans.     Method 2: Begin Date: 8/28/2017  Target Date: 11/17/2017  Status: In progress  Explore the connection between substance use and legal problems.     Method 3: Begin Date: 9/25/2017  Target Date: 11/17/2017  Status: In progress  Identify 2-3 support groups to attend weekly.       By signing this document, I am acknowledging that I was actively and directly involved in the development of my treatment plan.           Patient Signature:_____________________________ Date: _________________        Staff Signature:  Jessi Garcia MA, LMFT, [facility]      Date: 10/20/2017

## 2021-06-13 NOTE — PROGRESS NOTES
"INDIVIDUAL SESSION NOTE    D) Patient met 1:1 with counselor for an individual session lasting 35 minutes.     A) Writer discussed recent phone call with patient's PO and highlighted that his PO is encouraging him to remain engaged in MICD group and address his mental health issues. Writer encouraged patient to comply with probation requirements and case plan including providing all UAs from [employer]. Writer encouraged patient to be more involved in the sober community and look into changes that benefit his recovery environment.     R) Patient reported that he feels more motivated currently and in previous attempts as treatment has \"tried to work the system\". He stated that the loss of his youngest daughter has motivated him to take sobriety seriously this time and follow recommendations. Patient identified that his drug use after his wife's death was a way to self-medicate his difficult emotions as well as the PTSD symptoms he was experiencing. He reported that he is excited about resuming therapy with Linwood at [facility] and feels this will be beneficial to him in dealing with his wife's death. Patient reported that he may have a heard time in group next week as the topic is relationships.     T) Patient to continue attending group 4x weekly.       Patient treatment was reviewed. Changes were/ were not made. Patient was actively involved in the treatment plan review and updates.     Jessi Garcia MA, JOSEPHFT, [facility][note 3]  9/21/2017, 12:21 PM      "

## 2021-06-13 NOTE — PROGRESS NOTES
Weekly Progress Note  Kayden Cortez  1970  [id number]      D) Patient attended 2 group this week with 2 absences.   A) Staff facilitated groups and reviewed treatment progress. Assessed for [facility].   R) No VAP needed at this time. Pt working on the following dimensions:  Dimension #1 - Withdrawal Potential - Risk 1, mild concerns. Patient completed a UA on 9/13/17 which was positive for Methamphetamines. Patient denied methamphetamine use and stated that he believes it could be from medication he is taking or from contact with surfaces in his home where he used to smoke methamphetamine (Method 1). Patient reports that he is taking several medications from his tendon repair surgery and should have tested positive for Percocet, which did not appear on his results. Patient requested another UA and was given an order slip to take to the lab. He completed the UA and staff will review results with him and determine next steps.   Dimension #2 - Biomedical - Risk 1. Mild concern. Patient reports that his surgery recovery continues to go well and he is attending his PT appointments (Method 1). Patient reports that he is continuing to smoke cigarettes. (Method 2). Patient reports compliance with medications related to surgery and staff will ask him for a list of medications next week in order to have a current list in his chart.   Dimension #3 - Emotional/Behavioral/Cognitive - Risk 2, moderate concern. Patient reports MH diagnoses of bipolar disorder, PTSD and ADHD. He is prescribed psychotropic medications to manage and reports compliance. He identifies receiving mental health services through [facility] and records have been requested related to mental health services, although have not yet been received and staff has followed up several times (Method 1). He also reports actively receiving case management services through [employer], however, has no reported any recent communication (Method 3). Patient  "identifies that his wife's death continues to be an event that impacts his mental health (Method 4). He identified in group today that using wise mind would be helpful for him to be more mindful of how his emotions can impact his thoughts and behaviors. (Method 2).   Dimension #4 - Treatment Acceptance/Resistance - Risk 0, no concern. Patient reports both internal and external motivation. He states he is motivated to maintain sobriety in order to regain custody of his daughter and also comply with probation. Patient was gone on 9/11 and 9/12, however, did not communicate these absences and was reminded of this in group today (Method 1). Patient has identified that maintaining relationships with his children and grandchildren is a reasons for him to maintain abstinence (Method 2).   Dimension #5 - Relapse Potential - Risk 2, moderate concern. Patient denies any urges or cravings to use. He has a long history of substance use resulting in several treatment episodes. He appears to have some knowledge of addiction concepts and relapse prevention skills, however, has been unsuccessful in maintaining long term sobriety. Patient denied any cravings or urges to use this week. Patient reports that he has often used in the past to deal with difficult situations and emotions as well as to cope with his wife's death (Method 1). Patient identified that emotions have been a trigger for him in the past (Method 2). Patient denies current use despite positive UA on 9/13/17, risk rating will be re-assessed after results from second UA come back. When asked what his response will be if the second UA comes back positive he stated, \"Then I guess I will have lied to you\".   Dimension #6 - Recovery Environment - Risk 3, serious concern. Patient is not currently unemployed and seems to have minimal structure in his daily activities. He reports that his parents are supportive. [facility] well as " having CPS involvement for his minor daughter and reports contact with his PO and stated that he has attempted to speak with his CPS worker, however, has not be successful (Method 1). Patient has identified that his drug use has played a role in his legal issues (Method 2). He reported that if he uses again he will go to correction and as a result requested a second UA.  T) Patient educated on Thinking. Patient has completed 21 of 90 hours of program at this time. Projected discharge date is TBD. Current discharge plan is TBD.     Treatment plan was updated on 9/15/17 to reflect risk ratings change and additional goals. Patient will be given a copy on 9/18/17 and at that time the treatment plan will be reviewed.     Jessi Garcia MA, LMFT, [facility]  9/15/2017, 1:40 PM     Weekly Educational Topics Date Education   1. Understanding Dual Diagnoses, week 1 8/14  Excused on 8/15, 8/16, 8/18 No kidding me 2   2. Understanding Dual Diagnoses, week 2 Excused 8/21-8/23, 8/25     3. Stress Management 8/28 8/29  Excused 8/30  Unexcused 9/1 GEN[facility]O Talk-Good Stress  [facility]O Talk-Stress   4. Managing Difficult Feelings 9/5  Excused 9/6 9/8 Inside Out Clips   5. Managing Thinking Problems Excused 9/11  Unexcused 9/12  9/13  9/15    6. Building Recovery Support     7. Developing Assertive Communication Skills     8. Relapse Prevention     9. Relationships/Boundaries     10. Grief and Loss     11. Strengths     12. Wellness     Seeking Safety Unit every Tuesday     Mindfulness every Friday

## 2021-06-13 NOTE — PROGRESS NOTES
Weekly Progress Note  Kayden Cortez  1970  [id number]      D) Patient attended 1 group this week with 3 absences. Patient attended 0 individual sessions this week.   A) Staff facilitated groups and reviewed treatment progress. Assessed for [facility].   R) No VAP needed at this time. Pt working on the following dimensions:  Dimension #1 - Withdrawal Potential - Risk 0, no concerns.  Patient reports continued abstinence (Method 1). Patient does not appear to display any withdrawal symptoms.    Dimension #2 - Biomedical - Risk 1. Mild concern. Patient reports that his surgery recovery continues to go well and he is attending his PT appointments weekly (Method 1). Patient reports that he is continuing to smoke cigarettes. (Method 2). Patient reports compliance with medications related to surgery. Staff requested a list of medications and patient is in the process of providing this to writer. Staff will speak with patient about this list on 10/9/17.    Dimension #3 - Emotional/Behavioral/Cognitive - Risk 2, moderate concern.   Writer received patient's psychological evaluation from [facility] and patient is formally diagnosed with PTSD, Bipolar I disorder, and Unspecified anxiety disorder. He also reports actively receiving case management services through [employer], however, has no reported any recent communication (Method 3). Patient missed his appointment for MH therapy on 10/2/17 and was able to reschedule for 10/24/17 (Method 4). Patient reported that a family friend  at the [place] shooting and he has been experiencing difficult emotions related to that loss (Method 2).     Dimension #4 - Treatment Acceptance/Resistance - Risk 0, no concern.   Patient reports both internal and external motivation. He states he is motivated to maintain sobriety in order to regain custody of his daughter and also comply with probation. Patient communicated 1 of his absences with staff and attended 1 groups this week (Method  1).Patient reports that being sober helps him be the man he wants to be (Method 2).     Dimension #5 - Relapse Potential - Risk 2, moderate concern.   Patient denies any urges or cravings to use. He has a long history of substance use resulting in several treatment episodes. He appears to have some knowledge of addiction concepts and relapse prevention skills, however, has been unsuccessful in maintaining long term sobriety. Patient denied any cravings or urges to use this week (Method 1). Patient identified that emotions have been a trigger for him in the past, specifically grief and the loss of his wife (Method 2).     Dimension #6 - Recovery Environment - Risk 3, serious concern.   Patient is not currently unemployed and seems to have minimal structure in his daily activities. He reports that his parents are supportive. He is currently involved with [facility] Probation as well as having CPS involvement for his minor daughter and reports contact with his PO with his last meeting on 8/29/17. Patient reports that he has a court date for his CPS case in October that he plans to attend. (Method 1). Patient has identified that his drug use has played a role in his legal issues (Method 2). Patient did not identify attending a support group meeting this week, however, reported that he planned to attend one over the weekend (Method 3).   T) Patient educated on Addiction 101. Patient has completed 46 of 90 hours of program at this time. Projected discharge date is 10/20/17. Current discharge plan is Relapse Prevention.       Jessi Garcia MA, LMFT, [facility]  10/6/2017, 2:57 PM     Weekly Educational Topics Date Education   1. Understanding Dual Diagnoses, week 1 8/14  Excused on 8/15, 8/16, 8/18 No kidding me 2   2. Understanding Dual Diagnoses, week 2 Excused 8/21-8/23, 8/25     3. Stress Management 8/28 8/29  Excused 8/30  Unexcused 9/1 GENARO Talk-Good Stress  [facility]O Talk-Stress   4. Managing Difficult Feelings  9/5  Excused 9/6 9/8 Inside Out Clips   5. Managing Thinking Problems Excused 9/11  Unexcused 9/12  9/13  9/15    6. Building Recovery Support 9/18 9/19 9/20 9/22 Anonymous People   7. Relationships/Communication Excused 9/25 9/26 9/27 9/29    8. Addiction 101 Excused 10/2  Unexcused 10/3 and 10/4  10/6    9. Relapse Prevention     10. Grief and Loss     11. Strengths     12. Wellness     Seeking Safety Unit every Tuesday     Mindfulness every Friday

## 2021-06-13 NOTE — PROGRESS NOTES
[facility]   Mental Health and Addiction Care   [facility], [facility], and [employer] [facility] [employer]    [phone] or [phone]   Treatment Plan    Patient  Name: Kayden Cortez  MRN: [id number]   Counselor: Jose Bowen MA, LMFT, [facility]    Title: Dimension 1 Withdrawal Potential, Risk level 0, no concerns  Plan Date: 9/15/2017  Problem: Patient reports last date of use as 6/27/17 and further reported 3 relapses for methamphetamines in the past 6 months. Patient had a positive UA on 9/13/17 for methamphetamine, UA on 9/15/2017 was negative.     Goal: Begin Date: 8/28/2017  Target Date: 10/20/2017  Maintain abstinence throughout treatment in order to avoid experiencing withdrawal symptoms and further your recovery.    Method 1: Begin Date:8/28/2017   Target Date: 10/20/2017  Stauts: Ongoing  Patient to refrain from any illicit substance or alcohol use, and report any substance/alcohol use to primary counselor to determine if any changes need to be made to the treatment plan to address withdrawal.       Title: Dimension 2 Biomedical condition, Risk level 1, mild concerns  Plan Date: 9/25/2017  Problem: Patient has multiple medical issues including current dx of CAD, chronic back issues and recent surgery to address torn tendons in arm. Patient has ability to seek medical care when needed and has a current PCP.    Goal: Begin Date: 8/28/2017  Target Date: 10/20/2017  To maintain stable physical health to participate in treatment services, while also making healthy changes to support ongoing recovery.     Method 1: Begin Date:8/28/2017   Target Date: 10/20/2017  Stauts: Ongoing  Patient to report any changes in physical health that would impact treatment participation.     Is Nicotine use indicated on the assessment? YES  Method 2: Begin Date: 8/28/2017  Target Date: 10/20/2017  Status: In progress   Staff to provide patient with nicotine cessation information and help on how to quit use. Patient to report progress  on stopping nicotine use to staff.       Title: Dimension 3, Emotional, behavioral, cognitive condition, Risk level 2, moderate concerns  Plan Date: 9/25/2017  Problem: Patient reports MH dx of Bipolar disorder, PTSD and ADHD. He has psychiatric and MH therapy services through [facility] and also has case management services through [facility].     Goal: Begin Date: 8/28/2017  Target Date: 10/20/2017  Patient to report better understanding of the relationship between mental health and substance use and be better able to cope with mental health symptoms.     Method 1: Begin Date: 8/28/2017  Target Date: 10/20/2017  Status: Met  Patient to meet with primary counselor or other  staff for the purpose of completed a diagnostic assessment, or sign release of information from current MH provider.    Method 2: Begin Date: 8/28/2017  Target Date: 10/20/2017  Status: Ongoing  Patient to begin using coping skills learned in MICD group (such as grounding, breathing, thought challenging, mindfulness, etc), and share in daily check-in any benefits or challenges that you experience using these skills.    Method 3: Begin Date: 9/15/2017  Target Date: 10/20/2017  Status: Ongoing  Kayden will maintain contact with his  through [facility].     Method 4: Begin Date: 9/15/2017  Target Date: 10/20/2017  Status: Met  Kayden will explore the benefit of re-engaging with  therapy and addressing grief and loss issues in therapy. Kayden will discuss issues related to grief and loss in both individual and group sessions and how it relates to his past drug use.     Method 5: Begin Date: 9/25/2017  Target Date: 10/20/2017  Status: In progress  Kayden will attend therapy appointment with Linwood at [facility] on 10/2/2017 and attend ongoing therapy appointments.       Title: Dimension 4, Treatment Acceptance/Resistance, Risk level 0, no concerns  Plan Date: 9/25/2017  Problem: Patient reports internal  motivation to attend treatment and maintain sobriety, however, he has current legal involvement through [facility] that is also a motivating factor.     Goal: Begin Date: 8/28/2017  Target Date: 10/20/2017  Patient to identify motivation for abstinence and gain further insight into barriers that have prevented maintaining abstinence.     Method 1: Begin Date: 8/28/2017   Target Date: 10/20/2017  Status: Ongoing  Patient to attend all schedule MICD groups or 1:1s, and contact staff if unable to attend. Patient to attend 4x/weekly (Jessi: [phone])    Method 2: Begin Date: 8/28/2017  Target Date: 10/20/2017  Status: In progress  Patient to identify 5 reasons to maintain abstinence.       Title: Dimension 5, Relapse potential, Risk level 2, moderate concerns  Plan Date: 9/25/2017   Problem: Patient reports a history of multiple treatment attempts over the last 5 years and subsequent relapses. Patient appears to have some knowledge of addiction concepts and relapse prevention strategies.     Goal: Begin Date: 8/28/2017  Target Date: 10/20/2017  Further develop relapse prevention and coping skills to utilizing when experiencing situations that increased the likelihood of relapse.     Method 1: Begin Date: 8/28/2017  Target Date: 10/20/2017   Status: Ongoing  Patient to share in daily check-in any urges and addictive thinking to better understand his pattern of use and to prevent relapse in the future.     Method 2: Begin Date: 8/28/2017  Target Date: 10/20/2017   Status: In progress  Patient to meet with primary counselor to identify personal triggers for relapse.       Title: Dimension 6, Recovery Environment, Risk level 3, serious concerns  Plan Date: 9/25/2017  Problem: Patient is unemployed and has little daily structure. [facility] and reports significant criminal history. He has current CPS involvement for his minor daughter who  lives with her grandparents in [place].     Goal: Begin Date: 8/28/2017  Target Date: 10/20/2017  Explore and make lifestyle changes needed to maintain recovery free from substance use and legal problems.     Method 1: Begin Date: 8/28/2017  Target Date: 10/20/2017  Status: On going  Maintain contact with  (Farhat) and CPS worker and follow both case plans.     Method 2: Begin Date: 8/28/2017  Target Date: 10/20/2017  Status: In progress  Explore the connection between substance use and legal problems.     Method 3: Begin Date: 9/25/2017  Target Date: 10/20/2017  Status: In progress  Identify 2-3 support groups to attend weekly.     By signing this document, I am acknowledging that I was actively and directly involved in the development of my treatment plan.           Patient Signature:_____________________________ Date: _________________        Staff Signature:  Jessi Garcia MA, LMFT, [facility]      Date: 9/25/2017

## 2021-06-13 NOTE — PROGRESS NOTES
Weekly Progress Note  Kayden Cortez  1970  [id number]    Late entry from 10/27/17    D) Pt attended 2 groups  this week with 2 absences. Patient attended 0 individual sessions this week.    A) Staff facilitated groups and reviewed tx progress. [facility] for [facility].   R) No VAP needed at this time.     Any significant events, defines as events that impact patients relationship with others inside and outside of treatment: No  Indicate any changes or monitoring of physical or mental health problems: Yes: Patient is scheduled for a therapy session at [facility] on 11/6/2017. He reports status of mental health symptoms during each MICD group session.   Indicate involvement by any outside supports: Yes: Patient has been attending support group meetings.   IAPP reviewed and modified as needed: Yes    Pt working on the following dimensions:  Dimension #1 - Withdrawal Potential - Risk 0. Patient reports LDU as 6/27/17. He had a positive UA on 9/13/17 for methamphetamine and denied use. He has had two subsequent UA's which have been negative for all substances.   Specific goals from treatment plan addressed this week:  Patient reports continued abstinence.   Effectiveness of strategies:  Strategies are still effective.   Dimension #2 - Biomedical - Risk 1. Patient has several medical issues including current dx of CAD, chronic back pain, and a recent surgery to repair torn tendons in his arm. Patient has the ability to seek medical care when needed and has a current PCP.   Specific goals from treatment plan addressed this week:  Patient reports good physical health at this time.   Effectiveness of strategies:  Strategies are still effective.   Dimension #3 - Emotional/Behavioral/Cognitive - Risk 2. Patient is diagnosed with PTSD, Unspecified anxiety disorder, and Bipolar I disorder. He previously received both therapy and psychiatry services through [facility]. He is scheduled for a therapy  appointment with BELINDA Conde at [facility] for a follow up appointment as he previously saw Linwood for therapy. Patient reports stable mental health symptoms. He appears anxious in group often evidenced by his bouncing legs, specifically, during mindfulness exercises.   Active interventions to stabilize mental health symptoms:  Patient has a follow up therapy appointment scheduled on 11/6/17. He reports status of mental health symptoms daily in group.  Specific goals from treatment plan addressed this week:  Patient reports stable mental health this week. He identifies some frustration about his car and stated that he was stressed out when his car became stuck in his friend's driveway, however, reported he was able to manage his stress. He also reported that the mindfulness exercise practiced in group was effective for him.   Effectiveness of strategies:  Strategies are still effective.   Dimension #4 - Treatment Acceptance/Resistance - Risk 1. Patient reports internal motivation to attend treatment and maintain sobriety, however, he has current legal involvement through [facility] that is also a motivating factor. Patient reports commitment to attend and complete treatment.   Specific goals from treatment plan addressed this week:  Patient has attended 2/4 groups this week. He communicated absences with writer. He reports motivation to complete treatment and maintain changes.   Effectiveness of strategies:  Strategies are still effective.   Dimension #5 - Relapse Potential - Risk 2. Patient reports a history of multiple treatment attempts over the last 5 years and subsequent relapses. Patient appears to have some knowledge of addiction concepts and relapse prevention strategies. Patient also appears to be resistant to the idea of distancing himself from using friends and states that being around people who use gives him strength. Patient identifies that he has used substances in the past to deal with  difficult emotions and situations as well as to manage mental health symptoms.   Specific goals from treatment plan addressed this week:  Patient denies urges or cravings to use. Patient identifies that addressing his mental health is important to maintain his sobriety.   Effectiveness of strategies:  Strategies are still effective.   Dimension #6 - Recovery Environment - Risk 3. Patient is unemployed and has little daily structure. He is involved with [facility] due to a drug possession charge and reports significant criminal history. He has current CPS involvement for his minor daughter who lives with her grandparents in [place]. Patient has limited sober support at this time and identifies that he needs expand his sober support network, however, appears to continue to maintain contact with using people.   Specific goals from treatment plan addressed this week:  Patient continues to reports attending meetings weekly. He did not indicate if he attended Sikh. He reports maintaining contact with his  and recognizes that he needs to remain sober in order to comply with his case plan.   Effectiveness of strategies:  Strategies are still effective.   T) Treatment plan updated: no.  Patient notified and in agreement: NA.  Patient educated on Wellness. Patient has completed 71 of 90 program hours at this time. Projected discharge date is 12/1/2017. Current discharge plan is Relapse Prevention.     Jose Bowen  11/3/2017, 1:26 PM       Weekly Educational Topics Date Education   1. Understanding Dual Diagnoses, week 1 8/14  Excused on 8/15, 8/16, 8/18 No kidding me 2   2. Understanding Dual Diagnoses, week 2 Excused 8/21-8/23, 8/25     3. Stress Management 8/28 8/29  Excused 8/30  Unexcused 9/1 GENARO Talk-Good Stress  GENARO Talk-Stress   4. Managing Difficult Feelings 9/5  Excused 9/6 9/8 Inside Out Clips   5. Managing Thinking Problems Excused 9/11  Unexcused 9/12  9/13  9/15    6. Building  Recovery Support 9/18 9/19 9/20 9/22 Anonymous People   7. Relationships/Communication Excused 9/25 9/26 9/27 9/29    8. Addiction 101 Excused 10/2  Unexcused 10/3 and 10/4  10/6    9. Relapse Prevention Excused 10/9  10/10  Unexcused on 10/11 and 10/13 30 for 30: Unguarded   10. Grief and Loss 10/16  Excused 10/17  10/18  10/20 GENARO Vanegas: Humor in Treatment   11. Strengths 10/23  10/24  Excused 10/25  10/27    12. Wellness 10/30  Excused 10/31  11/1  Excused 11/3    Seeking Safety Unit every Tuesday     Mindfulness every Friday

## 2021-06-13 NOTE — PROGRESS NOTES
Weekly Progress Note  Kayden Cortez  1970  [id number]      D) Patient attended 4 group this week with 0 absences. Patient attended one individual session this week.   A) Staff facilitated groups and reviewed treatment progress. [facility] for [facility].   R) No VAP needed at this time. Pt working on the following dimensions:  Dimension #1 - Withdrawal Potential - Risk 1, mild concerns. Patient completed a UA on 9/13/17 which was positive for Methamphetamines. Patient denied methamphetamine use and stated that he believes it could be from medication he is taking or from contact with surfaces in his home where he used to smoke methamphetamine. Patient also completed a UA on 9/15/17 which was negative for all substances (Method 1). Patient reports that he is taking several medications from his tendon repair surgery and should have tested positive for Percocet, which did not appear on his results on either UA. Patient continues to deny use. Patient does not appear to display any withdrawal symptoms.    Dimension #2 - Biomedical - Risk 1. Mild concern. Patient reports that his surgery recovery continues to go well and he is attending his PT appointments weekly (Method 1). Patient reports that he is continuing to smoke cigarettes. (Method 2). Patient reports compliance with medications related to surgery. Staff requested a list of medications and patient is in the process of providing this to writer.     Dimension #3 - Emotional/Behavioral/Cognitive - Risk 2, moderate concern.   Writer received patient's psychological evaluation from [facility] and patient is formally diagnosed with PTSD, Bipolar I disorder, and Unspecified anxiety disorder (Method 1). He also reports actively receiving case management services through [employer], however, has no reported any recent communication (Method 3). Patient identifies that his wife's death continues to be an event that impacts his mental health. He reports that he spoke with his  "past therapist through [facility] to resume service and has an appointment on 10/2/17 which was verified by writer (Method 4). Patient reports that he has been more in touch with his feelings recently (Method 2).     Dimension #4 - Treatment Acceptance/Resistance - Risk 0, no concern.   Patient reports both internal and external motivation. He states he is motivated to maintain sobriety in order to regain custody of his daughter and also comply with probation. Patient attended all group sessions this week and was engaged (Method 1). Patient has identified that maintaining relationships with his children and grandchildren is a reasons for him to maintain abstinence. He also reports that he is experiencing happiness (Method 2).     Dimension #5 - Relapse Potential - Risk 2, moderate concern.   Patient denies any urges or cravings to use. He has a long history of substance use resulting in several treatment episodes. He appears to have some knowledge of addiction concepts and relapse prevention skills, however, has been unsuccessful in maintaining long term sobriety. Patient denied any cravings or urges to use this week. Patient reports that he has often used in the past to deal with difficult situations and emotions as well as to cope with his wife's death (Method 1). Patient identified that emotions have been a trigger for him in the past (Method 2). Patient denies current use despite positive UA on 9/13/17, risk rating will be re-assessed after results from second UA come back. When asked what his response will be if the second UA comes back positive he stated, \"Then I guess I will have lied to you\". Patient's UA from 9/15/17 was negative for all substances. Patient reports that he is still in contact with using friends, however, has removed himself from high risk situations and people when necessary (Method 1).     Dimension #6 - Recovery Environment - Risk 3, serious concern.   Patient is not currently unemployed " and seems to have minimal structure in his daily activities. He reports that his parents are supportive. He is currently involved with [facility] as well as having CPS involvement for his minor daughter and reports contact with his PO with his last meeting on 8/29/17. Patient reports that he has a court date for his CPS case in October. (Method 1). Patient has identified that his drug use has played a role in his legal issues (Method 2). He reported that if he uses again he will go to intermediate and as a result requested a second UA, which was negative for all substances.   T) Patient educated on Building Recovery Support. Patient has completed 34 of 90 hours of program at this time. Projected discharge date is 10/20/17. Current discharge plan is Relapse Prevention.       Jessi Garcia MA, LMFT, [facility]  9/22/2017, 2:22 PM     Weekly Educational Topics Date Education   1. Understanding Dual Diagnoses, week 1 8/14  Excused on 8/15, 8/16, 8/18 No kidding me 2   2. Understanding Dual Diagnoses, week 2 Excused 8/21-8/23, 8/25     3. Stress Management 8/28 8/29  Excused 8/30  Unexcused 9/1 GENARO Talk-Good Stress  GENARO Talk-Stress   4. Managing Difficult Feelings 9/5  Excused 9/6 9/8 Inside Out Clips   5. Managing Thinking Problems Excused 9/11  Unexcused 9/12  9/13  9/15    6. Building Recovery Support     7. Developing Assertive Communication Skills     8. Relapse Prevention     9. Relationships/Boundaries     10. Grief and Loss     11. Strengths     12. Wellness     Seeking Safety Unit every Tuesday     Mindfulness every Friday
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
## 2021-06-13 NOTE — PROGRESS NOTES
Kayden Cortez attended 3 hours of group therapy today. Patient reviewed treatment plan, signed and received a copy.     9/18/2017 3:24 PM Jose Bowen
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
## 2021-06-13 NOTE — PROGRESS NOTES
[facility]   Mental Health and Addiction Care   [facility], [facility], and [facility]    [phone] or [phone]   Treatment Plan    Patient  Name: Kayden Cortez  MRN: [id number]   Counselor: Jose Bowen MA, LMFT, [facility]    Title: Dimension 1 Withdrawal Potential, Risk level 0, no concerns  Plan Date: 9/15/2017  Problem: Patient reports last date of use as 6/27/17 and further reported 3 relapses for methamphetamines in the past 6 months. Patient had a positive UA on 9/13/17 for methamphetamine, UA on 9/15/2017 was negative.     Goal: Begin Date: 8/28/2017  Target Date: 10/20/2017  Maintain abstinence throughout treatment in order to avoid experiencing withdrawal symptoms and further your recovery.    Method 1: Begin Date:8/28/2017   Target Date: 10/20/2017  Stauts: Ongoing  Patient to refrain from any illicit substance or alcohol use, and report any substance/alcohol use to primary counselor to determine if any changes need to be made to the treatment plan to address withdrawal.       Title: Dimension 2 Biomedical condition, Risk level 1, mild concerns  Plan Date: 10/13/2017  Problem: Patient has multiple medical issues including current dx of CAD, chronic back issues and recent surgery to address torn tendons in arm. Patient has ability to seek medical care when needed and has a current PCP.    Goal: Begin Date: 8/28/2017  Target Date: 10/20/2017  To maintain stable physical health to participate in treatment services, while also making healthy changes to support ongoing recovery.     Method 1: Begin Date:8/28/2017   Target Date: 10/20/2017  Stauts: Ongoing  Patient to report any changes in physical health that would impact treatment participation.     Is Nicotine use indicated on the assessment? YES  Method 2: Begin Date: 8/28/2017  Target Date: 10/20/2017  Status: In progress   Staff to provide patient with nicotine cessation information and help on how to quit use. Patient to report progress  on stopping nicotine use to staff.       Title: Dimension 3, Emotional, behavioral, cognitive condition, Risk level 2, moderate concerns  Plan Date: 10/13/2017  Problem: Patient reports MH dx of Bipolar disorder, PTSD and ADHD. He has psychiatric and MH therapy services through [facility] and also has case management services through [facility].     Goal: Begin Date: 8/28/2017  Target Date: 10/20/2017  Patient to report better understanding of the relationship between mental health and substance use and be better able to cope with mental health symptoms.     Method 1: Begin Date: 8/28/2017  Target Date: 10/20/2017  Status: Met  Patient to meet with primary counselor or other  staff for the purpose of completed a diagnostic assessment, or sign release of information from current MH provider.    Method 2: Begin Date: 8/28/2017  Target Date: 10/20/2017  Status: Ongoing  Patient to begin using coping skills learned in MICD group (such as grounding, breathing, thought challenging, mindfulness, etc), and share in daily check-in any benefits or challenges that you experience using these skills.    Method 3: Begin Date: 9/15/2017  Target Date: 10/20/2017  Status: Ongoing  Kayden will maintain contact with his  through [facility].     Method 4: Begin Date: 9/15/2017  Target Date: 10/20/2017  Status: Met  Kayden will explore the benefit of re-engaging with  therapy and addressing grief and loss issues in therapy. Kayden will discuss issues related to grief and loss in both individual and group sessions and how it relates to his past drug use.     Method 5: Begin Date: 9/25/2017  Target Date: 10/20/2017  Status: In progress  Kayden will attend therapy appointment with Linwood at [facility] on 10/2/2017 and attend ongoing therapy appointments.       Title: Dimension 4, Treatment Acceptance/Resistance, Risk level 1, mild concerns  Plan Date: 10/13/2017  Problem: Patient reports internal  motivation to attend treatment and maintain sobriety, however, he has current legal involvement through [facility] that is also a motivating factor.     Goal: Begin Date: 8/28/2017  Target Date: 10/20/2017  Patient to identify motivation for abstinence and gain further insight into barriers that have prevented maintaining abstinence.     Method 1: Begin Date: 8/28/2017   Target Date: 10/20/2017  Status: Ongoing  Patient to attend all schedule MICD groups or 1:1s, and contact staff if unable to attend. Patient to attend 4x/weekly (Jessi: [phone])    Method 2: Begin Date: 8/28/2017  Target Date: 10/20/2017  Status: In progress  Patient to identify 5 reasons to maintain abstinence.       Title: Dimension 5, Relapse potential, Risk level 2, moderate concerns  Plan Date: 10/13/2017   Problem: Patient reports a history of multiple treatment attempts over the last 5 years and subsequent relapses. Patient appears to have some knowledge of addiction concepts and relapse prevention strategies.     Goal: Begin Date: 8/28/2017  Target Date: 10/20/2017  Further develop relapse prevention and coping skills to utilizing when experiencing situations that increased the likelihood of relapse.     Method 1: Begin Date: 8/28/2017  Target Date: 10/20/2017   Status: Ongoing  Patient to share in daily check-in any urges and addictive thinking to better understand his pattern of use and to prevent relapse in the future.     Method 2: Begin Date: 8/28/2017  Target Date: 10/20/2017   Status: In progress  Patient to meet with primary counselor to identify personal triggers for relapse.       Title: Dimension 6, Recovery Environment, Risk level 3, serious concerns  Plan Date: 10/13/2017  Problem: Patient is unemployed and has little daily structure. He is involved with [facility] due to a drug possession charge and reports significant criminal history. He has current CPS involvement for his minor daughter who  lives with her grandparents in [place].     Goal: Begin Date: 8/28/2017  Target Date: 10/20/2017  Explore and make lifestyle changes needed to maintain recovery free from substance use and legal problems.     Method 1: Begin Date: 8/28/2017  Target Date: 10/20/2017  Status: On going  Maintain contact with  (Farhat) and CPS worker and follow both case plans.     Method 2: Begin Date: 8/28/2017  Target Date: 10/20/2017  Status: In progress  Explore the connection between substance use and legal problems.     Method 3: Begin Date: 9/25/2017  Target Date: 10/20/2017  Status: In progress  Identify 2-3 support groups to attend weekly.     By signing this document, I am acknowledging that I was actively and directly involved in the development of my treatment plan.           Patient Signature:_____________________________ Date: _________________        Staff Signature:  Jessi Garcia MA, LMFT, [facility]      Date: 10/13/2017

## 2021-06-13 NOTE — PROGRESS NOTES
Weekly Progress Note  Kayden Cortez  1970  [id number]      D) Pt attended 1 groups  this week with3 absences. Patient attended 0 individual sessions this week.   A) Staff facilitated groups and reviewed tx progress. Assessed for [facility].  R) No VAP needed at this time. Pt working on the following dimensions:  Dimension #1 - Withdrawal Potential - Risk 0. Patient reports last date of use as 6/27/17 and further reported 3 relapses for methamphetamines in the past 6 months. Patient had a positive UA on 9/13/17 for methamphetamine, UA on 9/15/2017 was negative. .  Specific goals from treatment plan addressed this week:  Patient reports abstinence this week. He denied urges or cravings to use.   Effectiveness of strategies:  Strategies are still effective.     Dimension #2 - Biomedical - Risk 1. Patient has multiple medical issues including current dx of CAD, chronic back issues and recent surgery to address torn tendons in arm. Patient has ability to seek medical care when needed and has a current PCP.   Specific goals from treatment plan addressed this week:  Patient reported that he lifted a chair on his own, however, did not experience any issues with the arm he recently had surgery on.   Effectiveness of strategies:  Strategies are still effective.     Dimension #3 - Emotional/Behavioral/Cognitive - Risk 2. Patient reports  dx of Bipolar disorder, PTSD and ADHD. He previous had therapy and psychiatry services through [facility], however, has decided not to re-engage with them. He has a therapy intake appointment scheduled with BELINDA Conde at [facility] on 10/24/17. Patient previously had case management services through [facility], however, it sounds as though this case has been closed due to patient lack of engagement.   Specific goals from treatment plan addressed this week:  Patient has an upcoming therapy intake appointment on 10/24/17. Patient identified that he has been  feeling irritable lately, however, reports stable mental health otherwise.   Effectiveness of strategies:  Strategies are still effective.     Dimension #4 - Treatment Acceptance/Resistance - Risk 1. Patient reports internal motivation to attend treatment and maintain sobriety, however, he has current legal involvement through [facility] that is also a motivating factor. Patient's attendance has been an issues lately and appears to be minimally committed. When patient does attend group he is active and engaged.   Specific goals from treatment plan addressed this week:  Patient has attended group 1/4x this week. He communicated 1/3 absences with writer. He also did not attend his 1:1 session scheduled with writer.   Effectiveness of strategies  Strategies are not effective at this time. Writer will be discussing pattern of absences with patient via phone call on 10/13/17 and will express to patient that he needs to show he is committed to the treatment process and will be at risk for discharge with another unexcused absence.     Dimension #5 - Relapse Potential - Risk 2. Patient reports a history of multiple treatment attempts over the last 5 years and subsequent relapses. Patient appears to have some knowledge of addiction concepts and relapse prevention strategies. Patient also appears to be resistant to the idea of distancing himself from using friends and states that being around people who use gives him strength.   Specific goals from treatment plan addressed this week:  Patient denied any urges to use this week. He reported that he needs to pay attention to his emotions as they are a trigger for relapse. He identified that he does not feel that hanging out with using friends is an issue for him.   Effectiveness of strategies:  Strategies are still effective.     Dimension #6 - Recovery Environment - Risk 3. Patient is unemployed and has little daily structure. He is involved with [facility]  Probation due to a drug possession charge and reports significant criminal history. He has current CPS involvement for his minor daughter who lives with her grandparents in [place].   Specific goals from treatment plan addressed this week:  Patient reported that he has a meeting with his PO this week. He reported that he has been asked to cook for a birthday party for his friends, which he is excited about. Patient did not report any support group meeting attendance.   Effectiveness of strategies:  Strategies are effective.    T) Treatment plan updated yes.  Patient notified and in agreement Yes.  Patient educated on Relapse Prevention. Patient has completed 49 of 90 program hours at this time. Projected discharge date is TBD. Current discharge plan isTBD.     Jose Bowen  10/13/2017, 2:25 PM       Weekly Educational Topics Date Education   1. Understanding Dual Diagnoses, week 1 8/14  Excused on 8/15, 8/16, 8/18 No kidding me 2   2. Understanding Dual Diagnoses, week 2 Excused 8/21-8/23, 8/25     3. Stress Management 8/28 8/29  Excused 8/30  Unexcused 9/1 GENARO Talk-Good Stress  GENARO Talk-Stress   4. Managing Difficult Feelings 9/5  Excused 9/6 9/8 Inside Out Clips   5. Managing Thinking Problems Excused 9/11  Unexcused 9/12  9/13  9/15    6. Building Recovery Support 9/18 9/19 9/20 9/22 Anonymous People   7. Relationships/Communication Excused 9/25 9/26 9/27 9/29    8. Addiction 101 Excused 10/2  Unexcused 10/3 and 10/4  10/6    9. Relapse Prevention Excused 10/9  10/10  Unexcused on 10/11 and 10/13 30 for 30: Unguarded   10. Grief and Loss     11. Strengths     12. Wellness     Seeking Safety Unit every Tuesday     Mindfulness every Friday

## 2021-06-13 NOTE — PROGRESS NOTES
[facility]   Mental Health and Addiction Care   [facility], and [facility]    [phone] or [phone]   Treatment Plan    Patient  Name: Kayden Cortez  MRN: [id number]   Counselor: Jose Bowen MA, LMFT, [facility]    Title: Dimension 1 Withdrawal Potential, Risk level 1, no concerns  Plan Date: 9/15/2017  Problem: Patient reports last date of use as 6/27/17 and further reported 3 relapses for methamphetamines in the past 6 months. Patient had a positive UA on 9/14/17 for methamphetamine.     Goal: Begin Date: 8/28/2017  Target Date: 10/20/2017  Maintain abstinence throughout treatment in order to avoid experiencing withdrawal symptoms and further your recovery.    Method 1: Begin Date:8/28/2017   Target Date: 10/20/2017  Stauts: Ongoing  Patient to refrain from any illicit substance or alcohol use, and report any substance/alcohol use to primary counselor to determine if any changes need to be made to the treatment plan to address withdrawal.       Title: Dimension 2 Biomedical condition, Risk level 1, mild concerns  Plan Date: 9/15/2017  Problem: Patient has multiple medical issues including current dx of CAD, chronic back issues and recent surgery to address torn tendons in arm. Patient has ability to seek medical care when needed and has a current PCP.    Goal: Begin Date: 8/28/2017  Target Date: 10/20/2017  To maintain stable physical health to participate in treatment services, while also making healthy changes to support ongoing recovery.     Method 1: Begin Date:8/28/2017   Target Date: 10/20/2017  Stauts: Ongoing  Patient to report any changes in physical health that would impact treatment participation.     Is Nicotine use indicated on the assessment? YES  Method 2: Begin Date: 8/28/2017  Target Date: 10/20/2017  Status: In progress   Staff to provide patient with nicotine cessation information and help on how to quit use. Patient to report progress on stopping nicotine use to  staff.       Title: Dimension 3, Emotional, behavioral, cognitive condition, Risk level 2, moderate concerns  Plan Date: 9/15/2017  Problem: Patient reports MH dx of Bipolar disorder, PTSD and ADHD. He has psychiatric and MH therapy services through [facility] and also has case management services through [employer].     Goal: Begin Date: 8/28/2017  Target Date: 10/20/2017  Patient to report better understanding of the relationship between mental health and substance use and be better able to cope with mental health symptoms.     Method 1: Begin Date: 8/28/2017  Target Date: 10/20/2017  Status: In progress  Patient to meet with primary counselor or other  staff for the purpose of completed a diagnostic assessment, or sign release of information from current MH provider.    Method 2: Begin Date: 8/28/2017  Target Date: 10/20/2017  Status: Ongoing  Patient to begin using coping skills learned in MICD group (such as grounding, breathing, thought challenging, mindfulness, etc), and share in daily check-in any benefits or challenges that you experience using these skills.    Method 3: Begin Date: 9/15/2017  Target Date: 10/20/2017  Status: Ongoing  Kayden will maintain contact with his  through [employer].     Method 4: Begin Date: 9/15/2017  Target Date: 10/20/2017  Status: In progress  Kayden will explore the benefit of re-engaging with MH therapy and addressing grief and loss issues in therapy. Kayden will discuss issues related to grief and loss in both individual and group sessions and how it relates to his past drug use.       Title: Dimension 4, Treatment Acceptance/Resistance, Risk level 0, no concerns  Plan Date: 9/15/2017  Problem: Patient reports internal motivation to attend treatment and maintain sobriety, however, he has current legal involvement through [facility] that is also a motivating factor.     Goal: Begin Date: 8/28/2017  Target Date:  10/20/2017  Patient to identify motivation for abstinence and gain further insight into barriers that have prevented maintaining abstinence.     Method 1: Begin Date: 8/28/2017   Target Date: 10/20/2017  Status: Ongoing  Patient to attend all schedule MICD groups or 1:1s, and contact staff if unable to attend. (Jessi: [phone])    Method 2: Begin Date: 9/15/2017  Target Date: 10/20/2017  Status: In progress  Patient to identify 5 reasons to maintain abstinence.       Title: Dimension 5, Relapse potential, Risk level 2, moderate concerns  Plan Date: 9/15/2017   Problem: Patient reports a history of multiple treatment attempts over the last 5 years and subsequent relapses. Patient appears to have some knowledge of addiction concepts and relapse prevention strategies.     Goal: Begin Date: 8/28/2017  Target Date: 10/20/2017  Further develop relapse prevention and coping skills to utilizing when experiencing situations that increased the likelihood of relapse.     Method 1: Begin Date: 8/28/2017  Target Date: 10/20/2017   Status: Ongoing  Patient to share in daily check-in any urges and addictive thinking to better understand his pattern of use and to prevent relapse in the future.     Method 2: Begin Date: 8/28/2017  Target Date: 10/20/2017   Status: In progress  Patient to meet with primary counselor to identify personal triggers for relapse.       Title: Dimension 6, Recovery Environment, Risk level 3, serious concerns  Plan Date: 9/15/2017  Problem: Patient is unemployed and has little daily structure. [facility] due to a drug possession charge and reports significant criminal history. He has current CPS involvement for his minor daughter who lives with her grandparents in [place].     Goal: Begin Date: 8/28/2017  Target Date: 10/20/2017  Explore and make lifestyle changes needed to maintain recovery free from substance use and legal problems.     Method 1: Begin Date:  8/28/2017  Target Date: 10/20/2017  Status: On going  Maintain contact with  and CPS worker and follow both case plans.     Method 2: Begin Date: 8/28/2017  Target Date: 10/20/2017  Status: In progress  Explore the connection between substance use and legal problems.         By signing this document, I am acknowledging that I was actively and directly involved in the development of my treatment plan.           Patient Signature:_____________________________ Date: ______        Staff Signature:  Jessi Garcia MA, LMFT, [facility]      Date: 8/28/2017

## 2021-06-14 NOTE — PROGRESS NOTES
Weekly Progress Note  Kayden Cortez  1970  [id number]      D) Pt attended 1 groups  this week with 0 absences. Patient attended 0 individual sessions this week.    A) Staff facilitated groups and reviewed tx progress. Assessed for [facility].   R) No VAP needed at this time.     Any significant events, defines as events that impact patients relationship with others inside and outside of treatment: Patient received a phone call on 17 that his sister  of a heart attack.   Indicate any changes or monitoring of physical or mental health problems: Yes: Patient has scheduled individual therapy appointment on 17. He reports status of mental health symptoms during each MICD group session.   Indicate involvement by any outside supports: Yes: Patient has been attending support group meetings.   IAPP reviewed and modified as needed: NA    Pt working on the following dimensions:  Dimension #1 - Withdrawal Potential - Risk 0. Patient reports LDU as 17. He had a positive UA on 17 for methamphetamine and denied use. He has had subsequent UA's which have been negative for all substances.   Specific goals from treatment plan addressed this week:  Patient reports continued abstinence.   Effectiveness of strategies:  Strategies are still effective.   Dimension #2 - Biomedical - Risk 1. Patient has several medical issues including current dx of CAD, chronic back pain, and a recent surgery to repair torn tendons in his arm. Patient has the ability to seek medical care when needed and has a current PCP.   Specific goals from treatment plan addressed this week:  Patient reports good physical health at this time.   Effectiveness of strategies:  Strategies are still effective.   Dimension #3 - Emotional/Behavioral/Cognitive - Risk 2. Patient is diagnosed with PTSD, Unspecified anxiety disorder, and Bipolar I disorder. He previously received both therapy and psychiatry services through [facility]. He is scheduled for a therapy appointment with BELINDA Conde at [facility] for a follow up appointment as he previously saw [facility] for therapy, however, he has not attended an appointment yet due to several cancellations on both patient and therapist's part, current appointment is scheduled for 17. Patient reports stable mental health symptoms. He appears anxious in group often evidenced by his bouncing legs, specifically, during mindfulness exercises. Patient reports his sister  of a heart attack on 17 as patient received a phone call during group informing him.   Active interventions to stabilize mental health symptoms:  He reports status of mental health symptoms daily in group.  Specific goals from treatment plan addressed this week:  Patient reports feeling overwhelmed this week. He reported that he has been speaking with his nieces and nephews offering support to them after their mother's passing. He continues to report experiencing difficult emotions related to his sister's death.   Effectiveness of strategies:  Strategies are still effective.   Dimension #4 - Treatment Acceptance/Resistance - Risk 1. Patient reports internal motivation to attend treatment and maintain sobriety, however, he has current legal involvement through [facility] that is also a motivating factor. Patient reports commitment to attend and complete treatment.   Specific goals from treatment plan addressed this week:  Patient has attended 1/1 groups this week. He reports motivation to complete treatment and maintain changes. Patient was passively engaged in group this week.   Effectiveness of strategies:  Strategies are still effective.   Dimension #5 - Relapse Potential - Risk 2. Patient reports a history of multiple treatment attempts over the last 5 years and subsequent relapses. Patient appears to have some knowledge of addiction concepts and relapse prevention strategies. Patient also appears to  be resistant to the idea of distancing himself from using friends and states that being around people who use gives him strength in his determination to be sober. Patient identifies that he has used substances in the past to deal with difficult emotions and situations as well as to manage mental health symptoms. Patient also appears to be in an intimate relationship with a woman who is using substances.   Specific goals from treatment plan addressed this week:  Patient reports experiencing urges to use and was able to identify that this may be due to the difficult emotions he is experiencing due to his sister's recent death. He denies any substance use. Patient identifies that addressing his mental health is important to maintain his sobriety.   Effectiveness of strategies:  Strategies are still effective.   Dimension #6 - Recovery Environment - Risk 3. Patient is unemployed and has little daily structure. He is involved with [facility] due to a drug possession charge and reports significant criminal history. He has current CPS involvement for his minor daughter who lives with her grandparents in [place]. Patient has limited sober support at this time and identifies that he needs expand his sober support network, however, appears to continue to maintain contact with using people.   Specific goals from treatment plan addressed this week:  Patient continues to report attending support group meetings weekly. He reports maintaining contact with his  and recognizes that he needs to remain sober in order to comply with his case plan. Patient reports that he has been in increased contact with his family due to his sister's recent death.   Effectiveness of strategies:  Strategies are still effective.   T) Treatment plan updated: no.  Patient notified and in agreement: NA.  Patient educated on Dual Diagnosis. Patient has completed 83 of 90 program hours at this time. Projected discharge date is  12/1/2017. Current discharge plan is Relapse Prevention.     Jose PHIL PedersonJessi  11/22/2017, 11:11 AM       Weekly Educational Topics Date Education   1. Understanding Dual Diagnoses, week 1 8/14  Excused on 8/15, 8/16, 8/18 11/6 11/7  Excused 11/8 and 11/10 No kidding me 2   2. Understanding Dual Diagnoses, week 2 Excused 8/21-8/23, 8/25 11/13-11/15  Unexcused 11/17    3. Stress Management 8/28 8/29  Excused 8/30  Unexcused 9/1 11/20 GENARO Talk-Good Stress  GENARO Talk-Stress   4. Managing Difficult Feelings 9/5  Excused 9/6 9/8 Inside Out Clips   5. Managing Thinking Problems Excused 9/11  Unexcused 9/12  9/13  9/15    6. Building Recovery Support 9/18 9/19 9/20 9/22 Anonymous People   7. Relationships/Communication Excused 9/25 9/26 9/27 9/29    8. Addiction 101 Excused 10/2  Unexcused 10/3 and 10/4  10/6    9. Relapse Prevention Excused 10/9  10/10  Unexcused on 10/11 and 10/13 30 for 30: Unguarded   10. Grief and Loss 10/16  Excused 10/17  10/18  10/20 GENARO Talk  Aly Vanegas: Humor in Treatment   11. Strengths 10/23  10/24  Excused 10/25  10/27    12. Wellness 10/30  Excused 10/31  11/1  Excused 11/3    Seeking Safety Unit every Tuesday     Mindfulness every Friday

## 2021-06-14 NOTE — PROGRESS NOTES
[facility]   Mental Health and Addiction Care   [facility], [facility], and [facility] [facility] [employer]    [phone] or [phone]   Treatment Plan    Patient  Name: Kayden Cortez  MRN: [id number]   Counselor: Jose Bowen MA, LMFT, [facility]    Title: Dimension 1 Withdrawal Potential, Risk level 0, no concerns  Plan Date: 12/1/2017  Problem: Patient reports last date of use as 6/27/17 and further reported 3 relapses for methamphetamines in the past 6 months. Patient had a positive UA on 9/13/17 for methamphetamine, UA on 9/15/2017 was negative.     Goal: Begin Date: 8/28/2017  Target Date: 12/15/2017  Maintain abstinence throughout treatment in order to avoid experiencing withdrawal symptoms and further your recovery.    Method 1: Begin Date:8/28/2017   Target Date: 12/15/2017  Stauts: Ongoing  Patient to refrain from any illicit substance or alcohol use, and report any substance/alcohol use to primary counselor to determine if any changes need to be made to the treatment plan to address withdrawal.       Title: Dimension 2 Biomedical condition, Risk level 1, mild concerns  Plan Date: 12/1/2017  Problem: Patient has multiple medical issues including current dx of CAD, chronic back issues and recent surgery to address torn tendons in arm. Patient has ability to seek medical care when needed and has a current PCP.    Goal: Begin Date: 8/28/2017  Target Date: 12/15/2017  To maintain stable physical health to participate in treatment services, while also making healthy changes to support ongoing recovery.     Method 1: Begin Date:8/28/2017   Target Date: 12/15/2017  Stauts: Ongoing  Patient to report any changes in physical health that would impact treatment participation.     Is Nicotine use indicated on the assessment? YES  Method 2: Begin Date: 8/28/2017  Target Date: 12/15/2017  Status: In progress   Staff to provide patient with nicotine cessation information and help on how to quit use. Patient to report progress  on stopping nicotine use to staff.       Title: Dimension 3, Emotional, behavioral, cognitive condition, Risk level 2, moderate concerns  Plan Date: 12/1/2017  Problem: Patient reports MH dx of Bipolar disorder, PTSD and ADHD. He has psychiatric and MH therapy services through [facility] and also has case management services through [facility].     Goal: Begin Date: 8/28/2017  Target Date: 12/15/2017  Patient to report better understanding of the relationship between mental health and substance use and be better able to cope with mental health symptoms.     Method 1: Begin Date: 8/28/2017  Target Date: 10/20/2017  Status: Met  Patient to meet with primary counselor or other  staff for the purpose of completed a diagnostic assessment, or sign release of information from current MH provider.    Method 2: Begin Date: 8/28/2017  Target Date: 12/15/2017  Status: Ongoing  Patient to begin using coping skills learned in MICD group (such as grounding, breathing, thought challenging, mindfulness, etc), and share in daily check-in any benefits or challenges that you experience using these skills.    Method 3: Begin Date: 9/15/2017  Target Date: 10/20/2017  Status: Met  Kayden will maintain contact with his  through [facility].     Method 4: Begin Date: 9/15/2017  Target Date: 10/20/2017  Status: Met  Kayden will explore the benefit of re-engaging with  therapy and addressing grief and loss issues in therapy. Kayden will discuss issues related to grief and loss in both individual and group sessions and how it relates to his past drug use.     Method 5: Begin Date: 9/25/2017  Target Date: 12/15/2017  Status: In progress  Kayden will attend therapy appointment with Linwood at [facility] on 12/18/2017 and attend ongoing therapy appointments.       Title: Dimension 4, Treatment Acceptance/Resistance, Risk level 1, mild concerns  Plan Date: 12/1/2017  Problem: Patient reports internal motivation  to attend treatment and maintain sobriety, however, he has current legal involvement through [facility] that is also a motivating factor.     Goal: Begin Date: 8/28/2017  Target Date: 12/15/2017  Patient to identify motivation for abstinence and gain further insight into barriers that have prevented maintaining abstinence.     Method 1: Begin Date: 8/28/2017   Target Date: 12/15/2017  Status: Ongoing  Patient to attend all schedule MICD groups or 1:1s, and contact staff if unable to attend. Patient to attend 4x/weekly (Jessi: [phone])    Method 2: Begin Date: 8/28/2017  Target Date: 11/17/2017  Status: Met  Patient to identify 5 reasons to maintain abstinence.       Title: Dimension 5, Relapse potential, Risk level 2, moderate concerns  Plan Date: 12/1/2017   Problem: Patient reports a history of multiple treatment attempts over the last 5 years and subsequent relapses. Patient appears to have some knowledge of addiction concepts and relapse prevention strategies.     Goal: Begin Date: 8/28/2017  Target Date: 11/17/2017  Further develop relapse prevention and coping skills to utilizing when experiencing situations that increased the likelihood of relapse.     Method 1: Begin Date: 8/28/2017  Target Date: 12/15/2017  Status: Ongoing  Patient to share in daily check-in any urges and addictive thinking to better understand his pattern of use and to prevent relapse in the future.     Method 2: Begin Date: 8/28/2017  Target Date: 11/17/2017  Status: Met  Patient to meet with primary counselor to identify personal triggers for relapse.     Method 2: Begin Date: 10/20/2017  Target Date: 12/15/2017  Status: In progress  Patient will identify 10 coping strategies to address relapse risk.       Title: Dimension 6, Recovery Environment, Risk level 3, serious concerns  Plan Date: 12/1/2017  Problem: Patient is unemployed and has little daily structure. He is involved with [facility] due to a drug  possession charge and reports significant criminal history. He has current CPS involvement for his minor daughter who lives with her grandparents in [place].     Goal: Begin Date: 8/28/2017  Target Date: 12/15/2017  Explore and make lifestyle changes needed to maintain recovery free from substance use and legal problems.     Method 1: Begin Date: 8/28/2017  Target Date: 12/15/2017  Status: On going  Maintain contact with  (Farhat) and CPS worker and follow both case plans.     Method 2: Begin Date: 8/28/2017  Target Date: 11/17/2017  Status: Met  Explore the connection between substance use and legal problems.     Method 3: Begin Date: 9/25/2017  Target Date: 12/15/2017  Status: In progress  Identify 2-3 support groups to attend weekly.       By signing this document, I am acknowledging that I was actively and directly involved in the development of my treatment plan.           Patient Signature:_____________________________ Date: _________________        Staff Signature:  Jessi Garcia MA, LMFT, [facility]      Date: 12/1/2017

## 2021-06-14 NOTE — PROGRESS NOTES
Weekly Progress Note  Kayden Cortez  1970  [id number]      D) Pt attended 2 groups  this week with 2 absences. Patient attended 0 individual sessions this week.    A) Staff facilitated groups and reviewed tx progress. Assessed for [facility].   R) No VAP needed at this time.     Any significant events, defines as events that impact patients relationship with others inside and outside of treatment: Patient continues to grieve the loss of his sister.   Indicate any changes or monitoring of physical or mental health problems: Yes: Patient has scheduled individual therapy appointment on 12/18/17. He reports status of mental health symptoms during each MICD group session.   Indicate involvement by any outside supports: Yes: Patient has been attending support group meetings.   IAPP reviewed and modified as needed: NA    Pt working on the following dimensions:  Dimension #1 - Withdrawal Potential - Risk 0. Patient reports LDU as 6/27/17. He had a positive UA on 9/13/17 for methamphetamine and denied use. He has had subsequent UA's which have been negative for all substances.   Specific goals from treatment plan addressed this week:  Patient reports continued abstinence.   Effectiveness of strategies:  Strategies are still effective.   Dimension #2 - Biomedical - Risk 1. Patient has several medical issues including current dx of CAD, chronic back pain, and a recent surgery to repair torn tendons in his arm. Patient has the ability to seek medical care when needed and has a current PCP.   Specific goals from treatment plan addressed this week:  Patient reports good physical health at this time.   Effectiveness of strategies:  Strategies are still effective.   Dimension #3 - Emotional/Behavioral/Cognitive - Risk 2. Patient is diagnosed with PTSD, Unspecified anxiety disorder, and Bipolar I disorder. He previously received both therapy and psychiatry services through [facility]. He is scheduled for a  therapy appointment with BELINDA Conde at [facility] for a follow up appointment as he previously saw Linwood for therapy, however, he has not attended an appointment yet due to several cancellations on both patient and therapist's part, current appointment is scheduled for 17. Patient reports stable mental health symptoms. He appears anxious in group often evidenced by his bouncing legs, specifically, during mindfulness exercises. Patient reports his sister  of a heart attack on 17 as patient received a phone call during group informing him. Patient reports current grief and loss and appears to be struggling with depression at this time.   Active interventions to stabilize mental health symptoms:  He reports status of mental health symptoms daily in group.  Specific goals from treatment plan addressed this week:  Patient reports continuing to experience grief related to his sister's death. He missed his psychotherapy appointment on 17 and rescheduled the appointment for 17. Patient reports that he is working on continuing to due self-care, however, finds it hard at times. Patient's dysphoric affect was observable in group. Patient continues to talk about how he is feeling and reports congruence with what is observed.   Effectiveness of strategies:  Strategies are still effective.   Dimension #4 - Treatment Acceptance/Resistance - Risk 0. Patient reports internal motivation to attend treatment and maintain sobriety, however, he has current legal involvement through [facility] that is also a motivating factor. Patient reports commitment to attend and complete treatment.   Specific goals from treatment plan addressed this week:  Patient has attended 2/4 groups this week. He did not communicate his absences with writer and states that he was unable to call due to his phone being shut off due to lack of funds. He reports motivation to complete treatment and maintain changes.  Patient was passively engaged in group this week.   Effectiveness of strategies:  Strategies are still effective.   Dimension #5 - Relapse Potential - Risk 2. Patient reports a history of multiple treatment attempts over the last 5 years and subsequent relapses. Patient appears to have some knowledge of addiction concepts and relapse prevention strategies. Patient also appears to be resistant to the idea of distancing himself from using friends and states that being around people who use gives him strength in his determination to be sober. Patient identifies that he has used substances in the past to deal with difficult emotions and situations as well as to manage mental health symptoms. Patient also appears to be in an intimate relationship with a woman who is using substances.   Specific goals from treatment plan addressed this week:  Patient reports experiencing some urges to use, however, states he is able to ride them out. He denies any substance use. Patient continues to report a desire to address his grief and loss and other MH related concerns as he feels this is important in maintaining his sobriety.   Effectiveness of strategies:  Strategies are still effective.   Dimension #6 - Recovery Environment - Risk 3. Patient is unemployed and has little daily structure. [facility] and reports significant criminal history. He has current CPS involvement for his minor daughter who lives with her grandparents in [place]. Patient has limited sober support at this time and identifies that he needs expand his sober support network, however, appears to continue to maintain contact with using people.   Specific goals from treatment plan addressed this week:  Patient continues to report attending support group meetings weekly. He reports maintaining contact with his  and recognizes that he needs to remain sober in order to comply with his case  plan. Patient continues to report contact with his family.  Effectiveness of strategies:  Strategies are still effective.   T) Treatment plan updated: yes.  Patient notified and in agreement: YES.  Patient educated on Feelings/Emotions. Patient has completed 83 of 90 program hours at this time. Projected discharge date is 12/15/2017. Current discharge plan is Relapse Prevention.     Jose Bowen  12/2/2017, 10:16 AM       Weekly Educational Topics Date Education   1. Understanding Dual Diagnoses, week 1 8/14  Excused on 8/15, 8/16, 8/18 11/6 11/7  Excused 11/8 and 11/10 No kidding me 2   2. Understanding Dual Diagnoses, week 2 Excused 8/21-8/23, 8/25 11/13-11/15  Unexcused 11/17    3. Stress Management 8/28 8/29  Excused 8/30  Unexcused 9/1 11/20 GENARO Talk-Good Stress  GENARO Talk-Stress   4. Managing Difficult Feelings 9/5  Excused 9/6 9/8 11/27  Unexcused 11/28 and 11/29 12/1 Inside Out Clips   5. Managing Thinking Problems Excused 9/11  Unexcused 9/12  9/13  9/15    6. Building Recovery Support 9/18 9/19 9/20 9/22 Anonymous People   7. Relationships/Communication Excused 9/25 9/26 9/27 9/29    8. Addiction 101 Excused 10/2  Unexcused 10/3 and 10/4  10/6    9. Relapse Prevention Excused 10/9  10/10  Unexcused on 10/11 and 10/13 30 for 30: Unguarded   10. Grief and Loss 10/16  Excused 10/17  10/18  10/20 GENARO Talk  Aly Vanegas: Humor in Treatment   11. Strengths 10/23  10/24  Excused 10/25  10/27    12. Wellness 10/30  Excused 10/31  11/1  Excused 11/3    Seeking Safety Unit every Tuesday     Mindfulness every Friday

## 2021-06-14 NOTE — PROGRESS NOTES
Transfer Note  Kayden Cortez  1970  [id number]      D) Pt attended 3 groups  this week with 1 absences. Patient attended 1 individual sessions this week.    A) Staff facilitated groups and reviewed tx progress. Assessed for [facility].   R) No VAP needed at this time.     Any significant events, defines as events that impact patients relationship with others inside and outside of treatment: Patient reported interpersonal conflict with his ex girlfriend, who he broke up with this week.   Indicate any changes or monitoring of physical or mental health problems: Yes: Patient has scheduled individual therapy appointment on 12/18/17. He reports status of mental health symptoms during each MICD group session.   Indicate involvement by any outside supports: Yes: Patient has been attending support group meetings.   IAPP reviewed and modified as needed: NA    Pt working on the following dimensions:  Dimension #1 - Withdrawal Potential - Risk 0. Patient reports LDU as 6/27/17. He had a positive UA on 9/13/17 for methamphetamine and denied use. He has had subsequent UA's which have been negative for all substances.   Specific goals from treatment plan addressed this week:  Patient reports continued abstinence.   Effectiveness of strategies:  Strategies are still effective.   Dimension #2 - Biomedical - Risk 1. Patient has several medical issues including current dx of CAD, chronic back pain, and a recent surgery to repair torn tendons in his arm. Patient has the ability to seek medical care when needed and has a current PCP.   Specific goals from treatment plan addressed this week:  Patient reports good physical health at this time.   Effectiveness of strategies:  Strategies are still effective.   Dimension #3 - Emotional/Behavioral/Cognitive - Risk 2. Patient is diagnosed with PTSD, Unspecified anxiety disorder, and Bipolar I disorder. He previously received both therapy and psychiatry services [email or web address] He is scheduled for a therapy appointment with BELINDA Conde at [facility] for a follow up appointment as he previously saw Linwood for therapy, however, he has not attended an appointment yet due to several cancellations on both patient and therapist's part, current appointment is scheduled for 17. Patient reports stable mental health symptoms. He appears anxious in group often evidenced by his bouncing legs, specifically, during mindfulness exercises. Patient reports his sister  of a heart attack on 17 as patient received a phone call during group informing him. Patient reports current grief and loss and appears to be struggling with depression at this time.   Active interventions to stabilize mental health symptoms:  He reports status of mental health symptoms daily in group.  Specific goals from treatment plan addressed this week:  Patient reports continuing to experience grief related to his sister's death. He missed his psychotherapy appointment on 17 and rescheduled the appointment for 17. Patient reports that he is working on continuing to due self-care, however, finds it hard at times. Patient identified experiencing frustration related to how his girlfriend reacted when he broke up with her and asked her to move out of the apartment. He verbalized feeling confused by her statements and described her reaction as irrotational. He expressed feeling sad and relieved at the same time.   Effectiveness of strategies:  Strategies are still effective.   Dimension #4 - Treatment Acceptance/Resistance - Risk 0. Patient reports internal motivation to attend treatment and maintain sobriety, however, he has current legal involvement through [facility] that is also a motivating factor. Patient reports commitment to attend and complete treatment.   Specific goals from treatment plan addressed this week:  Patient has attended 3/4 groups this week. He communicated his  absence with writer. He reports motivation to complete treatment and maintain changes. Patient completed MICD group this week and will begin Relapse Prevention next week on 12/14/17.  Effectiveness of strategies:  Strategies are still effective.   Dimension #5 - Relapse Potential - Risk 2. Patient reports a history of multiple treatment attempts over the last 5 years and subsequent relapses. Patient appears to have some knowledge of addiction concepts and relapse prevention strategies. Patient also appears to be resistant to the idea of distancing himself from using friends and states that being around people who use gives him strength in his determination to be sober. Patient identifies that he has used substances in the past to deal with difficult emotions and situations as well as to manage mental health symptoms. Patient also appears to be in an intimate relationship with a woman who is using substances.   Specific goals from treatment plan addressed this week:  Patient denies experiencing urges or cravings to use. Patient identified that ended his relationship with his girlfriend is a behavior that helps his chances at remaining sober. Patient further identified that for the first part of treatment he was trying to circumvent the system and later realized that in order to maintain his sobriety he needed to make real changes to his life including not hanging out with people who use and increasing his sober support.   Effectiveness of strategies:  Strategies are still effective.   Dimension #6 - Recovery Environment - Risk 3. Patient is unemployed and has little daily structure. He is involved with [facility] due to a drug possession charge and reports significant criminal history. He has current CPS involvement for his minor daughter who lives with her grandparents in [place]. Patient has limited sober support at this time and identifies that he needs expand his sober support network; he appears to be  actively working on this and stated that he has began going to a recovery [employer] again.   Specific goals from treatment plan addressed this week:  Patient continues to report attending support group meetings weekly. He reports maintaining contact with his  and recognizes that he needs to remain sober in order to comply with his case plan. Patient continues to report contact with his family who are supportive of his sobriety.   Effectiveness of strategies:  Strategies are still effective.   T) Treatment plan updated: no  Patient notified and in agreement: no  Patient educated on Thinking. Patient has completed 89 of 90 program hours at this time. Patient completed MICD program on 12/8/17 and will begin Relapse Prevention on 12/14/17.    Jose Bowen  12/8/2017, 2:21 PM       Weekly Educational Topics Date Education   1. Understanding Dual Diagnoses, week 1 8/14  Excused on 8/15, 8/16, 8/18 11/6 11/7  Excused 11/8 and 11/10 No kidding me 2   2. Understanding Dual Diagnoses, week 2 Excused 8/21-8/23, 8/25 11/13-11/15  Unexcused 11/17    3. Stress Management 8/28 8/29  Excused 8/30  Unexcused 9/1 11/20 GENARO Talk-Good Stress  GENARO Talk-Stress   4. Managing Difficult Feelings 9/5  Excused 9/6 9/8 11/27  Unexcused 11/28 and 11/29 12/1 Inside Out Clips   5. Managing Thinking Problems Excused 9/11  Unexcused 9/12  9/13  9/15    Excused 12/4 12/5 12/6 12/8    6. Building Recovery Support 9/18 9/19 9/20 9/22 Anonymous People   7. Relationships/Communication Excused 9/25 9/26 9/27 9/29    8. Addiction 101 Excused 10/2  Unexcused 10/3 and 10/4  10/6    9. Relapse Prevention Excused 10/9  10/10  Unexcused on 10/11 and 10/13 30 for 30: Unguarded   10. Grief and Loss 10/16  Excused 10/17  10/18  10/20 GENARO Vanegas: Humor in Treatment   11. Strengths 10/23  10/24  Excused 10/25  10/27    12. Wellness 10/30  Excused 10/31  11/1  Excused 11/3    Seeking Safety Unit every Tuesday      Mindfulness every Friday

## 2021-06-14 NOTE — PROGRESS NOTES
Weekly Progress Note  Kayden Cortez  1970  [id number]    Late entry from 10/27/17    D) Pt attended 2 groups  this week with 2 absences. Patient attended 0 individual sessions this week.    A) Staff facilitated groups and reviewed tx progress. Assessed for [facility].   R) No VAP needed at this time.     Any significant events, defines as events that impact patients relationship with others inside and outside of treatment: Patient received a phone call on 17 that his sister  of a heart attack.   Indicate any changes or monitoring of physical or mental health problems: Yes: Patient was scheduled for a therapy appointment at [facility] on 17, however, he did not attend. He reports status of mental health symptoms during each MICD group session.   Indicate involvement by any outside supports: Yes: Patient has been attending support group meetings.   IAPP reviewed and modified as needed: NA    Pt working on the following dimensions:  Dimension #1 - Withdrawal Potential - Risk 0. Patient reports LDU as 17. He had a positive UA on 17 for methamphetamine and denied use. He has had  subsequent UA's which have been negative for all substances.   Specific goals from treatment plan addressed this week:  Patient reports continued abstinence.   Effectiveness of strategies:  Strategies are still effective.   Dimension #2 - Biomedical - Risk 1. Patient has several medical issues including current dx of CAD, chronic back pain, and a recent surgery to repair torn tendons in his arm. Patient has the ability to seek medical care when needed and has a current PCP.   Specific goals from treatment plan addressed this week:  Patient reports good physical health at this time.   Effectiveness of strategies:  Strategies are still effective.   Dimension #3 - Emotional/Behavioral/Cognitive - Risk 2. Patient is diagnosed with PTSD, Unspecified anxiety disorder, and Bipolar I disorder. He previously received both therapy and  psychiatry services through [facility]. He is scheduled for a therapy appointment with BELINDA Conde at [facility] for a follow up appointment as he previously saw [facility] for therapy, however, he has not attended an appointment yet due to several cancellations on both patient and therapist's part. Patient reports stable mental health symptoms. He appears anxious in group often evidenced by his bouncing legs, specifically, during mindfulness exercises. Patient reports his sister  of a heart attack on 17 as patient received a phone call during group informing him.   Active interventions to stabilize mental health symptoms:  He reports status of mental health symptoms daily in group.  Specific goals from treatment plan addressed this week:  Patient reports stable mental health this week. He identifies feeling overwhelmed at the beginning of the week. Patient's sister  on 17 and patient did not attend group the reminder of the week. He appeared emotional after receiving the phone call and spoke with writer about leaving group. He called writer to check in on 11/10/17 and stated he would resume attending group on 17.  Effectiveness of strategies:  Strategies are still effective.   Dimension #4 - Treatment Acceptance/Resistance - Risk 1. Patient reports internal motivation to attend treatment and maintain sobriety, however, he has current legal involvement through [facility] that is also a motivating factor. Patient reports commitment to attend and complete treatment.   Specific goals from treatment plan addressed this week:  Patient has attended 2/4 groups this week. He called writer to check in on 11/10/17 and stated he would resume attending group on 17. He reports motivation to complete treatment and maintain changes.   Effectiveness of strategies:  Strategies are still effective.   Dimension #5 - Relapse Potential - Risk 2. Patient reports a  history of multiple treatment attempts over the last 5 years and subsequent relapses. Patient appears to have some knowledge of addiction concepts and relapse prevention strategies. Patient also appears to be resistant to the idea of distancing himself from using friends and states that being around people who use gives him strength. Patient identifies that he has used substances in the past to deal with difficult emotions and situations as well as to manage mental health symptoms.   Specific goals from treatment plan addressed this week:  Patient denies urges or cravings to use. Patient identifies that addressing his mental health is important to maintain his sobriety.   Effectiveness of strategies:  Strategies are still effective.   Dimension #6 - Recovery Environment - Risk 3. Patient is unemployed and has little daily structure. [facility] [place] to a drug possession charge and reports significant criminal history. He has current CPS involvement for his minor daughter who lives with her grandparents in [place]. Patient has limited sober support at this time and identifies that he needs expand his sober support network, however, appears to continue to maintain contact with using people.   Specific goals from treatment plan addressed this week:  Patient continues to reports attending meetings weekly. He did not indicate if he attended Jainism. He reports maintaining contact with his  and recognizes that he needs to remain sober in order to comply with his case plan. Patient reports he has been spending more time with his youngest daughter and also has been seeing his grandchildren more as well.   Effectiveness of strategies:  Strategies are still effective.   T) Treatment plan updated: no.  Patient notified and in agreement: NA.  Patient educated on Dual Diagnosis. Patient has completed 76 of 90 program hours at this time. Projected discharge date is 12/1/2017. Current  discharge plan is Relapse Prevention.     Jose Bowen  11/10/2017, 1:54 PM       Weekly Educational Topics Date Education   1. Understanding Dual Diagnoses, week 1 8/14  Excused on 8/15, 8/16, 8/18 11/6 11/7  Excused 11/8 and 11/10 No kidding me 2   2. Understanding Dual Diagnoses, week 2 Excused 8/21-8/23, 8/25     3. Stress Management 8/28 8/29  Excused 8/30  Unexcused 9/1 GENARO Talk-Good Stress  GENARO Talk-Stress   4. Managing Difficult Feelings 9/5  Excused 9/6 9/8 Inside Out Clips   5. Managing Thinking Problems Excused 9/11  Unexcused 9/12  9/13  9/15    6. Building Recovery Support 9/18 9/19 9/20 9/22 Anonymous People   7. Relationships/Communication Excused 9/25 9/26 9/27 9/29    8. Addiction 101 Excused 10/2  Unexcused 10/3 and 10/4  10/6    9. Relapse Prevention Excused 10/9  10/10  Unexcused on 10/11 and 10/13 30 for 30: Unguarded   10. Grief and Loss 10/16  Excused 10/17  10/18  10/20 GENARO Talk  Aly Vanegas: Humor in Treatment   11. Strengths 10/23  10/24  Excused 10/25  10/27    12. Wellness 10/30  Excused 10/31  11/1  Excused 11/3    Seeking Safety Unit every Tuesday     Mindfulness every Friday

## 2021-06-14 NOTE — PROGRESS NOTES
Weekly Progress Note  Kayden Cortez  1970  [id number]      D) Pt attended 3 groups  this week with 1 absences. Patient attended 0 individual sessions this week.    A) Staff facilitated groups and reviewed tx progress. Assessed for [facility].   R) No VAP needed at this time.     Any significant events, defines as events that impact patients relationship with others inside and outside of treatment: Patient received a phone call on 17 that his sister  of a heart attack.   Indicate any changes or monitoring of physical or mental health problems: Yes: Patient was scheduled for a therapy appointment at [facility] on 17, however, he did not attend. He reports status of mental health symptoms during each MICD group session.   Indicate involvement by any outside supports: Yes: Patient has been attending support group meetings.   IAPP reviewed and modified as needed: NA    Pt working on the following dimensions:  Dimension #1 - Withdrawal Potential - Risk 0. Patient reports LDU as 17. He had a positive UA on 17 for methamphetamine and denied use. He has had  subsequent UA's which have been negative for all substances.   Specific goals from treatment plan addressed this week:  Patient reports continued abstinence.   Effectiveness of strategies:  Strategies are still effective.   Dimension #2 - Biomedical - Risk 1. Patient has several medical issues including current dx of CAD, chronic back pain, and a recent surgery to repair torn tendons in his arm. Patient has the ability to seek medical care when needed and has a current PCP.   Specific goals from treatment plan addressed this week:  Patient reports good physical health at this time.   Effectiveness of strategies:  Strategies are still effective.   Dimension #3 - Emotional/Behavioral/Cognitive - Risk 2. Patient is diagnosed with PTSD, Unspecified anxiety disorder, and Bipolar I disorder. He previously received both therapy and psychiatry services through  [facility]. He is scheduled for a therapy appointment with BELINDA Conde at [facility] for a follow up appointment as he previously saw [facility] for therapy, however, he has not attended an appointment yet due to several cancellations on both patient and therapist's part. Patient reports stable mental health symptoms. He appears anxious in group often evidenced by his bouncing legs, specifically, during mindfulness exercises. Patient reports his sister  of a heart attack on 17 as patient received a phone call during group informing him.   Active interventions to stabilize mental health symptoms:  He reports status of mental health symptoms daily in group.  Specific goals from treatment plan addressed this week:  Patient reports feeling sad this week. He discussed his emotions related to his sister's recent death.    Effectiveness of strategies:  Strategies are still effective.   Dimension #4 - Treatment Acceptance/Resistance - Risk 1. Patient reports internal motivation to attend treatment and maintain sobriety, however, he has current legal involvement through [facility] that is also a motivating factor. Patient reports commitment to attend and complete treatment.   Specific goals from treatment plan addressed this week:  Patient has attended 3/4 groups this week. Patient did not communicated absence with writer this week. He reports motivation to complete treatment and maintain changes.   Effectiveness of strategies:  Strategies are still effective.   Dimension #5 - Relapse Potential - Risk 2. Patient reports a history of multiple treatment attempts over the last 5 years and subsequent relapses. Patient appears to have some knowledge of addiction concepts and relapse prevention strategies. Patient also appears to be resistant to the idea of distancing himself from using friends and states that being around people who use gives him strength. Patient identifies that  he has used substances in the past to deal with difficult emotions and situations as well as to manage mental health symptoms.   Specific goals from treatment plan addressed this week:  Patient reports experiencing urges to use and was able to identify that this may be due to the difficult emotions he is experiencing due to his sister's recent death. Patient identifies that addressing his mental health is important to maintain his sobriety.   Effectiveness of strategies:  Strategies are still effective.   Dimension #6 - Recovery Environment - Risk 3. Patient is unemployed and has little daily structure. He is involved with [facility] Probation due to a drug possession charge and reports significant criminal history. He has current CPS involvement for his minor daughter who lives with her grandparents in [place]. Patient has limited sober support at this time and identifies that he needs expand his sober support network, however, appears to continue to maintain contact with using people.   Specific goals from treatment plan addressed this week:  Patient continues to reports attending meetings weekly. He did not indicate if he attended Bahai. He reports maintaining contact with his  and recognizes that he needs to remain sober in order to comply with his case plan.   Effectiveness of strategies:  Strategies are still effective.   T) Treatment plan updated: no.  Patient notified and in agreement: NA.  Patient educated on Dual Diagnosis. Patient has completed 81 of 90 program hours at this time. Projected discharge date is 12/1/2017. Current discharge plan is Relapse Prevention.     Jose Bowen  11/17/2017, 3:41 PM       Weekly Educational Topics Date Education   1. Understanding Dual Diagnoses, week 1 8/14  Excused on 8/15, 8/16, 8/18 11/6 11/7  Excused 11/8 and 11/10 No kidding me 2   2. Understanding Dual Diagnoses, week 2 Excused 8/21-8/23, 8/25 11/13-11/15  Unexcused 11/17    3. Stress  Management 8/28 8/29  Excused 8/30  Unexcused 9/1 GENARO Talk-Good Stress  GENARO Talk-Stress   4. Managing Difficult Feelings 9/5  Excused 9/6 9/8 Inside Out Clips   5. Managing Thinking Problems Excused 9/11  Unexcused 9/12  9/13  9/15    6. Building Recovery Support 9/18 9/19 9/20 9/22 Anonymous People   7. Relationships/Communication Excused 9/25 9/26 9/27 9/29    8. Addiction 101 Excused 10/2  Unexcused 10/3 and 10/4  10/6    9. Relapse Prevention Excused 10/9  10/10  Unexcused on 10/11 and 10/13 30 for 30: Unguarded   10. Grief and Loss 10/16  Excused 10/17  10/18  10/20 GENARO Talk  Aly Vanegas: Humor in Treatment   11. Strengths 10/23  10/24  Excused 10/25  10/27    12. Wellness 10/30  Excused 10/31  11/1  Excused 11/3    Seeking Safety Unit every Tuesday     Mindfulness every Friday

## 2021-06-15 PROBLEM — R19.7 NAUSEA VOMITING AND DIARRHEA: Status: ACTIVE | Noted: 2017-02-16

## 2021-06-15 PROBLEM — R11.2 NAUSEA VOMITING AND DIARRHEA: Status: ACTIVE | Noted: 2017-02-16

## 2021-06-15 NOTE — PROGRESS NOTES
Transfer Note    Kayden Cortez, is a Pt who successfully completed MICD IOP at [facility] and was referred to [facility] on 12/8/17. Pt's last group session was 12/8/17 in MICD.  Pt never presented to group, with unexcused absences on 12/14/17, 12/21/17, and 12/28/17. There has been no phone contact with the Patient. Pt has been referred back to MICD for discharge.     Donald Welander, LADC  12/28/2017  3:17 PM

## 2021-06-15 NOTE — PROGRESS NOTES
[facility] SUBSTANCE USE DISORDER  DISCHARGE SUMMARY            NAME:  Kayden Cortez   Physician:  Dr. Esqueda   MRN:  [id number]   : Jessi Garcia MA, MARIA VICTORIA, [facility]   Admit Date: 2017   Discharge Date: 2017   :  1970   Hours Completed: 89   Initial Diagnosis:  Stimulant (Methamphetamine) Use Disorder-Moderate (F15.20)   Final Diagnosis:  Stimulant (Methamphetamine) Use Disorder-Moderate (F15.20)   Discharge Address:    [address] [place] [address]   Funding Source:    [facility]     Discharge Type:  With Staff Approval (WSA)    Reasons for and circumstances of service termination:  Kayden successfully completed the MICD IOP program 17. He was referred to [facility] at his request and was also encouraged to to attend as a way to continue engagement with a sober support network, however, patient never presented to group.      Dimension/Course of Treatment/Individualized Care:   1.  Withdrawal Potential - Risk level -  0  Treatment plan goals and progress towards those goals:  Patient reported abstinence throughout programming. He identified his last date of use of methamphetamine on 17. He had one positive UA on 17, which, was attributed to medications patient was prescribed on a PRN basis for asthma. All subsequent UAs were negative for all substances.      2.  Biomedical Conditions and Complications - Risk level -  1  Treatment plan goals and progress towards those goals:  Patient identified several medical concerns at the onset of treatment including CAD, chronic back pain, and a recent surgery to repair torn tendons in his arm. He attended doctor appointments throughout the duration of treatment including physical therapy. Patient continued to smoke cigarettes throughout treatment.      3.  Emotional/Behavioral/Cognitive Conditions and Complications - Risk level -  2  Treatment plan goals and progress towards those goals:  Patient had an open  case management case through [facility] when he first began treatment, however, due to lack of contact the case was closed. Patient expressed throughout the course of treatment that he used methamphetamine to deal with symptoms related to anxiety as well as to cope with grief and loss. He was diagnosed with PTSD, Unspecified anxiety disorder, and Bipolar I disorder by Manda Valencia, Ph.d, LP at [facility] on 11/23/2016. He continued to report symptoms related anxiety throughout treatment participation. He continued to experience symptoms of grief and loss related to his wife's death which were compounded by his sister's death on in November 2017. Patient was not involved in psychotherapy services throughout treatment participation, however, had scheduled several appointments with BELINDA Conde through 's [facility] that were rescheduled by both patient and provider. Patient has previously seen this provider and expressed interest in re-engaging. Patient did attend a psychotherapy appointment with BELINDA Conde on 12/18/17 and has set up ongoing appointments as well as being referred to psychiatry. Patient was verbal in group and individual settings regarding his mental health symptoms and worked towards increasing his use of self-care practices. He also expressed an interest in complying with psychotropic medications, hence the referral for psychiatry made via his psychotherapist.      4.  Treatment Acceptance/Resistance - Risk Level -  0  Treatment plan goals and progress towards those goals:  Patient struggled with treatment attendance during the middle of his treatment participation. After a verbal agreement between patient and staff that he would be discharged due to any further unexcused absences, his treatment attendance improved. He identified several motivators to completed treatment and maintain his abstinence including probation completion, the ability to be more  involved in his youngest daughter's life, mental health stability, and feeling more positive about himself. He reported that his life had a more positive outlook when he is sober compared to when he is actively using.      5.  Relapse/Continued Use/Continued Problem Potential - Risk level -  2  Treatment plan goals and progress towards those goals:  Patient reports abstinence throughout his treatment participation. He identified that he used methamphetamine to cope with difficult situations and emotions as well as to manage mental health symptoms. He worked to identify triggers for use as well as barriers to maintaining sobriety. Patient identified that for the first part of treatment he was trying to circumvent the system and later realized that in order to maintain his sobriety he needed to make real changes to his life including not hanging out with people who use and increasing his sober support.      6.  Recovery Environment - Risk level -  3  Treatment plan goals and progress towards those goals:  Patient was involved with [facility] Probation and Child Protective Services while in treatment. He attended court hearings and reported compliance with case plans. Patient struggled with distancing himself from antagonist people throughout the majority of the treatment episode. Towards the end of treatment, he worked on cutting out people who were actively using including ending his relationship with his girlfriend. He worked on increasing his attendance at support group meetings as well as resuming attendance at the [facility]. Patient also identified that in order to remain law abiding and he needs to maintain abstinence as much of his legal problems stemmed from his addiction. He also appeared to have support from his parents and adult children as well as other extended family members.      Strengths: Patient was insightful. He displayed a caring and compassionate demeanor when in group. He worked to open  up more in group and was committed to addressing his mental health and grief and loss issues.   Needs: Patient denied any concerns regarding basic needs. Patient would benefit from mental health case management, which was discussed with patient as well as patient's .     Services Provided: Intake, assessment, treatment planning, education, group discussion, film, lectures, 1x1 therapy, and recommendations at discharge.        Program Involvement: Good  Attendance: Good  Ability to relate in group/   Other program activities: Excellent  Assignment Completion: Good  Overall Behavior: Good  Reported Family/Significant   Other Involvement: N/A    Prognosis: Good      Recommendations       Attend recovery oriented support group meetings and increase sober support network.     Mental Health Referral  Individual Therapy; Medication management and medication compliance.       Physical Health Referral:  Patient is referred to his PCP to address medical concerns.              Counselor Name and Title:  Jessi Garcia MA, LMFT, [facility]        Date:  1/2/2018  Time:  2:21 PM
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
## 2021-06-16 PROBLEM — R10.9 ABDOMINAL PAIN: Status: ACTIVE | Noted: 2018-02-13

## 2021-06-16 NOTE — TELEPHONE ENCOUNTER
Telephone Encounter by Dulce Maria Plata at 8/6/2019  8:41 AM     Author: Dulce Maria Plata Service: -- Author Type: --    Filed: 8/6/2019  8:42 AM Encounter Date: 7/31/2019 Status: Signed    : Dulce Maria Plata       PRIOR AUTHORIZATION DENIED    Denial Rational: Patient is able to get up to 1 capsule per day for a maximum of 120 days within 365 days.  This is the duration set by patient's plan for PPIs.  This limit is shared across all PPIs.             Appeal Information: If provider would like to appeal please provide a letter of medical necessity and route back to the PA team.

## 2021-06-17 NOTE — TELEPHONE ENCOUNTER
Reason for Call:  Medication or medication refill:    Do you use a [facility]?  Name of the pharmacy and phone number for the current request: [facility], [address] [place] [id number], p) [phone] and f) [phone]    Name of the medication requested: ALL medications per patient's mother, Mirian     Other request: Mirian called stating that pt has a friend that passed away and pt decided to get treatment. Patient is out of his medications and needs refills for all his meds. Mirian is wondering if Dr. Constantino is able to refill them and to send it to [facility]. If provider has any questions, OK to call Mirian.     Can we leave a detailed message on this number? Yes    Phone number patient can be reached at: Other phone number:  [phone]    Best Time: Any time    Call taken on 5/18/2021 at 12:09 PM by Wendi Jaffe

## 2021-06-18 ENCOUNTER — RECORDS - HEALTHEAST (OUTPATIENT)
Dept: ADMINISTRATIVE | Facility: OTHER | Age: 51
End: 2021-06-18

## 2021-06-19 NOTE — LETTER
Letter by Aly Constantino MD at      Author: Aly Constantino MD Service: -- Author Type: --    Filed:  Encounter Date: 2019 Status: (Other)         Kayden Cortez  [address] [place] [id number]      2019      Dear Kayden Cortez,   : 1970      This letter is in regards to the appointment that you had scheduled on 19 at the [facility] with Dr. Constantino.     The [facility] strives to see all patients in a timely manner and we need your help to achieve this.  The above-mentioned appointment was missed and we do not have record of a cancellation by you.  Whenever possible, we request appointment cancellations at least 72 hours in advance.  This time allows us to offer the appointment to another patient in need.      If you feel you have received this letter in error, or if you need to reschedule this appointment, please call our office so that we may update our records.      Sincerely,    [facility]

## 2021-06-19 NOTE — LETTER
Letter by Magalie Ruiz MD at      Author: Magalie Ruiz MD Service: -- Author Type: --    Filed:  Encounter Date: 2019 Status: Signed         Kayden Cortez  [address] [place] [address]      2019      Dear Kayden Cortez,   : 1970      This letter is in regards to the appointment that you had scheduled on 2019 at the [facility] with Dr. Ruiz.     The [facility] strives to see all patients in a timely manner and we need your help to achieve this.  The above-mentioned appointment was missed and we do not have record of a cancellation by you.  Whenever possible, we request appointment cancellations at least 72 hours in advance.  This time allows us to offer the appointment to another patient in need.      If you feel you have received this letter in error, or if you need to reschedule this appointment, please call our office so that we may update our records.      Sincerely,    [facility]

## 2021-06-21 NOTE — PROGRESS NOTES
"DIAGNOSIS:  1. Bipolar Disorder  Ambulatory referral to Psychiatry    HM1(CBC and Differential)    HM1 (CBC with Diff)   2. Coronary atherosclerosis  atenolol (TENORMIN) 25 MG tablet    nitroglycerin (NITROSTAT) 0.4 MG SL tablet   3. Vitamin D deficiency  cholecalciferol, vitamin D3, (VITAMIN D3) 2,000 unit capsule   4. Health care maintenance  folic acid (FOLVITE) 400 MCG tablet    Influenza, Recombinant, Inj, Quadrivalent, PF, 18+YRS    Vitamin D, Total (25-Hydroxy)   5. Lower back pain  gabapentin (NEURONTIN) 300 MG capsule   6. Insomnia  hydrOXYzine HCl (ATARAX) 50 MG tablet   7. Asthma, mild intermittent  albuterol (PROAIR HFA;PROVENTIL HFA;VENTOLIN HFA) 90 mcg/actuation inhaler    mometasone-formoterol (DULERA) 100-5 mcg/actuation HFAA inhaler   8. Calderon's esophagus  omeprazole (PRILOSEC) 40 MG capsule   9. Hypercholesterolemia  simvastatin (ZOCOR) 40 MG tablet    Lipid Profile    Comprehensive Metabolic Panel   10. Pain of left upper arm  Ambulatory referral to Orthopedics       PLAN:  Patient Instructions   Flu vaccine    Non-fasting labs    Med refills    Psych referral    Refer to [facility] (Dr Isai Weinstein) in follow up        HPI:  H/o \"snapped tendon\" in the  Left arm for which he had surgery about a year ago.  Last 2 months the left arm started aching and getting sore.  If arm in a certain position then gets stingers in the upper arm, mostly occurs with twisting of the left forearm.  No pain that comes with neck movements.          Current Outpatient Prescriptions on File Prior to Visit   Medication Sig Dispense Refill     aspirin 325 MG tablet Take 325 mg by mouth daily.       [DISCONTINUED] albuterol (PROVENTIL HFA;VENTOLIN HFA) 90 mcg/actuation inhaler Inhale 2 puffs every 4 (four) hours as needed for wheezing.       [DISCONTINUED] atenolol (TENORMIN) 25 MG tablet TAKE ONE TABLET BY MOUTH ONE TIME DAILY IN THE EVENING.  30 tablet 2     [DISCONTINUED] cholecalciferol, vitamin D3, (VITAMIN D3) " 2,000 unit cap Take 2 capsules orally daily for 2 months and then one capsule daily thereafter 60 each 11     [DISCONTINUED] dicyclomine (BENTYL) 20 mg tablet Take 1 tablet (20 mg total) by mouth 2 (two) times a day. 20 tablet 0     [DISCONTINUED] folic acid (FOLVITE) 400 MCG tablet TAKE TWO TABLETS BY MOUTH DAILY  60 tablet 2     [DISCONTINUED] gabapentin (NEURONTIN) 300 MG capsule Take 1 capsule (300 mg total) by mouth 2 (two) times a day. 60 capsule 11     [DISCONTINUED] hydrOXYzine (ATARAX) 50 MG tablet Take 1 tablet (50 mg total) by mouth bedtime. 30 tablet 11     [DISCONTINUED] LATUDA 80 mg Tab   2     [DISCONTINUED] mometasone-formoterol (DULERA) 100-5 mcg/actuation HFAA inhaler Inhale 2 puffs 2 (two) times a day. 1 Inhaler 5     [DISCONTINUED] nitroglycerin (NITROSTAT) 0.4 MG SL tablet Place 0.4 mg under the tongue every 5 (five) minutes as needed for chest pain.       [DISCONTINUED] omeprazole (PRILOSEC) 40 MG capsule Take 1 capsule (40 mg total) by mouth bedtime. 90 capsule 3     [DISCONTINUED] ondansetron (ZOFRAN, AS HYDROCHLORIDE,) 4 mg/5 mL solution Take 2.5 mL (2 mg total) by mouth 3 (three) times a day as needed for nausea. 10 mL 0     [DISCONTINUED] simvastatin (ZOCOR) 40 MG tablet Take 1 tablet (40 mg total) by mouth bedtime. 90 tablet 0     pediatric multivitamin (FLINTSTONES) Chew chewable tablet Chew.       No current facility-administered medications on file prior to visit.        Pmh: reviewed  Psh: reviewed  Allergy:  reviewed      EXAM:    /84 (Patient Site: Left Arm, Patient Position: Sitting, Cuff Size: Adult Large)  Pulse (!) 107  Temp 97.5  F (36.4  C) (Oral)   Resp 20  Wt (!) 232 lb 6.4 oz (105.4 kg)  BMI 29.84 kg/m2   Wt Readings from Last 3 Encounters:   10/30/18 (!) 232 lb 6.4 oz (105.4 kg)   02/13/18 (!) 280 lb (127 kg)   07/31/17 (!) 240 lb 6.4 oz (109 kg)      GEN:   ALERT, NAD, ORIENTED TIMES THREE  NECK: SUPPLE WITHOUT ADENOPATHY OR THYROMEGALY  LUNGS: CTA  COR: RRR  WITHOUT MURMUR  SKIN: NO RASH , ULCERS OR LESIONS NOTED  MS: can abduct left arm to horizontal but with pain.  tx over the anterior aspect of the left shoulder  EXT: WITHOUT EDEMA OR SWELLING  NEURO:  Absent DTRS of the UE    No results found for this or any previous visit (from the past 168 hour(s)).       Results for orders placed or performed in visit on 11/01/16   Comprehensive Metabolic Panel   Result Value Ref Range    Sodium 136 136 - 145 mmol/L    Potassium 4.5 3.5 - 5.0 mmol/L    Chloride 102 98 - 107 mmol/L    CO2 27 22 - 31 mmol/L    Anion Gap, Calculation 7 5 - 18 mmol/L    Glucose 81 70 - 125 mg/dL    BUN 13 8 - 22 mg/dL    Creatinine 1.00 0.70 - 1.30 mg/dL    GFR MDRD Af Amer >60 >60 mL/min/1.73m2    GFR MDRD Non Af Amer >60 >60 mL/min/1.73m2    Bilirubin, Total 0.5 0.0 - 1.0 mg/dL    Calcium 9.6 8.5 - 10.5 mg/dL    Protein, Total 6.8 6.0 - 8.0 g/dL    Albumin 3.9 3.5 - 5.0 g/dL    Alkaline Phosphatase 67 45 - 120 U/L    AST 21 0 - 40 U/L    ALT 30 0 - 45 U/L     Vitamin D, Total (25-Hydroxy)   Date Value Ref Range Status   09/22/2014 26.7 (L) 30.0 - 80.0 ng/mL Final      PHQ-9:  14  ACT:  21

## 2021-06-25 NOTE — TELEPHONE ENCOUNTER
"Who is calling:  Patient is calling. He said he got a not to return to work note from Dr. Constantino. He actually brought in a form for him to sign, and patient said he lost it. So, needs something from Dr. Constantino in order to go back to work. I certify in my professional opinion, that applicant is handicapped/disabled. Signature Name, title telephone and date. Patient is not returning to work, he is disabled.  Can he write a letter for him and fax it to: [facility] Attention: \"[employer]\" Case # [id number].  Fax: [phone].  Please fax ASAP or by Monday morning, 3/18.  Reason for Call:  See above   Date of last appointment with primary care: NA  Okay to leave a detailed message: Yes       "

## 2021-06-25 NOTE — TELEPHONE ENCOUNTER
Spoke to pt and he needs the medical opinion form filled out. I asked pt to please have them fax us a copy of the form and we can fill it out for him. Will watch for Form

## 2021-06-25 NOTE — TELEPHONE ENCOUNTER
Attempted to call pt back but number is not in service. If pt calls back please relay MD message below and help schedule appt if needed

## 2021-06-25 NOTE — TELEPHONE ENCOUNTER
Patient calling stating he is at treatment at [facility] in [facility].     Patient states that he needs something for his anxiety and to help him sit through class. Patient cant take Xanax, Adderall, hydroxyzine, ativan, or anything like it.     Patient needs the Rx to go to [facility].     Patient states he would like to be notified once this is done. Please call the nurse line at [phone] ext [phone].    Patient state that he is really trying to get better this time and trying to succeed at this treatment center.     Please advise

## 2021-06-25 NOTE — TELEPHONE ENCOUNTER
Pt would need to be seen to initiate a new med, most likely an selective serotonin reuptake inhibitor.  Also if he is inpatient I would be unable to prescribe meds while he is under their care.  Sounds like the list of options is very limited and the SSRIs are not rapid in onset.  Really needs to be seen by someone to determine the best option (face to fface appt).

## 2021-06-25 NOTE — TELEPHONE ENCOUNTER
This is quite confusing.  If pt is to return to work I can enter a letter which could be printed.  If the pt feels he is disabled from working then I should see him for further evaluation at which time he should bring any necessary work forms requiring completion.

## 2021-07-03 NOTE — ADDENDUM NOTE
Addendum Note by Porsha Roth CMA at 6/12/2019 10:10 AM     Author: Porsha Roth CMA Service: -- Author Type: Certified Medical Assistant    Filed: 6/12/2019 10:50 AM Encounter Date: 6/12/2019 Status: Signed    : Porsha Roth CMA (Certified Medical Assistant)    Addended by: PORSHA ROTH on: 6/12/2019 10:50 AM        Modules accepted: Orders
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
## 2021-07-23 ENCOUNTER — HOSPITAL ENCOUNTER (EMERGENCY)
Facility: CLINIC | Age: 51
Discharge: HOME | End: 2021-07-23
Attending: FAMILY MEDICINE | Admitting: FAMILY MEDICINE
Payer: COMMERCIAL

## 2021-07-23 VITALS
HEART RATE: 79 BPM | TEMPERATURE: 98.2 F | OXYGEN SATURATION: 98 % | SYSTOLIC BLOOD PRESSURE: 126 MMHG | RESPIRATION RATE: 20 BRPM | DIASTOLIC BLOOD PRESSURE: 93 MMHG

## 2021-07-23 DIAGNOSIS — F32.1 CURRENT MODERATE EPISODE OF MAJOR DEPRESSIVE DISORDER, UNSPECIFIED WHETHER RECURRENT (H): ICD-10-CM

## 2021-07-23 LAB
AMPHETAMINES UR QL SCN: ABNORMAL
BARBITURATES UR QL: ABNORMAL
BENZODIAZ UR QL: ABNORMAL
CANNABINOIDS UR QL SCN: ABNORMAL
COCAINE UR QL: ABNORMAL
ETHANOL UR QL SCN: ABNORMAL
OPIATES UR QL SCN: ABNORMAL

## 2021-07-23 PROCEDURE — 99285 EMERGENCY DEPT VISIT HI MDM: CPT | Mod: 25 | Performed by: FAMILY MEDICINE

## 2021-07-23 PROCEDURE — 90791 PSYCH DIAGNOSTIC EVALUATION: CPT

## 2021-07-23 PROCEDURE — 99283 EMERGENCY DEPT VISIT LOW MDM: CPT | Performed by: FAMILY MEDICINE

## 2021-07-23 PROCEDURE — 80307 DRUG TEST PRSMV CHEM ANLYZR: CPT | Performed by: FAMILY MEDICINE

## 2021-07-23 RX ORDER — TRAZODONE HYDROCHLORIDE 50 MG/1
50-100 TABLET, FILM COATED ORAL
Qty: 14 TABLET | Refills: 0 | Status: SHIPPED | OUTPATIENT
Start: 2021-07-23

## 2021-07-23 NOTE — ED NOTES
If I am feeling unsafe or I am in a crisis, I will:  Contact my established care providers   Call the National Suicide Prevention Lifeline: [phone]   Go to the nearest emergency room   Call 911     Warning signs that I or other people might notice when a crisis is developing for me: any suicidal ideation with intent and plan    Things I am able to do on my own to cope or help me feel better: abstaining from substance use    Things that I am able to do with others to cope or help me better: attending my AA/NA meetings and going to [employer]    Changes I can make to support my mental health and wellness: follow up with Cayla Celis for continued chemical dependency treatment and [facility] worker Omayra.    People in my life that I can ask for help: [facility] staff and Omayra Mann,  at [facility] has a mental health crisis team you can call 24/7:    [facility] mental health emergencies  If you re in a mental health crisis or know someone who is, we can help.    The [place] mobile crisis teams can come to where you are. The teams respond to anyone in the [facility] who needs an urgent response.    If the situation is life-threatening or you need immediate response call 911.    Adults 18 and over  Call [phone].    Children 17 and under  Call [phone].    Call **CRISIS ([phone]) from anywhere in the [facility] to reach the local [facility] crisis team.    Crisis services  Available 24 hours a day, seven days a week, 365 days a year  Come to your home, school or other public place  Calm the situation and help you to decide what to do next  Offer other types of help depending on your situation  Talk through ways to support someone you know, who s in crisis    Other things that are important when I m in crisis: follow up with therapy referral and prescription for Trazodone to help with sleep.    Additional resources and information: you were given resources for substance use  treatment, Rule 25, and detox facilities.

## 2021-07-23 NOTE — ED NOTES
7/23/2021  Kayden Cortez 1970     [facility] Crisis Assessment:    Started at: 4:30pm  Completed at: 5:00pm  Patient was assessed via in-person.    Chief Complaint and History of Presenting Problem:  Patient is a 51 year old male who presented to the ED by [facility] related to concerns for suicidal ideation and substance abuse.     Psychotherapy techniques or interventions utilized throughout assessment include: Establishing rapport, Active listening, Assess dimensions of crisis, Apply solution-focused therapy to address current crisis, Identify additional supports and alternative coping skills, Establish a discharge plan, Motivational Interviewing, Brief Supportive Therapy, Trauma-Informed Care and Safety planning    Background  Patient reports he is homeless and has been living on the streets.  Patient reports he is unemployed due to chronic pain.  Patient reports he has discs in his neck and back that need to be fused.  Patient reports he has applied for disability but has been declined.  Patient reports his highest level of education is high school graduate.  Patient reports he is current legal concerns due to theft charges.    Mental Health History and Current Symptoms   Patient identifies historical diagnoses of polysubstance abuse, PTSD, and bipolar disorder.     Current Providers  Primary Care Provider: Yes Dr. Aly Constantino MD at [facility]  Psychiatrist: No, medications are managed by primary care provider.  Therapist: No  : No  ARMHS: Yes Omayra Mann at [facility].  ACT Team: No  Other: No    Has an JACE been signed? Yes ; By: Kayden Cortez; Relationship to patient: self.     History of psychiatric hospitalizations? Yes patient reports admission at [facility] following overdose 1 year ago, unable to locate record of this encounter.  History of civil commitment? No  History of programmatic care? Yes patient has been in  "numerous chemical dependency treatments, currently enrolled in residential at [facility] for the past 1 week.  Previously patient was at [facility], \"all over \".  Current psychotropic medications? Yes  Medication Compliant? Yes  Recent medication changes? Yes    Relevant Medical Concerns  Patient identifies concerns with completing ADLs? Yes  Patient can ambulate independently? Yes  Other medical health concerns? Yes patient's electronic medical record indicates history of chronic pain.  History of concussion or TBI? Yes Patient endorses history of TBI.  Patient reports 2 concussions with loss of consciousness during childhood.     Abuse, Maltreatment, and Trauma History  Physical abuse: No  Emotional/psychological abuse: No  Sexual abuse: No  Loss of a friend or family member to suicide: No  Other identified traumatic event or significant stressor: Yes patient reports his adult child's mother passed away from unknown causes last night.    Current Symptoms  Attention, Hyperactivity, and Impulsivity: Yes: Disorganized/Forgetful and Impulsive   Anxiety:Yes: Generalized Symptoms: Avoidance    Behavioral Difficulties: Yes: Apathy and Withdrawal/Isolation   Mood Symptoms: Yes: Crying or feels like crying, Feelings of helplessness , Feelings of hopelessness , Feelings of worthlessness , Impaired decision making , Loss of interest / Anhedonia , Sad, depressed mood , Sleep disturbance  and Thoughts of suicide/death    Appetite: Yes: Loss of Appetite   Feeding and Eating: No  Interpersonal Functioning: Yes: Emotional Deregulation and Impaired Impulse Control  Learning Disabilities/Cognitive/Developmental Disorders: No   General Cognitive Impairments: Yes: Decision-Making and Judgment/Insight  If yes, see completed Mini-Cog Assessment below.  Sleep: Yes: Difficulty falling asleep  and Difficulty staying sleep    Psychosis: Yes: Hallucinations: Auditory and Other: potentially substance induced    Trauma: No " "    Substance Use  Patient does  have a history of substance use which includes Substance #1: Name(s): methamphetamine Frequency: used twice over the course of 2 days Quantity: 8 ball Last use: 2 weeks ago Method: smoke and injection. Patient has participated in chemical dependency treatment or detox multiple times, including currently.     Patient has recently completed a drug screen or BAL/Breathalyzer? Yes positive for amphetamines.    History of Suicidal Ideation, Suicide Attempts, and Risk Formulation:   Patient does  have a history of suicidal ideation beginning a few days ago. Patient is not able to identify triggers to suicidal ideation. Patient does  acknowledge a history of suicide attempts. Previous attempts include overdosing on \"dope\" and Suboxone 1 year ago. Patient does  have a history of self-injurious behavior. Patient identifies self-injury via the following methods: overuse of alcohol/drugs.    ESS-6  1.a. Over the past 2 weeks, have you had thoughts of killing yourself? Yes   1.b. Have you ever attempted to kill yourself and, if yes, when did this last happen? Yes 1 year ago by overdose on \"dope\" and Suboxone.  2. Recent or current suicide plan? No  3. Recent or current intent to act on ideation? No  4. Lifetime psychiatric hospitalization? Yes  5. Pattern of excessive substance use? Yes  6. Current irritability, agitation, or aggression? No  ESS-6 Score: Moderate    Patient identifies current suicidal ideation which includes wishing he could throw in the towel and end it all. Patient reports onset of ideation was a few days ago. Patient does not have an identified plan or intent for suicide.  Patient reports he does not want to die and feels as though he can keep himself safe.    Current risk factors for suicide include history of suicide attempt(s), recent traumatic experience, chronic pain, recent death of loved one, difficulty accessing medical/mental health care and history of or current " substance use. Protective factors against suicide include positive working relationship with existing medical/mental health providers and identification of future goals.    Other Risk Areas  Aggressive/assumptive/homicidal risk factors: No   Duty to warn?No   Was a Child Protection Report Made? No   Was a Adult Protection Report Made? No      Sexually inappropriate behavior? No      Vulnerability to sexual exploitation? No     Mental Status Exam:  Affect: Flat  Appearance: Disheveled   Attention Span/Concentration: Inattentive    Eye Contact: Avoidant and Other: eyes closed due to dosing off throughout assessment.  Fund of Knowledge: Appropriate   Language /Speech Content: Fluent  Language /Speech Volume: Soft   Language /Speech Rate/Productions: Minimally Responsive and Slow   Recent Memory: Intact  Remote Memory: Intact  Mood: Depressed   Orientation:   Person: Yes   Place: Yes  Time of Day: Yes   Date: Yes   Situation (Do they understand why they are here?): Yes   Psychomotor Behavior: Underactive   Thought Content: Hallucinations and Suicidal  Thought Form: Intact    Clinical Summary and Disposition  Clinical summary:     Patient is alert, oriented x 4, adequately dressed and groomed, speech is articulate, minimally responsive, normal rate and volume, mood is depressed, affect is flat, thought process organized, content without observed or reported psychosis, cooperative with the assessment process.       Patient presents from [facility] where he has been for the past 1 week.  Patient is in residential chemical dependency treatment for methamphetamine abuse.  Patient reports he had been sober for 60 days, relapsed 2 weeks ago.  Patient reports using methamphetamine again for 2 days at that time.  Patient reports shooting up and smoking an 8 ball per day for those 2 days.  Patient denies any substance use in the past 2 weeks since.  Patient's U tox was positive for amphetamines so patient's  "timeline is likely inaccurate. Patient cites additional stressor of finding out that the mother of his 28-year-old daughter passed away last night.  Patient reports that he does not know the cause of death.     Patient was talking to his counselor today who told him to go to the emergency department for evaluation. Patient reports he has felt \"really suicidal\" for the past few days.  Patient reports symptoms of depression including crying over anything, feeling lethargic all the time, falling asleep during conversation, isolating.  Patient endorses suicidal ideation includes thoughts to throw in the towel, fuck it all, and end it all.  Patient reports he does not know why he feels this way and that if he knew he would fix it.  Patient denies any specific plan for suicide.  Patient reports he does not want to die, wants to \"save his life\". Patient believes he can keep himself safe though appears to lack adequate coping skills.  Patient denies any self-injurious behavior.  Patient denies any homicidal ideation, intent, or plan.      Patient's strengths, protective factors, and community resources include primary care, compliance with medication regiment, AA/NA meetings, and residential chemical dependency treatment. Areas of vulnerability for this patient are mental health and substance use history.     Diagnosis:  F15.20 Other stimulant dependence, uncomplicated, by history.  F33.9. Major depressive disorder, recurrent, unspecified, by history.  F43.10, Post-traumatic stress disorder, unspecified, by history.    Disposition:  Attending provider, Dr. Allen Palomino consulted and does agree with recommended disposition which includes Individual Therapy and Substance Abuse Disorder Treatment. Patient agrees with recommended level of care.      Details of final disposition include: Individual therapy  and Substance abuse disorder treatment .  Spoke with staff member Anil at [facility] who reports they " will send staff person Adonis to discharge patient.  Patient will return to their [facility] building.  Patient to continue with AA and NA meetings. Patient to follow up with therapy appointment scheduled on 7/29/2021 at 9am and prescription for Trazodone to help with sleep.    If Inpatient, is patient admitted voluntary? N/A   Patient aware of potential for transfer if there is not appropriate placement? NA  Patient is willing to travel outside of the [facility] for placement? NA   Central Intake Notified? NA    Safety and After Care Planning:        Safety Plan Provided? Yes:     If I am feeling unsafe or I am in a crisis, I will:  Contact my established care providers   Call the National Suicide Prevention Lifeline: 159.848.3988   Go to the nearest emergency room   Call 911      Warning signs that I or other people might notice when a crisis is developing for me: any suicidal ideation with intent and plan     Things I am able to do on my own to cope or help me feel better: abstaining from substance use     Things that I am able to do with others to cope or help me better: attending my AA/NA meetings and going to [facility]     Changes I can make to support my mental health and wellness: follow up with [facility]la [facility] for continued chemical dependency treatment and [facility] worker Omayra.     People in my life that I can ask for help: c[facility]la Cel[facility] staff and Omayra Mann,  at [facility]     Your [facility] has a mental health crisis team you can call 24/7:     [facility] mental health emergencies  If you re in a mental health crisis or know someone who is, we can help.     The [place] mobile crisis teams can come to where you are. The teams respond to anyone in the [facility] who needs an urgent response.     If the situation is life-threatening or you need immediate response call 911.     Adults 18 and over  Call [phone].     Call **CRISIS ([phone]) from anywhere in the [place] [facility] to reach the local [place] crisis  team.     Crisis services  Available 24 hours a day, seven days a week, 365 days a year  Come to your home, school or other public place  Calm the situation and help you to decide what to do next  Offer other types of help depending on your situation  Talk through ways to support someone you know, who s in crisis     Other things that are important when I m in crisis: follow up with therapy referral on 7/29/2021 at 9am and prescription for Trazodone to help with sleep.     Additional resources and information: you were given resources for substance use treatment, Rule 25, and detox facilities.    Follow-Up Plan:  After care plan provided to the patient/guardian by: DEC coordinator.  After care plan provided to any additional sources/parties? No    Duration of face to face time with patient in minutes: .50 hrs    CPT code(s) utilized: 57295 - Psychotherapy for Crisis - 60 (30-74*) min    JULIUS Smith LICSW

## 2021-07-23 NOTE — ED NOTES
Pt is [facility] in [facility].  States his last use 1 week ago.

## 2021-07-23 NOTE — ED TRIAGE NOTES
Patient currently in treatment at [facility] for meth use. Patient has been having suicidal thoughts for the past couple weeks. Patient was talking to his counselor today who told him to go to the emergency department for evaluation.

## 2021-07-23 NOTE — DISCHARGE INSTRUCTIONS
10 you with your chemical dependency treatment program and regular medications.  Use trazodone prescribed for sleep as needed.  Follow-up with therapy referral provided.    Indications for return to Emergency Department or to seek further assistance:  Thoughts of harm to self or others that you feel you may act on  Thoughts of suicide  Feeling unsafe  Major changes in mood, sleep or apetite  Difficulty concentrating or completing regular daily tasks/ functions  Feelings of paranoia, or feelings that you are losing touch with reality  Resources for Mental Health:  *(asterisk indicates free service)    *National Suicide Prevention Lifeline  1-216.388.5846     *[facility]'s National Help Line  (Treatment Referral Routing for Mental & Chemical Health)  1-585.939.6136      *Walk-In Counseling Center- [facility]  [address], [place]  [phone]  Mon, Wed, & Fri 1PM-3PM  Mon, Tues, Wed, & Thu 630PM-830PM  (no appointment needed)    *Walk-In Counseling Center- [facility]  [address], [place], [place]  Tue & Thu 6PM-8PM  (no appointment needed)    *[address], [place], [place]  [phone]  [email or web address]      [facility] Crisis Lines    [facility] Crisis Line  [phone] [facility] Crisis Line  [phone]   [facility] Crisis Line  [phone] [facility] Crisis Line  [phone]   [facility] Crisis Line  [phone] [facility] Crisis Line  [phone] for 8AM-430PM  [phone] for 430PM-8AM   [facility] Crisis Line  [phone] [facility] Crisis [facility]  [phone]     Outpatient Mental Health Clinics   *[facility]  [address] [place], [place]  [phone] *[facility]  [address], [place]  [phone]    *[facility] ([facility])  [address] [place], [place]  [phone] Health Counseling Services  [address] [place] [address]  [phone]    [facility]  [address]  [place], [place]  [phone] [facility]  [address]  [place], [place]  [phone]   [facility]  [address], [place]  [phone] [facility]  [address]  [place], [place]  [phone]   [facility] (for [facility])  [address], [place]  [phone] [facility]  [address]  [place], [place]   [phone]   [facility]  [address] [facility] [address]   [phone] Behavioral Health and Wellness Clinic  [address] [address] [phone]   *[facility] Crisis: COPE: ([phone]) 24 hour mobile crisis support for people having a mental health crisis in [facility].   *Acute Psychiatric Services ([phone]). 24-hour walk-in crisis psychiatric support at [facility]; Emergency Medications Clinic available 7:30am - 2:00pm  *Crisis Connection: ([phone]) 24-hour confidential telephone counseling   *[facility] Emergency Room: [phone]  *[place] [facility] ([facility]) : [facility] connects people seeking recovery to resources that help foster and sustain long-term recovery. Whether you are seeking resources for treatment, transportation, housing, job training, education, health or other pathways to recovery, [facility] is a great place to start. [phone] [email or web address]

## 2021-07-23 NOTE — ED PROVIDER NOTES
[facility] EMERGENCY DEPARTMENT ([facility])     July 23, 2021    History     Chief Complaint   Patient presents with     Suicidal     Patient currently in treatment. Facility asked patient to present today for suicidal thoughts. Patient denies having a plan.     Kayden Cortez is a 51 year old male who has a PMH of CAD,3x  MI, GERD, HLD, asthma, PTSD, bipolar disorder, depression, opiate use disorder, methamphetamine use disorder, s/p appendectomy, s/p cholecystectomy who presents to the Emergency Department for suicidal ideation.  He is currently in treatment at [facility] for methamphetamine use.  He has been having suicidal thoughts for the past few weeks and was meeting with his counselor today who told him to go to the ED for evaluation of SI.      PAST MEDICAL HISTORY:   Past Medical History:   Diagnosis Date     Asthma      Bipolar depression (H)      CAD (coronary artery disease) 2000     Coronary artery disease      Depressive disorder      GERD (gastroesophageal reflux disease)      Hypercholesteremia      Myocardial infarction (H)     3 MI's     PTSD (post-traumatic stress disorder)      Uncomplicated asthma        PAST SURGICAL HISTORY:   Past Surgical History:   Procedure Laterality Date     APPENDECTOMY       ARTHROSCOPY ANKLE Left 4/16/2015    Procedure:  ANKLE ARTHROSCOPY DEBRIDEMENT SYNOVECTOMY;  Surgeon: Raleigh Bob DPM;  Location: [facility];  Service:      ARTHROSCOPY SHOULDER ROTATOR CUFF REPAIR Right      BACK SURGERY  2000    Shaved cysst on spine     C APPENDECTOMY      Description: Appendectomy;  Recorded: 08/09/2011;     C LAP,CHOLECYSTECTOMY/EXPLORE      Description: Cholecystectomy Laparoscopic;  Recorded: 08/09/2011;  Comments: ABOUT 1995     CARDIAC CATHETERIZATION       CHOLECYSTECTOMY       IR LUMBAR EPIDURAL STEROID INJECTION  4/20/2011     IR LUMBAR EPIDURAL STEROID INJECTION  5/5/2011     LUMBAR SPINE SURGERY       ORTHOPEDIC SURGERY      Bilateral rotator  "cuff.  Left shoulder tendon repair.  right foot twice.     OTHER SURGICAL HISTORY Right     ANKLESURGERY       Past medical history, past surgical history, medications, and allergies were reviewed with the patient. Additional pertinent items: None    FAMILY HISTORY:   Family History   Problem Relation Age of Onset     Diabetes Mother      Diabetes Father      Cancer Father         CANCER       SOCIAL HISTORY:   Social History     Tobacco Use     Smoking status: Current Every Day Smoker     Packs/day: 0.75     Types: Cigarettes     Smokeless tobacco: Never Used     Tobacco comment: also using e-cigarette   Substance Use Topics     Alcohol use: No     Comment: Alcoholic Drinks/day: \"rarely\"     Social history was reviewed with the patient. Additional pertinent items: None      Discharge Medication List as of 7/23/2021  6:16 PM      START taking these medications    Details   traZODone (DESYREL) 50 MG tablet Take 1-2 tablets ( mg) by mouth nightly as needed for sleep, Disp-14 tablet, R-0, Local Print         CONTINUE these medications which have NOT CHANGED    Details   ALBUTEROL IN Inhale 2 puffs into the lungs every 4 hours as needed, Historical      aspirin (ASA) 325 MG tablet Take 325 mg by mouth daily, Historical      atenolol (TENORMIN) 25 MG tablet Take 25 mg by mouth daily, Historical      budesonide-formoterol (SYMBICORT) 160-4.5 MCG/ACT Inhaler Inhale 1 puff into the lungs 2 times daily, Historical      Cholecalciferol (VITAMIN D3) 50 MCG (2000 UT) CAPS Take 2,000 Units by mouth daily, Historical      gabapentin 300 MG PO capsule Take 1 capsule (300 mg) by mouth 3 times daily, Disp-90 capsule, R-1, E-Prescribe      lurasidone (LATUDA) 80 MG TABS tablet Take 80 mg by mouth At Bedtime, Historical      nitroGLYcerin (NITROSTAT) 0.4 MG sublingual tablet Place 0.4 mg under the tongue as needed, Historical      diazepam (VALIUM) 5 MG tablet One tablet one half hour before MRI. May take second tablet if " needed, Disp-2 tablet, R-0, E-Prescribe                Allergies   Allergen Reactions     Vicodin [Hydrocodone-Acetaminophen] Nausea        Review of Systems  A complete review of systems was performed with pertinent positives and negatives noted in the HPI, and all other systems negative.    Physical Exam   BP: (!) 126/93  Pulse: 79  Temp: 98.2  F (36.8  C)  Resp: 20  SpO2: 98 %      Physical Exam  Vitals and nursing note reviewed.   Constitutional:       General: He is not in acute distress.     Appearance: He is not diaphoretic.   HENT:      Head: Atraumatic.   Eyes:      General: No scleral icterus.     Pupils: Pupils are equal, round, and reactive to light.   Cardiovascular:      Heart sounds: Normal heart sounds.   Pulmonary:      Effort: No respiratory distress.      Breath sounds: Normal breath sounds.   Abdominal:      General: Bowel sounds are normal.      Palpations: Abdomen is soft.      Tenderness: There is no abdominal tenderness.   Musculoskeletal:         General: No tenderness.   Skin:     General: Skin is warm.      Findings: No rash.         ED Course        Procedures       The medical record was reviewed and interpreted.  Current labs reviewed and interpreted.  Managed outpatient prescription medications.          Results for orders placed or performed during the hospital encounter of 07/23/21 (from the past 24 hour(s))   Drug abuse screen 6 urine (tox)   Result Value Ref Range    Amphetamines Urine Screen Positive (A) Screen Negative    Barbiturates Urine Screen Negative Screen Negative    Benzodiazepines Urine Screen Negative Screen Negative    Cannabinoids Urine Screen Negative Screen Negative    Cocaine Urine Screen Negative Screen Negative    Ethanol Urine Screen Negative Screen Negative    Opiates Urine Screen Negative Screen Negative     Medications - No data to display          Assessments & Plan (with Medical Decision Making)   51-year-old male who presented complaining of depressive  symptoms.  He has a history of polysubstance abuse, currently in chemical dependency treatment, depression and PTSD.  Presented after he admitted to staff that he was having passive thoughts about death and suicide.The patient was also seen by the [facility] , please refer to their extensive note/evaluation which was reviewed with me and is documented in EPIC on 7/23/2021 for further details.    Here he is depressed appearing but cooperative.  He denies an actual intent for suicide.  He admits he relapsed on methamphetamine as recently as 3 or 4 days ago and has been more depressed afterwards.  He also experienced personal loss in the death of the mother of his daughter.  This is causing him to think a lot about death.  He is sleeping poorly.  He would like to go back to his chemical dependency treatment program and believes he can contract for safety.  He does not appear acutely intoxicated and there is no sign of psychosis.  He does not have active intent to harm himself or others.  We will add trazodone to his medications to assist him with sleep.  He will follow-up with therapy and his outpatient medication provider.  We discussed the indications for emergency department return and follow-up.  Stable for discharge.    I have reviewed the nursing notes.    I have reviewed the findings, diagnosis, plan and need for follow up with the patient.    Discharge Medication List as of 7/23/2021  6:16 PM      START taking these medications    Details   traZODone (DESYREL) 50 MG tablet Take 1-2 tablets ( mg) by mouth nightly as needed for sleep, Disp-14 tablet, R-0, Local Print             Final diagnoses:   Current moderate episode of major depressive disorder, unspecified whether recurrent (H)       7/23/2021   [facility] EMERGENCY DEPARTMENT

## 2021-08-16 ENCOUNTER — TELEPHONE (OUTPATIENT)
Dept: FAMILY MEDICINE | Facility: CLINIC | Age: 51
End: 2021-08-16

## 2021-08-16 NOTE — TELEPHONE ENCOUNTER
Prior Authorization Request  Who s requesting:  Pharmacy  Pharmacy Name and Location: [facility]  Medication Name: omeprazole (PRILOSEC) 40 MG DR capsule  Insurance Plan:   Insurance Member ID Number:   CoverMyMeds Key: [id number]  Informed patient that prior authorizations can take up to 10 business days for response:   Yes  Okay to leave a detailed message: Yes

## 2021-08-17 NOTE — TELEPHONE ENCOUNTER
PA Initiation    Medication: omeprazole (PRILOSEC) 40 MG DR capsule  Insurance Company: [employer] - Phone [phone] Fax [phone]  Pharmacy Filling the Rx: [facility] [id number] - [place], [place] - [address] [place] [address], [address]  Filling Pharmacy Phone: [phone]  Filling Pharmacy Fax: [phone]  Start Date: 8/17/2021

## 2021-08-17 NOTE — TELEPHONE ENCOUNTER
Prior Authorization Approval    Authorization Effective Date:    Authorization Expiration Date:    Medication: omeprazole (PRILOSEC) 40 MG  capsule- APPROVED  Approved Dose/Quantity: 30 CAPS  Reference #: [id number]   Insurance Company: [employer] [facility] - Phone [phone] Fax [phone]  Expected CoPay:       CoPay Card Available:      Foundation Assistance Needed:    Which Pharmacy is filling the prescription (Not needed for infusion/clinic administered): [id number] - [place], [place] - [address] [place] [address], [address]  Pharmacy Notified:  YES  Patient Notified:  YES        Central Prior Authorization Team  Phone: [phone]

## 2021-10-05 VITALS
HEART RATE: 85 BPM | OXYGEN SATURATION: 94 % | TEMPERATURE: 98.1 F | HEIGHT: 74 IN | BODY MASS INDEX: 34.65 KG/M2 | WEIGHT: 270 LBS | RESPIRATION RATE: 16 BRPM | SYSTOLIC BLOOD PRESSURE: 127 MMHG | DIASTOLIC BLOOD PRESSURE: 81 MMHG

## 2021-10-05 PROCEDURE — 99284 EMERGENCY DEPT VISIT MOD MDM: CPT

## 2021-10-05 ASSESSMENT — MIFFLIN-ST. JEOR: SCORE: 2144.46

## 2021-10-06 ENCOUNTER — HOSPITAL ENCOUNTER (EMERGENCY)
Facility: HOSPITAL | Age: 51
Discharge: HOME | End: 2021-10-06
Attending: EMERGENCY MEDICINE | Admitting: EMERGENCY MEDICINE
Payer: COMMERCIAL

## 2021-10-06 ENCOUNTER — APPOINTMENT (OUTPATIENT)
Dept: RADIOLOGY | Facility: HOSPITAL | Age: 51
End: 2021-10-06
Attending: STUDENT IN AN ORGANIZED HEALTH CARE EDUCATION/TRAINING PROGRAM
Payer: COMMERCIAL

## 2021-10-06 DIAGNOSIS — M25.572 ACUTE LEFT ANKLE PAIN: ICD-10-CM

## 2021-10-06 PROCEDURE — 73610 X-RAY EXAM OF ANKLE: CPT | Mod: LT

## 2021-10-06 RX ORDER — NAPROXEN 500 MG/1
500 TABLET ORAL 2 TIMES DAILY WITH MEALS
Qty: 28 TABLET | Refills: 0 | Status: SHIPPED | OUTPATIENT
Start: 2021-10-06

## 2021-10-06 NOTE — ED PROVIDER NOTES
EMERGENCY DEPARTMENT ENCOUNTER      NAME: Kayden Cortez  AGE: 51 year old male  YOB: 1970  MRN: [id number]  EVALUATION DATE & TIME: 10/6/2021 1:14 AM     PCP: Aly Constantino    ED PROVIDER: Chauncey Duggan M.D.      Chief Complaint   Patient presents with     Ankle Pain         FINAL IMPRESSION:  1. Acute left ankle pain          ED COURSE & MEDICAL DECISION MAKIN:57AM I met with the patient for the initial interview and physical examination. Updated him on the results and plan for care. I discussed the plan for discharge with the patient, and patient is agreeable. We discussed supportive cares at home and reasons for return to the ER including new or worsening symptoms - all questions and concerns addressed. Patient to be discharged by RN. PPE: Provider wore gloves, goggles, and N95 mask.    Pertinent Labs & Imaging studies reviewed. (See chart for details)  51 year old male presents to the Emergency Department for evaluation of left ankle pain. Patient appears non toxic with stable vitals signs. Overall exam is benign.  Lungs are clear, abdomen is benign.  Patient is neurovascular intact with good distal sensation and pulses, he has no joint swelling, erythema or warmth, no skin changes, he endorses minor trauma but there is no gross deformities.  Considered but low suspicion for fracture or dislocation.  Certainly nothing to suggest compartment syndrome, open fracture, cellulitis, septic joint, DVT, knee fracture or dislocation, or other more malicious etiology of symptoms.  We did obtain plain films which reported no acute concerning findings.  Exam overall benign and feel the patient is safe for discharge and close follow-up.  Will discharge with a short course of naproxen and Aircast as needed for additional support.  Will recommend he follows up with his primary care provider in the next 5 to 7 days for continued outpatient management evaluation.  Discussed these findings and  recommendations with the patient felt reassured and comfortable with discharge.  Return precautions provided.        At the conclusion of the encounter I discussed the results of all of the tests and the disposition. The questions were answered and return precautions provided. The patient or family acknowledged understanding and was agreeable with the care plan.         MEDICATIONS GIVEN IN THE EMERGENCY:  Medications - No data to display    NEW PRESCRIPTIONS STARTED AT TODAY'S ER VISIT  Discharge Medication List as of 10/6/2021  1:16 AM      START taking these medications    Details   naproxen (NAPROSYN) 500 MG tablet Take 1 tablet (500 mg) by mouth 2 times daily (with meals), Disp-28 tablet, R-0, Local Print                  =================================================================    HPI    Patient information was obtained from: patient    Use of Intrepreter: N/A         Kayden Cortez is a 51 year old male with a pertinent history of chronic ankle pain, s/p left ankle arthroscopy debridement synovectomy (2015), nicotine dependence, opioid dependence, and methamphetamine abuse who presents to this ED as a walk in by self for evaluation of ankle pain.     Patient reports increasing left ankle pain after rolling his ankle in a hole 2 weeks ago. Pain is located on the lateral aspect, feels like a shooting pain, and is provoked with certain movements. Has been able to ambulate and bear weight on the ankle, although prolonged standing makes the pain worse. Has been taking tylenol at home without relief. Denies associated left knee pain, numbness, tingling, weakness, or skin changes. Otherwise denies fever, chills, chest pain, shortness of breath, nausea, vomiting, diarrhea, abdominal pain, stool changes, or any other complaints at this time. Notes allergy to Vicodin.     Social: Smokes 0.5 ppd. No current drug use (clean for 5 months). No current alcohol use (sober 20 years).      REVIEW OF SYSTEMS    Constitutional:  Denies fever, chills  Respiratory:  Denies productive cough or increased work of breathing  Cardiovascular:  Denies chest pain, palpitations  GI:  Denies abdominal pain, nausea, vomiting, or change in bowel or bladder habits   Musculoskeletal:  Reports left ankle pain (lateral). Denies left knee pain.   Skin:  Denies rash or skin changes  Neurologic:  Denies weakness, numbness, or tingling.   All systems negative except as marked.     PAST MEDICAL HISTORY:  Past Medical History:   Diagnosis Date     Asthma      Bipolar depression (H)      CAD (coronary artery disease) 2000     Coronary artery disease      Depressive disorder      GERD (gastroesophageal reflux disease)      Hypercholesteremia      Myocardial infarction (H)     3 MI's     PTSD (post-traumatic stress disorder)      Uncomplicated asthma        PAST SURGICAL HISTORY:  Past Surgical History:   Procedure Laterality Date     APPENDECTOMY       ARTHROSCOPY ANKLE Left 4/16/2015    Procedure:  ANKLE ARTHROSCOPY DEBRIDEMENT SYNOVECTOMY;  Surgeon: Raleigh Bob DPM;  Location: [facility];  Service:      ARTHROSCOPY SHOULDER ROTATOR CUFF REPAIR Right      BACK SURGERY  2000    Shaved cysst on spine     C APPENDECTOMY      Description: Appendectomy;  Recorded: 08/09/2011;     C LAP,CHOLECYSTECTOMY/EXPLORE      Description: Cholecystectomy Laparoscopic;  Recorded: 08/09/2011;  Comments: ABOUT 1995     CARDIAC CATHETERIZATION       CHOLECYSTECTOMY       IR LUMBAR EPIDURAL STEROID INJECTION  4/20/2011     IR LUMBAR EPIDURAL STEROID INJECTION  5/5/2011     LUMBAR SPINE SURGERY       ORTHOPEDIC SURGERY      Bilateral rotator cuff.  Left shoulder tendon repair.  right foot twice.     OTHER SURGICAL HISTORY Right     ANKLESURGERY         CURRENT MEDICATIONS:    Prior to Admission medications    Medication Sig Start Date End Date Taking? Authorizing Provider   naproxen (NAPROSYN) 500 MG tablet Take 1 tablet (500 mg) by mouth 2 times daily (with  meals) 10/6/21  Yes Chauncey Duggan MD   ALBUTEROL IN Inhale 2 puffs into the lungs every 4 hours as needed    Reported, Patient   aspirin (ASA) 325 MG tablet Take 325 mg by mouth daily    Reported, Patient   atenolol (TENORMIN) 25 MG tablet Take 25 mg by mouth daily 7/28/16   Reported, Patient   budesonide-formoterol (SYMBICORT) 160-4.5 MCG/ACT Inhaler Inhale 1 puff into the lungs 2 times daily 7/28/16   Reported, Patient   Cholecalciferol (VITAMIN D3) 50 MCG (2000 UT) CAPS Take 2,000 Units by mouth daily 10/30/18   Reported, Patient   diazepam (VALIUM) 5 MG tablet One tablet one half hour before MRI. May take second tablet if needed 2/3/20   Michele Escobar MD   gabapentin 300 MG PO capsule Take 1 capsule (300 mg) by mouth 3 times daily 2/21/20   Michele Escobar MD   lurasidone (LATUDA) 80 MG TABS tablet Take 80 mg by mouth At Bedtime 10/30/18   Reported, Patient   nitroGLYcerin (NITROSTAT) 0.4 MG sublingual tablet Place 0.4 mg under the tongue as needed 7/28/16   Reported, Patient   omeprazole (PRILOSEC) 40 MG DR capsule Take 1 capsule (40 mg) by mouth At Bedtime 8/16/21   Aly Constantino MD   traZODone (DESYREL) 50 MG tablet Take 1-2 tablets ( mg) by mouth nightly as needed for sleep 7/23/21   Allen Palomino MD        ALLERGIES:  Allergies   Allergen Reactions     Vicodin [Hydrocodone-Acetaminophen] Nausea       FAMILY HISTORY:  Family History   Problem Relation Age of Onset     Diabetes Mother      Diabetes Father      Cancer Father         CANCER       SOCIAL HISTORY:   Social History     Socioeconomic History     Marital status:      Spouse name: Not on file     Number of children: 1     Years of education: Not on file     Highest education level: Not on file   Occupational History     Not on file   Tobacco Use     Smoking status: Current Every Day Smoker     Packs/day: 0.5     Types: Cigarettes     Smokeless tobacco: Never Used     Tobacco comment: also using e-cigarette   Substance and  "Sexual Activity     Alcohol use: No     Comment: Sober for 20 years     Drug use: Not Currently     Types: Methamphetamines     Comment: 5 months clean     Sexual activity: Not Currently   Other Topics Concern     Not on file   Social History Narrative     Not on file     Social Determinants of Health     Financial Resource Strain:      Difficulty of Paying Living Expenses:    Food Insecurity:      Worried About Running Out of Food in the Last Year:      Ran Out of Food in the Last Year:    Transportation Needs:      Lack of Transportation (Medical):      Lack of Transportation (Non-Medical):    Physical Activity:      Days of Exercise per Week:      Minutes of Exercise per Session:    Stress:      Feeling of Stress :    Social Connections:      Frequency of Communication with Friends and Family:      Frequency of Social Gatherings with Friends and Family:      Attends Taoism Services:      Active Member of Clubs or Organizations:      Attends Club or Organization Meetings:      Marital Status:    Intimate Partner Violence:      Fear of Current or Ex-Partner:      Emotionally Abused:      Physically Abused:      Sexually Abused:        VITALS:  Patient Vitals for the past 24 hrs:   BP Temp Temp src Pulse Resp SpO2 Height Weight   10/05/21 2357 127/81 98.1  F (36.7  C) Oral 85 16 94 % 1.88 m (6' 2\") 122.5 kg (270 lb)        PHYSICAL EXAM    Constitutional:  Awake, alert, in no apparent distress  HENT:  Normocephalic, Atraumatic. Bilateral external ears normal. Oropharynx moist. Nose normal. Neck- Normal range of motion with no guarding, No midline cervical tenderness, Supple, No stridor.   Eyes:  PERRL, EOMI with no signs of entrapment, Conjunctiva normal, No discharge.   Respiratory:  Normal breath sounds, No respiratory distress, No wheezing.    Cardiovascular:  Normal heart rate, Normal rhythm, No appreciable rubs or gallops.   GI:  Soft, No tenderness, No distension, No palpable masses  Musculoskeletal:  " Intact distal pulses, No edema. Good range of motion in all major joints.  Focal tenderness along the left lateral malleolus, no gross deformities, no joint swelling, erythema or warmth.  Integument:  Warm, Dry, No erythema, No rash.   Neurologic:  Alert & oriented, Normal motor function, Normal sensory function, No focal deficits noted.   Psychiatric:  Affect normal, Judgment normal, Mood normal.     LAB:  All pertinent labs reviewed and interpreted.  Results for orders placed or performed during the hospital encounter of 10/06/21   XR Ankle Left G/E 3 Views    Impression    IMPRESSION: Mild tibiotalar degenerative spurring. No displaced fracture or dislocation.       RADIOLOGY:  XR Ankle Left G/E 3 Views   Final Result   IMPRESSION: Mild tibiotalar degenerative spurring. No displaced fracture or dislocation.             I, Marilee Nielsen, am serving as a scribe to document services personally performed by Chauncey Duggan MD, based on my observation and the provider's statements to me. I, Chauncey Duggan MD attest that Marilee Nielsen is acting in a scribe capacity, has observed my performance of the services and has documented them in accordance with my direction.    Chauncey Duggan M.D.  Emergency Medicine  [facility] EMERGENCY DEPARTMENT  [facility] [facility] [id number] [phone]  Dept: [phone]     Chauncey Duggan MD  10/06/21 0358

## 2021-10-07 ENCOUNTER — PATIENT OUTREACH (OUTPATIENT)
Dept: CARE COORDINATION | Facility: CLINIC | Age: 51
End: 2021-10-07

## 2021-10-07 DIAGNOSIS — Z71.89 OTHER SPECIFIED COUNSELING: ICD-10-CM

## 2021-10-07 NOTE — PROGRESS NOTES
Clinic Care Coordination Contact  [facility]/Voicemail       Clinical Data: Care Coordinator Outreach  Outreach attempted x 1.Unable to leave a message Mail box is full.  Care Coordinator will try to reach patient again in 1-2 business days.      Stephanie Slater  [phone]  Care

## 2021-10-08 NOTE — PROGRESS NOTES
Clinic Care Coordination Contact  [facility]       Clinical Data: Care Coordinator Outreach  Outreach attempted x 2.   Unable to leave message Mailbox is full.   Care Coordinator will do no further outreaches at this time.      Stephanie Slater  [phone]  Care

## 2022-03-20 ENCOUNTER — HOSPITAL ENCOUNTER (EMERGENCY)
Facility: HOSPITAL | Age: 52
Discharge: HOME | End: 2022-03-20
Attending: EMERGENCY MEDICINE | Admitting: EMERGENCY MEDICINE
Payer: COMMERCIAL

## 2022-03-20 VITALS
HEIGHT: 74 IN | BODY MASS INDEX: 33.37 KG/M2 | TEMPERATURE: 98 F | SYSTOLIC BLOOD PRESSURE: 134 MMHG | OXYGEN SATURATION: 96 % | DIASTOLIC BLOOD PRESSURE: 94 MMHG | RESPIRATION RATE: 18 BRPM | WEIGHT: 260 LBS | HEART RATE: 112 BPM

## 2022-03-20 DIAGNOSIS — M54.50 BILATERAL LOW BACK PAIN WITHOUT SCIATICA, UNSPECIFIED CHRONICITY: ICD-10-CM

## 2022-03-20 PROCEDURE — 99282 EMERGENCY DEPT VISIT SF MDM: CPT

## 2022-03-20 NOTE — ED TRIAGE NOTES
Missed work for a week d/t his chronic back pain. Did not see anyone but needs a return to work slip. Pt works at [employer] and work is requiring a return to work slip

## 2022-03-20 NOTE — ED PROVIDER NOTES
"EMERGENCY DEPARTMENT NOTE     Name: Kayden Cortez    Age/Sex: 51 year old male   MRN: [id number]   Evaluation Date & Time:  3/20/2022  3:33 PM    PCP:    Aly Constantino   ED Provider: Julio Cesar Hernandez D.O.       CHIEF COMPLAINT    Back Pain and Return to Work Slip       DIAGNOSIS & DISPOSITION     1. Bilateral low back pain without sciatica, unspecified chronicity      DISPOSITION: Home    At the conclusion of the encounter I discussed the results of all of the tests and the disposition. The questions were answered. The patient or family acknowledged understanding and was agreeable with the care plan.    TOTAL CRITICAL CARE TIME (EXCLUDING PROCEDURES): Not applicable    PROCEDURES:   None    EMERGENCY DEPARTMENT COURSE/MEDICAL DECISION MAKING   3:38 PM I met with the patient to gather history and to perform my initial exam.  We discussed treatment options and the plan for care while in the Emergency Department.  3:44 PM We discussed the plan for discharge and the patient is agreeable. Reviewed supportive cares, symptomatic treatment, outpatient follow up, and reasons to return to the Emergency Department. Patient to be discharged by ED RN.     Kayden Cortez is a 51 year old male with relevant past history of CAD, MI, GERD, and lower back pain who presents to the emergency department to receive a return to work slip. Patient has a history of chronic back pain for which he was going to have surgery for but due to COVID this was put off. Patient is now needing to go back to work and is requesting a return to work slip.     Triage note reviewed:Missed work for a week d/t his chronic back pain. Did not see anyone but needs a return to work slip. Pt works at [employer] and work is requiring a return to work slip    Vital signs:BP (!) 134/94   Pulse 112   Temp 98  F (36.7  C) (Oral)   Resp 18   Ht 1.88 m (6' 2\")   Wt 117.9 kg (260 lb)   SpO2 96%   BMI 33.38 kg/m    Pertinent physical exam findings:  Musculoskeletal: " No reducible reducible tenderness of the thoracic or lumbar spine  Neuro: L4 L5-S1 sensorimotor intact with 5 out of 5 motor strength with hip flexion, toe walking and heel standing  Diagnostic studies:  Imaging:  No orders to display      Lab:  Labs Ordered and Resulted from Time of ED Arrival to Time of ED Departure - No data to display   Interventions:None  Medical decision making: Patient has history of chronic lower back pain.  He has been following with [facility] and reports that surgery was considered but was distracted by the Covid pandemic.  Patient is currently scheduled follow-up for next week.  Patient has chronic pain has been well controlled with Tylenol and ibuprofen.  Reports no new neurologic symptoms.  Patient presented today for pain note allowing him to return to work and this was given.  Patient will be discharged and will follow up with [facility]    ED INTERVENTIONS   Medications - No data to display    DISCHARGE MEDICATIONS        Review of your medicines      UNREVIEWED medicines. Ask your doctor about these medicines      Dose / Directions   ALBUTEROL IN      Dose: 2 puff  Inhale 2 puffs into the lungs every 4 hours as needed  Refills: 0     aspirin 325 MG tablet  Commonly known as: ASA      Dose: 325 mg  Take 325 mg by mouth daily  Refills: 0     atenolol 25 MG tablet  Commonly known as: TENORMIN      Dose: 25 mg  Take 25 mg by mouth daily  Refills: 0     budesonide-formoterol 160-4.5 MCG/ACT Inhaler  Commonly known as: SYMBICORT      Dose: 1 puff  Inhale 1 puff into the lungs 2 times daily  Refills: 0     diazepam 5 MG tablet  Commonly known as: VALIUM  Used for: Cervical radiculopathy, Lumbar radiculopathy      One tablet one half hour before MRI. May take second tablet if needed  Quantity: 2 tablet  Refills: 0     gabapentin 300 MG capsule  Commonly known as: NEURONTIN  Used for: Cervical radiculopathy, Lumbar radiculopathy      Dose: 300 mg  Take 1 capsule (300 mg) by  mouth 3 times daily  Quantity: 90 capsule  Refills: 1     Latuda 80 MG Tabs tablet  Generic drug: lurasidone      Dose: 80 mg  Take 80 mg by mouth At Bedtime  Refills: 0     naproxen 500 MG tablet  Commonly known as: NAPROSYN      Dose: 500 mg  Take 1 tablet (500 mg) by mouth 2 times daily (with meals)  Quantity: 28 tablet  Refills: 0     nitroGLYcerin 0.4 MG sublingual tablet  Commonly known as: NITROSTAT      Dose: 0.4 mg  Place 0.4 mg under the tongue as needed  Refills: 0     omeprazole 40 MG DR capsule  Commonly known as: priLOSEC  Used for: Calderon's esophagus without dysplasia      Dose: 40 mg  Take 1 capsule (40 mg) by mouth At Bedtime  Quantity: 90 capsule  Refills: 1     traZODone 50 MG tablet  Commonly known as: DESYREL      Dose:  mg  Take 1-2 tablets ( mg) by mouth nightly as needed for sleep  Quantity: 14 tablet  Refills: 0     vitamin D3 50 MCG (2000 UT) Caps      Dose: 2,000 Units  Take 2,000 Units by mouth daily  Refills: 0              INFORMATION SOURCE AND LIMITATIONS    History/Exam limitations: None  Patient information was obtained from: Patient  Use of : N/A    HISTORY OF PRESENT ILLNESS   Kayden Cortez male with a relevant past history of CAD, GERD, MI, chronic low back pain, who presents to this ED via private auto to receive return to work slip.    Patient had persisting low back pain for the past week (since ~3/13) which has prevented him from going to work. He now reports symptoms have improved, but his work is requiring a return to work slip before he can continue working. Patient reports his low back pain is chronic and requiring surgery. Due to COVID, however, the surgery has been put off. The pain is localized to the lower back with no radiation into the upper back. He describes having back spasms that prevented him from standing for over 5 minutes. Patient denies any associated symptoms of incontinence or loss of bowel control. He further denies any  associated numbness or weakness in his lower extremities. Currently, patient is able to stand and is requesting an immediate return to work slip since he works at 4:00PM today. No other complaints at this time.     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       REVIEW OF SYSTEMS:   Constitutional: Negative for  fever.   HENT: Negative for URI symptoms or sore throat.    Cardiac: Negative for  chest pain,palpitations, near syncope or syncope  Respiratory: Negative for cough and shortness of breath.    Gastrointestinal: Negative for abdominal pain, nausea, vomiting, constipation, diarrhea, rectal bleeding or melena.  Genitourinary: Negative for dysuria, flank pain, incontinence, and hematuria.   Musculoskeletal: Positive for low back pain (since improved).   Skin: Negative for  rash  Neurological: Negative for dizziness, headache, syncope, speech difficulty, unilateral weakness or imbalance with walking.   Hematological: Negative for adenopathy. Does not bruise/bleed easily.   Psychiatric/Behavioral: Negative for confusion.       PATIENT HISTORY     Past Medical History:   Diagnosis Date     Asthma      Bipolar depression (H)      CAD (coronary artery disease) 2000     Coronary artery disease      Depressive disorder      GERD (gastroesophageal reflux disease)      Hypercholesteremia      Myocardial infarction (H)      PTSD (post-traumatic stress disorder)      Uncomplicated asthma      Patient Active Problem List   Diagnosis     Joint Pain In The Toes     Headache     Hypercholesterolemia     Bipolar Disorder     Nicotine Dependence     Coronary Artery Disease     Asthma, mild intermittent     Calderon's Esophagus     Vitamin D Deficiency     Hyperlipidemia     Lumbar Radiculopathy     Methamphetamine Abuse - In Remission     Sciatica     Opioid type dependence, episodic (H)     PTSD (post-traumatic stress disorder)     Lower back pain     Ankle pain, chronic     Nausea vomiting and diarrhea     Abdominal pain     Past Surgical  "History:   Procedure Laterality Date     APPENDECTOMY       ARTHROSCOPY ANKLE Left 4/16/2015    Procedure:  ANKLE ARTHROSCOPY DEBRIDEMENT SYNOVECTOMY;  Surgeon: Raleigh Bob DPM;  Location: [facility] OR;  Service:      ARTHROSCOPY SHOULDER ROTATOR CUFF REPAIR Right      BACK SURGERY  2000    Shaved cysst on spine     CARDIAC CATHETERIZATION       CHOLECYSTECTOMY       IR LUMBAR EPIDURAL STEROID INJECTION  4/20/2011     IR LUMBAR EPIDURAL STEROID INJECTION  5/5/2011     LUMBAR SPINE SURGERY       ORTHOPEDIC SURGERY      Bilateral rotator cuff.  Left shoulder tendon repair.  right foot twice.     OTHER SURGICAL HISTORY Right     ANKLESURGERY     Z APPENDECTOMY      Description: Appendectomy;  Recorded: 08/09/2011;     ZC LAP,CHOLECYSTECTOMY/EXPLORE      Description: Cholecystectomy Laparoscopic;  Recorded: 08/09/2011;  Comments: ABOUT 1995     Social Histrory  Smoking:  Alcohol Use:  Allergies   Allergen Reactions     Vicodin [Hydrocodone-Acetaminophen] Nausea         OUTPATIENT MEDICATIONS     New Prescriptions    No medications on file      Vitals:    03/20/22 1520   BP: (!) 134/94   Pulse: 112   Resp: 18   Temp: 98  F (36.7  C)   TempSrc: Oral   SpO2: 96%   Weight: 117.9 kg (260 lb)   Height: 1.88 m (6' 2\")       Physical Exam   Constitutional: Oriented to person, place, and time. Appears well-developed and well-nourished.   HEENT:    Head: Atraumatic.   Neck: Normal range of motion. Neck supple.   Cardiovascular: Normal rate, regular rhythm and normal heart sounds.    Pulmonary/Chest: Normal effort  and breath sounds normal.   Abdominal: Soft. Bowel sounds are normal.   Musculoskeletal: Normal range of motion.   Neurological: L4, L5, and S1 intact. Flexion at hip, toe walking, and heal stand motor intact without deficit.   Skin: Skin is warm and dry.   Psychiatric: Normal mood and affect. Behavior is normal. Thought content normal.       DIAGNOSTICS    LABORATORY FINDINGS (REVIEWED AND " INTERPRETED):  Labs Ordered and Resulted from Time of ED Arrival to Time of ED Departure - No data to display      IMAGING (REVIEWED AND INTERPRETED):  No orders to display       I, Jyothi Padilla, am serving as a scribe to document services personally performed by Julio Cesar Hernandez D.O., based on my observation and the provider s statements to me.    I, Julio Cesar Hernandez D.O., attest that Jyothi Padilla is acting in a scribe capacity, has observed my performance of the services and has documented them in accordance with my direction.    Julio Cesar Hernandez D.O.  EMERGENCY MEDICINE   03/20/22  [facility] EMERGENCY DEPARTMENT  [address] [place] [id number] [phone]  Dept: [phone]     Julio Cesar Hernandez DO  03/21/22 0026

## 2022-03-20 NOTE — Clinical Note
Kayden Cortez was seen and treated in our emergency department on 3/20/2022.  He may return to work on 03/20/2022.       If you have any questions or concerns, please don't hesitate to call.      Julio Cesar Hernandez, DO

## 2022-06-28 ENCOUNTER — OFFICE VISIT (OUTPATIENT)
Dept: FAMILY MEDICINE | Facility: CLINIC | Age: 52
End: 2022-06-28
Payer: COMMERCIAL

## 2022-06-28 VITALS
DIASTOLIC BLOOD PRESSURE: 68 MMHG | WEIGHT: 232 LBS | HEART RATE: 98 BPM | HEIGHT: 74 IN | OXYGEN SATURATION: 97 % | SYSTOLIC BLOOD PRESSURE: 103 MMHG | BODY MASS INDEX: 29.77 KG/M2

## 2022-06-28 DIAGNOSIS — S81.802A OPEN WOUND OF LEFT LOWER EXTREMITY, INITIAL ENCOUNTER: Primary | ICD-10-CM

## 2022-06-28 DIAGNOSIS — G89.29 CHRONIC ANKLE PAIN, UNSPECIFIED LATERALITY: ICD-10-CM

## 2022-06-28 DIAGNOSIS — K22.70 BARRETT'S ESOPHAGUS WITHOUT DYSPLASIA: ICD-10-CM

## 2022-06-28 DIAGNOSIS — M25.579 CHRONIC ANKLE PAIN, UNSPECIFIED LATERALITY: ICD-10-CM

## 2022-06-28 PROBLEM — F17.200 NICOTINE DEPENDENCE: Status: ACTIVE | Noted: 2021-08-08

## 2022-06-28 PROBLEM — F15.20 METHAMPHETAMINE DEPENDENCE (H): Status: ACTIVE | Noted: 2022-06-28

## 2022-06-28 PROBLEM — E66.9 OBESITY: Status: ACTIVE | Noted: 2022-06-28

## 2022-06-28 PROBLEM — F90.9 ATTENTION DEFICIT HYPERACTIVITY DISORDER (ADHD): Status: ACTIVE | Noted: 2022-06-28

## 2022-06-28 PROBLEM — I25.2 HISTORY OF MYOCARDIAL INFARCTION: Status: ACTIVE | Noted: 2022-06-28

## 2022-06-28 LAB
BASOPHILS # BLD AUTO: 0.1 10E3/UL (ref 0–0.2)
BASOPHILS NFR BLD AUTO: 1 %
EOSINOPHIL # BLD AUTO: 0.5 10E3/UL (ref 0–0.7)
EOSINOPHIL NFR BLD AUTO: 6 %
ERYTHROCYTE [DISTWIDTH] IN BLOOD BY AUTOMATED COUNT: 12.1 % (ref 10–15)
HCT VFR BLD AUTO: 45 % (ref 40–53)
HGB BLD-MCNC: 14.6 G/DL (ref 13.3–17.7)
IMM GRANULOCYTES # BLD: 0 10E3/UL
IMM GRANULOCYTES NFR BLD: 0 %
LYMPHOCYTES # BLD AUTO: 3.3 10E3/UL (ref 0.8–5.3)
LYMPHOCYTES NFR BLD AUTO: 39 %
MCH RBC QN AUTO: 30.7 PG (ref 26.5–33)
MCHC RBC AUTO-ENTMCNC: 32.4 G/DL (ref 31.5–36.5)
MCV RBC AUTO: 95 FL (ref 78–100)
MONOCYTES # BLD AUTO: 0.7 10E3/UL (ref 0–1.3)
MONOCYTES NFR BLD AUTO: 8 %
NEUTROPHILS # BLD AUTO: 3.9 10E3/UL (ref 1.6–8.3)
NEUTROPHILS NFR BLD AUTO: 46 %
PLATELET # BLD AUTO: 424 10E3/UL (ref 150–450)
RBC # BLD AUTO: 4.76 10E6/UL (ref 4.4–5.9)
WBC # BLD AUTO: 8.6 10E3/UL (ref 4–11)

## 2022-06-28 PROCEDURE — 85025 COMPLETE CBC W/AUTO DIFF WBC: CPT | Performed by: FAMILY MEDICINE

## 2022-06-28 PROCEDURE — 99214 OFFICE O/P EST MOD 30 MIN: CPT | Performed by: FAMILY MEDICINE

## 2022-06-28 PROCEDURE — 36415 COLL VENOUS BLD VENIPUNCTURE: CPT | Performed by: FAMILY MEDICINE

## 2022-06-28 RX ORDER — CEPHALEXIN 500 MG/1
500 CAPSULE ORAL 3 TIMES DAILY
Qty: 21 CAPSULE | Refills: 0 | Status: SHIPPED | OUTPATIENT
Start: 2022-06-28

## 2022-06-28 RX ORDER — SIMVASTATIN 40 MG
40 TABLET ORAL
COMMUNITY
Start: 2021-05-19

## 2022-06-28 RX ORDER — ATOMOXETINE 40 MG/1
CAPSULE ORAL
COMMUNITY
Start: 2021-09-17

## 2022-06-28 RX ORDER — BUPROPION HYDROCHLORIDE 150 MG/1
150 TABLET ORAL
COMMUNITY
Start: 2021-07-26

## 2022-06-28 RX ORDER — GABAPENTIN 600 MG/1
600 TABLET ORAL DAILY
Qty: 90 TABLET | Refills: 1 | Status: SHIPPED | OUTPATIENT
Start: 2022-06-28

## 2022-06-28 RX ORDER — NALTREXONE HYDROCHLORIDE 50 MG/1
50 TABLET, FILM COATED ORAL
COMMUNITY
Start: 2021-07-26

## 2022-06-28 RX ORDER — HYDROXYZINE HYDROCHLORIDE 50 MG/1
50 TABLET, FILM COATED ORAL
COMMUNITY
Start: 2021-05-19

## 2022-06-28 RX ORDER — ATORVASTATIN CALCIUM 40 MG/1
40 TABLET, FILM COATED ORAL
COMMUNITY
Start: 2021-06-14

## 2022-06-28 RX ORDER — GABAPENTIN 600 MG/1
600 TABLET ORAL
COMMUNITY
Start: 2021-08-09 | End: 2022-06-28

## 2022-06-28 RX ORDER — OMEPRAZOLE 40 MG/1
40 CAPSULE, DELAYED RELEASE ORAL AT BEDTIME
Qty: 90 CAPSULE | Refills: 1 | Status: SHIPPED | OUTPATIENT
Start: 2022-06-28

## 2022-06-28 RX ORDER — QUETIAPINE FUMARATE 100 MG/1
100 TABLET, FILM COATED ORAL
COMMUNITY
Start: 2021-07-26

## 2022-06-28 ASSESSMENT — ASTHMA QUESTIONNAIRES
QUESTION_2 LAST FOUR WEEKS HOW OFTEN HAVE YOU HAD SHORTNESS OF BREATH: THREE TO SIX TIMES A WEEK
QUESTION_4 LAST FOUR WEEKS HOW OFTEN HAVE YOU USED YOUR RESCUE INHALER OR NEBULIZER MEDICATION (SUCH AS ALBUTEROL): TWO OR THREE TIMES PER WEEK
ACT_TOTALSCORE: 17
ACT_TOTALSCORE: 17
QUESTION_5 LAST FOUR WEEKS HOW WOULD YOU RATE YOUR ASTHMA CONTROL: SOMEWHAT CONTROLLED
QUESTION_1 LAST FOUR WEEKS HOW MUCH OF THE TIME DID YOUR ASTHMA KEEP YOU FROM GETTING AS MUCH DONE AT WORK, SCHOOL OR AT HOME: A LITTLE OF THE TIME
QUESTION_3 LAST FOUR WEEKS HOW OFTEN DID YOUR ASTHMA SYMPTOMS (WHEEZING, COUGHING, SHORTNESS OF BREATH, CHEST TIGHTNESS OR PAIN) WAKE YOU UP AT NIGHT OR EARLIER THAN USUAL IN THE MORNING: ONCE OR TWICE

## 2022-06-28 ASSESSMENT — PATIENT HEALTH QUESTIONNAIRE - PHQ9
10. IF YOU CHECKED OFF ANY PROBLEMS, HOW DIFFICULT HAVE THESE PROBLEMS MADE IT FOR YOU TO DO YOUR WORK, TAKE CARE OF THINGS AT HOME, OR GET ALONG WITH OTHER PEOPLE: SOMEWHAT DIFFICULT
SUM OF ALL RESPONSES TO PHQ QUESTIONS 1-9: 12
SUM OF ALL RESPONSES TO PHQ QUESTIONS 1-9: 12

## 2022-06-28 NOTE — PROGRESS NOTES
"  Assessment & Plan     ICD-10-CM    1. Open wound of left lower extremity, initial encounter  S81.802A CBC with platelets and differential     CBC with platelets and differential   2. Calderon's esophagus without dysplasia  K22.70 omeprazole (PRILOSEC) 40 MG DR capsule     cephALEXin (KEFLEX) 500 MG capsule   3. Chronic ankle pain, unspecified laterality  M25.579 gabapentin (NEURONTIN) 600 MG tablet    G89.29      Medical decision making: Patient with an open wound of left lower extremity due to falling on a railing with at this that cut through his skin.  The wound shows early signs of infection.  We will start an antibiotic.  Wound is irrigated and cleaned by our assistant today.  Wound care discussed.  Patient would like omeprazole refilled for his Calderon's esophagus.  This is refilled for him.  I do note that he has not had a follow-up in clinic and I recommend that he follow-up with PCP for routine health maintenance.  For chronic ankle pain, he is on gabapentin that he uses 1 tablet at night and this is also refilled for him.       Tobacco Cessation:   reports that he has been smoking cigarettes. He has been smoking about 0.50 packs per day. He has never used smokeless tobacco.      BMI:   Estimated body mass index is 29.79 kg/m  as calculated from the following:    Height as of this encounter: 1.88 m (6' 2\").    Weight as of this encounter: 105.2 kg (232 lb).       Depression Screening Follow Up    PHQ 6/28/2022   PHQ-9 Total Score 12   Q9: Thoughts of better off dead/self-harm past 2 weeks Not at all         Follow Up Actions Taken  Referred patient back to PCP     MEDICATIONS:   Orders Placed This Encounter   Medications     atomoxetine (STRATTERA) 40 MG capsule     atorvastatin (LIPITOR) 40 MG tablet     Sig: Take 40 mg by mouth     buPROPion (WELLBUTRIN XL) 150 MG 24 hr tablet     Sig: Take 150 mg by mouth     cholecalciferol 50 MCG (2000 UT) CAPS     Sig: Take 2,000 Units by mouth     DISCONTD: " gabapentin (NEURONTIN) 600 MG tablet     Sig: Take 600 mg by mouth     hydrOXYzine (ATARAX) 50 MG tablet     Sig: Take 50 mg by mouth     mometasone-formoterol (DULERA) 100-5 MCG/ACT inhaler     Sig: Inhale 2 puffs into the lungs 2 times daily     naltrexone (DEPADE/REVIA) 50 MG tablet     Sig: Take 50 mg by mouth     QUEtiapine (SEROQUEL) 100 MG tablet     Sig: Take 100 mg by mouth     simvastatin (ZOCOR) 40 MG tablet     Sig: Take 40 mg by mouth     omeprazole (PRILOSEC) 40 MG DR capsule     Sig: Take 1 capsule (40 mg) by mouth At Bedtime     Dispense:  90 capsule     Refill:  1     gabapentin (NEURONTIN) 600 MG tablet     Sig: Take 1 tablet (600 mg) by mouth daily     Dispense:  90 tablet     Refill:  1     cephALEXin (KEFLEX) 500 MG capsule     Sig: Take 1 capsule (500 mg) by mouth 3 times daily     Dispense:  21 capsule     Refill:  0          - Continue other medications without change    Return in about 2 weeks (around 7/12/2022) for Follow up.    Shiva Shah MD  [facility]    Lizzy Monique is a 51 year old accompanied by his N/A., presenting for the following health issues:  Trauma (X2 days, left lower leg. Pt fell down the stairs, has been bleeding, pt put a band aid on it. Painful. Pt has been trying to elevate it as much as possible )      Trauma    History of Present Illness       Reason for visit:  Left Leg, back ,neck and  under right arm  Symptom onset:  1-3 days ago  Symptoms include:  Puncture, wound on r,leg an extreme amount of pain and neck movement limited and real sore all over  Symptom intensity:  Severe  Symptom progression:  Worsening  Had these symptoms before:  No  What makes it worse:  Moving walking turning head  What makes it better:  No not really    He eats 0-1 servings of fruits and vegetables daily.He consumes 2 sweetened beverage(s) daily.He exercises with enough effort to increase his heart rate 20 to 29 minutes per day.  He exercises with  "enough effort to increase his heart rate 5 days per week. He is missing 2 dose(s) of medications per week.  He is not taking prescribed medications regularly due to remembering to take.    Today's PHQ-9         PHQ-9 Total Score: 12    PHQ-9 Q9 Thoughts of better off dead/self-harm past 2 weeks :   Not at all    How difficult have these problems made it for you to do your work, take care of things at home, or get along with other people: Somewhat difficult           Review of Systems   Constitutional, HEENT, cardiovascular, pulmonary, gi and gu systems are negative, except as otherwise noted.      Objective    /68 (BP Location: Left arm, Patient Position: Sitting, Cuff Size: Adult Large)   Pulse 98   Ht 1.88 m (6' 2\")   Wt 105.2 kg (232 lb)   SpO2 97%   BMI 29.79 kg/m    Body mass index is 29.79 kg/m .  Physical Exam   GENERAL: healthy, alert and no distress  MS: LLE exam shows normal strength and muscle mass, no deformities and noted some chronic induration changes on the anterior shin.  Noted an open gash/wound that is about 2 cm by half centimeter.  This has some granulomatous tissue at its base.  Mild erythema on the surrounding and  tender to touch.  No streaking is noted.    Results for orders placed or performed in visit on 06/28/22   CBC with platelets and differential     Status: None   Result Value Ref Range    WBC Count 8.6 4.0 - 11.0 10e3/uL    RBC Count 4.76 4.40 - 5.90 10e6/uL    Hemoglobin 14.6 13.3 - 17.7 g/dL    Hematocrit 45.0 40.0 - 53.0 %    MCV 95 78 - 100 fL    MCH 30.7 26.5 - 33.0 pg    MCHC 32.4 31.5 - 36.5 g/dL    RDW 12.1 10.0 - 15.0 %    Platelet Count 424 150 - 450 10e3/uL    % Neutrophils 46 %    % Lymphocytes 39 %    % Monocytes 8 %    % Eosinophils 6 %    % Basophils 1 %    % Immature Granulocytes 0 %    Absolute Neutrophils 3.9 1.6 - 8.3 10e3/uL    Absolute Lymphocytes 3.3 0.8 - 5.3 10e3/uL    Absolute Monocytes 0.7 0.0 - 1.3 10e3/uL    Absolute Eosinophils 0.5 0.0 - 0.7 " 10e3/uL    Absolute Basophils 0.1 0.0 - 0.2 10e3/uL    Absolute Immature Granulocytes 0.0 <=0.4 10e3/uL   CBC with platelets and differential     Status: None    Narrative    The following orders were created for panel order CBC with platelets and differential.  Procedure                               Abnormality         Status                     ---------                               -----------         ------                     CBC with platelets and d...[[id number]]                      Final result                 Please view results for these tests on the individual orders.                   .  ..

## 2022-06-29 ENCOUNTER — TELEPHONE (OUTPATIENT)
Dept: FAMILY MEDICINE | Facility: CLINIC | Age: 52
End: 2022-06-29

## 2022-06-29 NOTE — TELEPHONE ENCOUNTER
----- Message from Shiva Shah MD sent at 6/28/2022  3:11 PM CDT -----  Please inform patient that labs for blood counts and hemoglobin are normal.    Shiva Shah MD

## 2022-08-01 ENCOUNTER — TELEPHONE (OUTPATIENT)
Dept: FAMILY MEDICINE | Facility: CLINIC | Age: 52
End: 2022-08-01

## 2022-08-01 DIAGNOSIS — K22.70 BARRETT'S ESOPHAGUS WITHOUT DYSPLASIA: ICD-10-CM

## 2022-08-01 DIAGNOSIS — M25.579 CHRONIC ANKLE PAIN, UNSPECIFIED LATERALITY: ICD-10-CM

## 2022-08-01 DIAGNOSIS — G89.29 CHRONIC ANKLE PAIN, UNSPECIFIED LATERALITY: ICD-10-CM

## 2022-08-01 RX ORDER — GABAPENTIN 600 MG/1
600 TABLET ORAL DAILY
Qty: 90 TABLET | Refills: 1 | Status: CANCELLED | OUTPATIENT
Start: 2022-08-01

## 2022-08-01 NOTE — TELEPHONE ENCOUNTER
Patients mother calling back.  She said she can  bottle of prescription as long as the patients name and doctors name is on it.   Please send to [facility] pharmacy

## 2022-08-01 NOTE — TELEPHONE ENCOUNTER
Patients mother calling for patients anxiety medication. She says he is in [facility]. She doesn't know how to get them to the patient.  The patient told his mother that the doctor has to call correction.  I told patients mother to call correction and find out what the protocol is.  She will call us back when she finds out.

## 2022-08-02 NOTE — TELEPHONE ENCOUNTER
Please call the [facility] long-term (off of [address] and ask for the physician there to give me a call if possible)

## 2022-08-02 NOTE — TELEPHONE ENCOUNTER
Called and spoke to the [facility]. They don't have anyone by that name at their facility or in their system.

## 2022-08-02 NOTE — TELEPHONE ENCOUNTER
I haven't seen the pt since Nov 2020.  In addition he has a hisotry of bipolar disorder.  I would prefer having a way to talk with the long term doctor;  I could call him/her or they could call me.

## 2022-08-02 NOTE — TELEPHONE ENCOUNTER
Please call [facility] and find out if a med can be prescribed for the patient for anxiety.  If so, how do I go about doing that?  Does the retirement doctor take over the prescribing of medication?

## 2022-08-02 NOTE — TELEPHONE ENCOUNTER
Physicians are only at the [facility] Tuesdays and Thursdays. They are in from around 9 am to 10 am.     The nurse I spoke to said that patient hasn't been evaluated yet as he just got to the [facility]. Patient will be evaluated by the mental health department and they will decide what patient needs. They have a certain amount of medications that they will prescribe patient.   They said that they can take a letter with patient currently med list as recommendations on what to prescribe patient. Otherwise the mental health department will prescribe something that they feel will be suitable for patient.     If you have any question you can contact the [facility] at [phone] and get transferred.    If you would like to fax a med list with letter please fax it to [phone].

## 2022-10-15 NOTE — ED TRIAGE NOTES
Pt reports that he rolled his left ankle 2 weeks ago when he stepped into a hole in his yard. Reports worsening pain at this time. He was not seen for this. Reports worsening swelling and pain.   
Wheezing
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
## 2023-10-25 ENCOUNTER — HOSPITAL ENCOUNTER (EMERGENCY)
Facility: HOSPITAL | Age: 53
Discharge: HOME | End: 2023-10-25
Attending: EMERGENCY MEDICINE | Admitting: EMERGENCY MEDICINE
Payer: COMMERCIAL

## 2023-10-25 ENCOUNTER — APPOINTMENT (OUTPATIENT)
Dept: RADIOLOGY | Facility: HOSPITAL | Age: 53
End: 2023-10-25
Attending: EMERGENCY MEDICINE
Payer: COMMERCIAL

## 2023-10-25 VITALS
OXYGEN SATURATION: 97 % | RESPIRATION RATE: 16 BRPM | TEMPERATURE: 98.5 F | HEART RATE: 87 BPM | DIASTOLIC BLOOD PRESSURE: 70 MMHG | SYSTOLIC BLOOD PRESSURE: 114 MMHG

## 2023-10-25 DIAGNOSIS — U07.1 COVID-19: ICD-10-CM

## 2023-10-25 LAB
ANION GAP SERPL CALCULATED.3IONS-SCNC: 9 MMOL/L (ref 7–15)
BASOPHILS # BLD AUTO: 0 10E3/UL (ref 0–0.2)
BASOPHILS NFR BLD AUTO: 0 %
BUN SERPL-MCNC: 6.8 MG/DL (ref 6–20)
CALCIUM SERPL-MCNC: 9.1 MG/DL (ref 8.6–10)
CHLORIDE SERPL-SCNC: 100 MMOL/L (ref 98–107)
CREAT SERPL-MCNC: 0.98 MG/DL (ref 0.67–1.17)
DEPRECATED HCO3 PLAS-SCNC: 26 MMOL/L (ref 22–29)
EGFRCR SERPLBLD CKD-EPI 2021: >90 ML/MIN/1.73M2
EOSINOPHIL # BLD AUTO: 0 10E3/UL (ref 0–0.7)
EOSINOPHIL NFR BLD AUTO: 0 %
ERYTHROCYTE [DISTWIDTH] IN BLOOD BY AUTOMATED COUNT: 12.5 % (ref 10–15)
FLUAV RNA SPEC QL NAA+PROBE: NEGATIVE
FLUBV RNA RESP QL NAA+PROBE: NEGATIVE
GLUCOSE SERPL-MCNC: 99 MG/DL (ref 70–99)
HCT VFR BLD AUTO: 43.8 % (ref 40–53)
HGB BLD-MCNC: 14.9 G/DL (ref 13.3–17.7)
HOLD SPECIMEN: NORMAL
IMM GRANULOCYTES # BLD: 0 10E3/UL
IMM GRANULOCYTES NFR BLD: 0 %
LYMPHOCYTES # BLD AUTO: 1.5 10E3/UL (ref 0.8–5.3)
LYMPHOCYTES NFR BLD AUTO: 23 %
MCH RBC QN AUTO: 31.2 PG (ref 26.5–33)
MCHC RBC AUTO-ENTMCNC: 34 G/DL (ref 31.5–36.5)
MCV RBC AUTO: 92 FL (ref 78–100)
MONOCYTES # BLD AUTO: 0.9 10E3/UL (ref 0–1.3)
MONOCYTES NFR BLD AUTO: 13 %
NEUTROPHILS # BLD AUTO: 4.2 10E3/UL (ref 1.6–8.3)
NEUTROPHILS NFR BLD AUTO: 64 %
NRBC # BLD AUTO: 0 10E3/UL
NRBC BLD AUTO-RTO: 0 /100
PLATELET # BLD AUTO: 317 10E3/UL (ref 150–450)
POTASSIUM SERPL-SCNC: 3.8 MMOL/L (ref 3.4–5.3)
RBC # BLD AUTO: 4.78 10E6/UL (ref 4.4–5.9)
RSV RNA SPEC NAA+PROBE: NEGATIVE
SARS-COV-2 RNA RESP QL NAA+PROBE: POSITIVE
SODIUM SERPL-SCNC: 135 MMOL/L (ref 135–145)
TROPONIN T SERPL HS-MCNC: 15 NG/L
WBC # BLD AUTO: 6.7 10E3/UL (ref 4–11)

## 2023-10-25 PROCEDURE — 93005 ELECTROCARDIOGRAM TRACING: CPT | Performed by: STUDENT IN AN ORGANIZED HEALTH CARE EDUCATION/TRAINING PROGRAM

## 2023-10-25 PROCEDURE — 85025 COMPLETE CBC W/AUTO DIFF WBC: CPT | Performed by: EMERGENCY MEDICINE

## 2023-10-25 PROCEDURE — 85025 COMPLETE CBC W/AUTO DIFF WBC: CPT | Performed by: STUDENT IN AN ORGANIZED HEALTH CARE EDUCATION/TRAINING PROGRAM

## 2023-10-25 PROCEDURE — 80048 BASIC METABOLIC PNL TOTAL CA: CPT | Performed by: EMERGENCY MEDICINE

## 2023-10-25 PROCEDURE — 87637 SARSCOV2&INF A&B&RSV AMP PRB: CPT | Performed by: EMERGENCY MEDICINE

## 2023-10-25 PROCEDURE — 36415 COLL VENOUS BLD VENIPUNCTURE: CPT | Performed by: STUDENT IN AN ORGANIZED HEALTH CARE EDUCATION/TRAINING PROGRAM

## 2023-10-25 PROCEDURE — 71045 X-RAY EXAM CHEST 1 VIEW: CPT

## 2023-10-25 PROCEDURE — 84484 ASSAY OF TROPONIN QUANT: CPT | Performed by: EMERGENCY MEDICINE

## 2023-10-25 PROCEDURE — 99285 EMERGENCY DEPT VISIT HI MDM: CPT | Mod: 25

## 2023-10-25 PROCEDURE — 93005 ELECTROCARDIOGRAM TRACING: CPT | Performed by: EMERGENCY MEDICINE

## 2023-10-25 ASSESSMENT — ACTIVITIES OF DAILY LIVING (ADL): ADLS_ACUITY_SCORE: 37

## 2023-10-25 NOTE — ED PROVIDER NOTES
Emergency Department Encounter     Evaluation Date & Time:   10/25/2023  5:11 PM    CHIEF COMPLAINT:  Chest Pain and Fever      Triage Note:The patient reports x2 days of lethargy and diaphoresis. He reports sob with exertion. He reports a dry cough. He repots 24 hours of chest pain located in his sternum and radiating into his arms. He has a history of multiple miocardia infarctions.                    ED COURSE & MEDICAL DECISION MAKING:     ED Course as of 10/25/23 1951   Wed Oct 25, 2023   1831 Covid +, consistent with recent history. Will update, discuss with pt.  Doubtful he will take Paxlovid.     1838 CXR (independent interpretation): no acute cardiopulmonary process    1935 Pt updated on results, including + covid. Discussed paxlovid as he would likely be appropriate candidate given his CAD history.  Pt agreeable and Rx given at discharge. Encouraged follow up, understands return precautions. Pt appropriate for discharge, outpatient follow up.   1949 Pt has drug/drug interactions that make paxlovid inappropriate to prescribe.  Pt updated. Instructed on cares, outpt follow up and return precautions.         Symptoms starting Monday with some dyspnea, chills/sweats.  Admits to cough as well. No known fevers, but no check with thermometer at home. Has some nausea and achiness to body.  Pt does have some lower chest discomfort for the past 1-2 days as well, but constant.  Denies HA, sore throat, abd pain, leg pain/swelling and nothing to suggest DVT with no obvious PE/DVT risk factors. Will not pursue PE as a result.  Hx of MI and off meds for a few years, states he's going to see primary clinic to get back on medication regimen as he recently restarted his MH meds.      Medical Decision Making    History:  Supplemental history from: Documented in chart, if applicable  External Record(s) reviewed: Documented in chart, if applicable.    Work Up:  Chart documentation includes differential considered and any  EKGs or imaging independently interpreted by provider, where specified.  In additional to work up documented, I considered the following work up: Documented in chart, if applicable.    External consultation:  Discussion of management with another provider: Documented in chart, if applicable    Complicating factors:  Care impacted by chronic illness: Heart Disease and Mental Health  Care affected by social determinants of health: N/A    Disposition considerations: Discharge. No recommendations on prescription strength medication(s). I considered admission, but ultimately discharged patient after reassuring workup/evaluation.      At the conclusion of the encounter I discussed the results of all the tests and the disposition. The questions were answered. The patient or family acknowledged understanding and was agreeable with the care plan.      MEDICATIONS GIVEN IN THE EMERGENCY DEPARTMENT:  Medications - No data to display    NEW PRESCRIPTIONS STARTED AT TODAY'S ED VISIT:  New Prescriptions    No medications on file       HPI   HPI     Kayden Cortez is a 53 year old male with a pertinent history of CAD s/p MI remotely and bipolar who presents to this ED via walk-in for evaluation of multiple symptoms for the past 2-3 days, including chills/diaphoresis, dyspnea and chest tightness.  Pt reports he felt sick with cough and getting diaphoretic Monday, having dyspnea, cough since then with some chest pain the past day or more. Denies known fevers, leg pain/swelling, hx of dvt/pe or recent travel/surgery.  Pt does not take meds for symptoms and refuses anything here due to previous substance abuse.  Pt refusing even ibuprofen/tylenol. He was also previously on medications s/p MI, but stopped a few years ago.  Pt acknowledges he needs to see his doctor to get back on them.    REVIEW OF SYSTEMS:  See HPI      Medical History     Past Medical History:   Diagnosis Date    Asthma     Bipolar depression (H)     CAD (coronary  artery disease) 2000    Coronary artery disease     Depressive disorder     GERD (gastroesophageal reflux disease)     Hypercholesteremia     Myocardial infarction (H)     PTSD (post-traumatic stress disorder)     Uncomplicated asthma        Past Surgical History:   Procedure Laterality Date    APPENDECTOMY      ARTHROSCOPY ANKLE Left 4/16/2015    Procedure:  ANKLE ARTHROSCOPY DEBRIDEMENT SYNOVECTOMY;  Surgeon: Raleigh Bob DPM;  Location: [facility] OR;  Service:     ARTHROSCOPY SHOULDER ROTATOR CUFF REPAIR Right     BACK SURGERY  2000    Shaved cysst on spine    CARDIAC CATHETERIZATION      CHOLECYSTECTOMY      IR LUMBAR EPIDURAL STEROID INJECTION  4/20/2011    IR LUMBAR EPIDURAL STEROID INJECTION  5/5/2011    LUMBAR SPINE SURGERY      ORTHOPEDIC SURGERY      Bilateral rotator cuff.  Left shoulder tendon repair.  right foot twice.    OTHER SURGICAL HISTORY Right     ANKLESURGERY    Z APPENDECTOMY      Description: Appendectomy;  Recorded: 08/09/2011;    [facility] LAP,CHOLECYSTECTOMY/EXPLORE      Description: Cholecystectomy Laparoscopic;  Recorded: 08/09/2011;  Comments: ABOUT 1995       Family History   Problem Relation Age of Onset    Diabetes Mother     Diabetes Father     Cancer Father         CANCER       Social History     Tobacco Use    Smoking status: Every Day     Packs/day: .5     Types: Cigarettes    Smokeless tobacco: Never    Tobacco comments:     also using e-cigarette   Substance Use Topics    Alcohol use: Not Currently     Comment: 10/6/21: sober for ~20 years    Drug use: Not Currently     Types: Methamphetamines     Comment: 10/6/21: 5 months clean       ALBUTEROL IN  aspirin (ASA) 325 MG tablet  atenolol (TENORMIN) 25 MG tablet  atomoxetine (STRATTERA) 40 MG capsule  atorvastatin (LIPITOR) 40 MG tablet  budesonide-formoterol (SYMBICORT) 160-4.5 MCG/ACT Inhaler  buPROPion (WELLBUTRIN XL) 150 MG 24 hr tablet  cephALEXin (KEFLEX) 500 MG capsule  Cholecalciferol (VITAMIN D3) 50 MCG (2000 UT)  CAPS  cholecalciferol 50 MCG (2000 UT) CAPS  diazepam (VALIUM) 5 MG tablet  gabapentin (NEURONTIN) 600 MG tablet  gabapentin 300 MG PO capsule  hydrOXYzine (ATARAX) 50 MG tablet  lurasidone (LATUDA) 80 MG TABS tablet  mometasone-formoterol (DULERA) 100-5 MCG/ACT inhaler  naltrexone (DEPADE/REVIA) 50 MG tablet  naproxen (NAPROSYN) 500 MG tablet  nitroGLYcerin (NITROSTAT) 0.4 MG sublingual tablet  omeprazole (PRILOSEC) 40 MG DR capsule  QUEtiapine (SEROQUEL) 100 MG tablet  simvastatin (ZOCOR) 40 MG tablet  traZODone (DESYREL) 50 MG tablet        Physical Exam     Vitals:  /77   Pulse 73   Temp 98.5  F (36.9  C) (Oral)   Resp 16   SpO2 95%     PHYSICAL EXAM:   Physical Exam  Vitals and nursing note reviewed.   Constitutional:       General: He is not in acute distress.     Appearance: Normal appearance.   HENT:      Head: Normocephalic and atraumatic.      Nose: Congestion present.      Mouth/Throat:      Mouth: Mucous membranes are moist.   Eyes:      Pupils: Pupils are equal, round, and reactive to light.   Neck:      Vascular: No JVD.   Cardiovascular:      Rate and Rhythm: Normal rate and regular rhythm.      Pulses: Normal pulses.           Radial pulses are 2+ on the right side and 2+ on the left side.        Dorsalis pedis pulses are 2+ on the right side and 2+ on the left side.   Pulmonary:      Effort: Pulmonary effort is normal. No respiratory distress.      Breath sounds: Normal breath sounds.   Abdominal:      Palpations: Abdomen is soft.      Tenderness: There is no abdominal tenderness.   Musculoskeletal:      Cervical back: Full passive range of motion without pain and neck supple.      Comments: No calf tenderness or swelling b/l   Skin:     General: Skin is warm.      Findings: No rash.   Neurological:      General: No focal deficit present.      Mental Status: He is alert. Mental status is at baseline.      Comments: Fluent speech, no acute lateralizing deficits   Psychiatric:         Mood  and Affect: Mood normal.         Behavior: Behavior normal.         Results     LAB:  All pertinent labs reviewed and interpreted  Labs Ordered and Resulted from Time of ED Arrival to Time of ED Departure   INFLUENZA A/B, RSV, & SARS-COV2 PCR - Abnormal       Result Value    Influenza A PCR Negative      Influenza B PCR Negative      RSV PCR Negative      SARS CoV2 PCR Positive (*)    BASIC METABOLIC PANEL - Normal    Sodium 135      Potassium 3.8      Chloride 100      Carbon Dioxide (CO2) 26      Anion Gap 9      Urea Nitrogen 6.8      Creatinine 0.98      GFR Estimate >90      Calcium 9.1      Glucose 99     TROPONIN T, HIGH SENSITIVITY - Normal    Troponin T, High Sensitivity 15     CBC WITH PLATELETS AND DIFFERENTIAL    WBC Count 6.7      RBC Count 4.78      Hemoglobin 14.9      Hematocrit 43.8      MCV 92      MCH 31.2      MCHC 34.0      RDW 12.5      Platelet Count 317      % Neutrophils 64      % Lymphocytes 23      % Monocytes 13      % Eosinophils 0      % Basophils 0      % Immature Granulocytes 0      NRBCs per 100 WBC 0      Absolute Neutrophils 4.2      Absolute Lymphocytes 1.5      Absolute Monocytes 0.9      Absolute Eosinophils 0.0      Absolute Basophils 0.0      Absolute Immature Granulocytes 0.0      Absolute NRBCs 0.0         RADIOLOGY:  XR Chest Port 1 View   Final Result   IMPRESSION: Negative chest.                   ECG:  NSR, rate 83, normal intervals, no acute ischemia    I have independently reviewed and interpreted the EKG(s) documented above     PROCEDURES:  Procedures:  none      FINAL IMPRESSION:    ICD-10-CM    1. COVID-19  U07.1           0 minutes of critical care time        Rolf Martin DO  Emergency Medicine  [facility] EMERGENCY DEPARTMENT  10/25/2023  5:13 PM          Rolf Martin MD  10/25/23 1951

## 2023-10-25 NOTE — ED TRIAGE NOTES
The patient reports x2 days of lethargy and diaphoresis. He reports sob with exertion. He reports a dry cough. He repots 24 hours of chest pain located in his sternum and radiating into his arms. He has a history of multiple miocardia infarctions.
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
## 2023-10-26 NOTE — DISCHARGE INSTRUCTIONS
You have medication interaction/contraindications with paxlovid, so we cannot prescribe this.  Hydrate well, take tylenol/ibuprofen for aches/pain/fevers. Follow up with primary clinic.

## 2023-10-28 LAB
ATRIAL RATE - MUSE: 83 BPM
DIASTOLIC BLOOD PRESSURE - MUSE: NORMAL MMHG
INTERPRETATION ECG - MUSE: NORMAL
P AXIS - MUSE: 62 DEGREES
PR INTERVAL - MUSE: 144 MS
QRS DURATION - MUSE: 92 MS
QT - MUSE: 354 MS
QTC - MUSE: 415 MS
R AXIS - MUSE: 49 DEGREES
SYSTOLIC BLOOD PRESSURE - MUSE: NORMAL MMHG
T AXIS - MUSE: 33 DEGREES
VENTRICULAR RATE- MUSE: 83 BPM

## 2024-04-03 ENCOUNTER — TRANSFERRED RECORDS (OUTPATIENT)
Dept: HEALTH INFORMATION MANAGEMENT | Facility: CLINIC | Age: 54
End: 2024-04-03
Payer: COMMERCIAL

## 2025-04-19 ENCOUNTER — TELEPHONE (OUTPATIENT)
Dept: FAMILY MEDICINE | Facility: CLINIC | Age: 55
End: 2025-04-19

## 2025-04-19 NOTE — TELEPHONE ENCOUNTER
[facility] and confirmed.     Writer also called patient, no answer. Left a detailed voicemail stating rx will be filled within couple hours and for patient to call pharmacy prior to . Writer provided call back number for further questions or concerns.    Ryan Jaffe MA on 04/19/2025 at 12:21 PM

## 2025-04-19 NOTE — TELEPHONE ENCOUNTER
Medication Question or Refill    Contacts       Contact Date/Time Type Contact Phone/Fax    04/19/2025 09:30 AM CDT Phone (Incoming) Kayden Cortez (Self) [phone] (M)            What medication are you calling about (include dose and sig)?: amoxicillin-clavulanate (AUGMENTIN) 875-125 MG tablet ,   cyclobenzaprine (FLEXERIL) 10 MG tablet  and lidocaine, viscous, (XYLOCAINE) 2 % solution     [facility]   [facility]  [address] [facility] [facility]   [place] [address]      Controlled Substance Agreement on file:   CSA -- Patient Level:    CSA: None found at the patient level.       Who prescribed the medication?: Dr. Abreu    Do you need a refill? Yes    When did you use the medication last? N/A    Patient offered an appointment? No    Do you have any questions or concerns?  Yes: Other pharmacy was closed please send to pharmacy listed above       Okay to leave a detailed message?: Yes at Cell number on file:    Telephone Information:   Mobile [phone]

## 2025-06-04 ENCOUNTER — OFFICE VISIT (OUTPATIENT)
Dept: URGENT CARE | Facility: URGENT CARE | Age: 55
End: 2025-06-04
Payer: COMMERCIAL

## 2025-06-04 VITALS
OXYGEN SATURATION: 98 % | WEIGHT: 250 LBS | SYSTOLIC BLOOD PRESSURE: 146 MMHG | TEMPERATURE: 98.2 F | RESPIRATION RATE: 16 BRPM | DIASTOLIC BLOOD PRESSURE: 74 MMHG | HEART RATE: 92 BPM | BODY MASS INDEX: 32.1 KG/M2

## 2025-06-04 DIAGNOSIS — H66.90 ACUTE OTITIS MEDIA, UNSPECIFIED OTITIS MEDIA TYPE: Primary | ICD-10-CM

## 2025-06-04 PROCEDURE — 99213 OFFICE O/P EST LOW 20 MIN: CPT | Performed by: FAMILY MEDICINE

## 2025-06-04 RX ORDER — AMOXICILLIN 875 MG/1
875 TABLET, COATED ORAL 2 TIMES DAILY
Qty: 20 TABLET | Refills: 0 | Status: SHIPPED | OUTPATIENT
Start: 2025-06-04 | End: 2025-06-14

## 2025-06-04 NOTE — PROGRESS NOTES
Assessment & Plan     Acute otitis media, unspecified otitis media type  amox  - amoxicillin (AMOXIL) 875 MG tablet  Dispense: 20 tablet; Refill: 0             No follow-ups on file.    Willy Friend MD  [facility] Monique is a 54 year old male who presents to clinic today for the following health issues:  Chief Complaint   Patient presents with    Urgent Care     - Right Sided Head Pain  - Runny Nose  -  Cough  - 2 Days  - Tylenol for symptoms          6/4/2025     6:30 PM   Additional Questions   Roomed by Hardy BECKER   Accompanied by Partner     HPI    Muffled on right ear  2 days  Ringing in ear  Tylenol  No rash.        Review of Systems        Objective    BP (!) 146/74   Pulse 92   Temp 98.2  F (36.8  C) (Oral)   Resp 16   Wt 113.4 kg (250 lb)   SpO2 98%   BMI 32.10 kg/m    Physical Exam  Vitals and nursing note reviewed.   Constitutional:       Appearance: Normal appearance.   HENT:      Left Ear: Tympanic membrane and ear canal normal.      Ears:      Comments: Red inflammed right TM     Mouth/Throat:      Mouth: Mucous membranes are moist.   Eyes:      Extraocular Movements: Extraocular movements intact.      Pupils: Pupils are equal, round, and reactive to light.   Cardiovascular:      Rate and Rhythm: Normal rate and regular rhythm.      Pulses: Normal pulses.      Heart sounds: Normal heart sounds.   Pulmonary:      Effort: Pulmonary effort is normal.      Breath sounds: Normal breath sounds.   Neurological:      Mental Status: He is alert.

## 2025-06-04 NOTE — PROGRESS NOTES
Urgent Care Clinic Visit    Chief Complaint   Patient presents with    Urgent Care     - Right Sided Head Pain  - Runny Nose  -  Cough  - 2 Days  - Tylenol for symptoms               6/4/2025     6:30 PM   Additional Questions   Roomed by Hardy BECKER   Accompanied by Partner